# Patient Record
Sex: FEMALE | Race: BLACK OR AFRICAN AMERICAN | Employment: FULL TIME | URBAN - METROPOLITAN AREA
[De-identification: names, ages, dates, MRNs, and addresses within clinical notes are randomized per-mention and may not be internally consistent; named-entity substitution may affect disease eponyms.]

---

## 2017-03-15 DIAGNOSIS — I10 ESSENTIAL HYPERTENSION WITH GOAL BLOOD PRESSURE LESS THAN 130/80: ICD-10-CM

## 2017-03-15 RX ORDER — HYDROCHLOROTHIAZIDE 25 MG/1
TABLET ORAL
Qty: 30 TAB | Refills: 1 | Status: SHIPPED | OUTPATIENT
Start: 2017-03-15 | End: 2017-06-01 | Stop reason: SDUPTHER

## 2017-06-01 ENCOUNTER — OFFICE VISIT (OUTPATIENT)
Dept: FAMILY MEDICINE CLINIC | Age: 52
End: 2017-06-01

## 2017-06-01 VITALS
OXYGEN SATURATION: 95 % | WEIGHT: 194 LBS | RESPIRATION RATE: 20 BRPM | HEART RATE: 101 BPM | BODY MASS INDEX: 34.38 KG/M2 | DIASTOLIC BLOOD PRESSURE: 82 MMHG | TEMPERATURE: 98.2 F | HEIGHT: 63 IN | SYSTOLIC BLOOD PRESSURE: 117 MMHG

## 2017-06-01 DIAGNOSIS — N90.7 EPIDERMOID CYST OF VULVA: Primary | ICD-10-CM

## 2017-06-01 DIAGNOSIS — I10 ESSENTIAL HYPERTENSION WITH GOAL BLOOD PRESSURE LESS THAN 130/80: ICD-10-CM

## 2017-06-01 RX ORDER — HYDROCHLOROTHIAZIDE 25 MG/1
25 TABLET ORAL DAILY
Qty: 30 TAB | Refills: 5 | Status: SHIPPED | OUTPATIENT
Start: 2017-06-01 | End: 2018-07-05 | Stop reason: SDUPTHER

## 2017-06-01 RX ORDER — CLINDAMYCIN HYDROCHLORIDE 300 MG/1
300 CAPSULE ORAL 3 TIMES DAILY
Qty: 30 CAP | Refills: 0 | Status: SHIPPED | OUTPATIENT
Start: 2017-06-01 | End: 2017-06-11

## 2017-06-01 RX ORDER — CEPHALEXIN 500 MG/1
500 CAPSULE ORAL 4 TIMES DAILY
Qty: 40 CAP | Refills: 0 | Status: SHIPPED | OUTPATIENT
Start: 2017-06-01 | End: 2017-06-11

## 2017-06-01 NOTE — MR AVS SNAPSHOT
Visit Information Date & Time Provider Department Dept. Phone Encounter #  
 6/1/2017 11:45 AM Curry Pickett NP [facility] [id number] Follow-up Instructions Return if symptoms worsen or fail to improve. Upcoming Health Maintenance Date Due FOBT Q 1 YEAR AGE 50-75 11/26/2016 INFLUENZA AGE 9 TO ADULT 8/1/2017 BREAST CANCER SCRN MAMMOGRAM 11/18/2018 DTaP/Tdap/Td series (2 - Td) 11/17/2026 Allergies as of 6/1/2017  Review Complete On: 6/1/2017 By: Curry Pickett NP Severity Noted Reaction Type Reactions Pcn [Penicillins]  06/19/2015    Rash Pcn [Penicillins]  05/26/2016    Rash, Swelling Percocet [Oxycodone-acetaminophen]  06/09/2016    Other (comments)  
 hypotension Sulfa (Sulfonamide Antibiotics)  06/19/2015    Hives Sulfa (Sulfonamide Antibiotics)  05/26/2016    Rash, Swelling Current Immunizations  Reviewed on 11/17/2016 Name Date Influenza Vaccine (Quad) PF 11/17/2016 Tdap 11/17/2016 Not reviewed this visit You Were Diagnosed With   
  
 Codes Comments Labial cyst    -  Primary ICD-10-CM: N90.7 ICD-9-CM: 624.8 Essential hypertension with goal blood pressure less than 130/80     ICD-10-CM: I10 
ICD-9-CM: 401.9 Vitals BP Pulse Temp Resp Height(growth percentile) Weight(growth percentile) 117/82 (BP 1 Location: Right arm, BP Patient Position: Sitting) (!) 101 98.2 °F (36.8 °C) (Oral) 20 5' 2.5\" (1.588 m) 194 lb (88 kg) SpO2 BMI OB Status Smoking Status 95% 34.92 kg/m2 Hysterectomy Never Smoker BMI and BSA Data Body Mass Index Body Surface Area 34.92 kg/m 2 1.97 m 2 Preferred Pharmacy Pharmacy Name Phone [id number], [address] [place] [address] [phone] Your Updated Medication List  
  
   
This list is accurate as of: 6/1/17 12:13 PM.  Always use your most recent med list.  
  
  
  
 albuterol 90 mcg/actuation inhaler Commonly known as:  PROVENTIL HFA, VENTOLIN HFA, PROAIR HFA Take 2 Puffs by inhalation every four (4) hours as needed for Wheezing. cephALEXin 500 mg capsule Commonly known as:  Larina Grise Take 1 Cap by mouth four (4) times daily for 10 days. cetirizine 10 mg tablet Commonly known as:  ZYRTEC Take 1 Tab by mouth daily. fluticasone 50 mcg/actuation nasal spray Commonly known as:  Thierno Mcpherson 2 Sprays by Both Nostrils route daily. hydroCHLOROthiazide 25 mg tablet Commonly known as:  HYDRODIURIL Take 1 Tab by mouth daily. inhalational spacing device For use with inhaler Prescriptions Sent to Pharmacy Refills  
 cephALEXin (KEFLEX) 500 mg capsule 0 Sig: Take 1 Cap by mouth four (4) times daily for 10 days. Class: Normal  
 Pharmacy: [address] [place] [address] Ph #: [phone] Route: Oral  
 hydroCHLOROthiazide (HYDRODIURIL) 25 mg tablet 5 Sig: Take 1 Tab by mouth daily. Class: Normal  
 Pharmacy: [address] [place] [address] Ph #: [phone] Route: Oral  
  
We Performed the Following REFERRAL TO GYNECOLOGY [REF30 Custom] Comments:  
 Please evaluate patient for labial cyst.  
  
Follow-up Instructions Return if symptoms worsen or fail to improve. Referral Information Referral ID Referred By Referred To  
  
 [id number] Jenelle Padilla MD   
   [facility] [address] [facility], [address] [facility] Phone: [phone] Fax: [phone] Visits Status Start Date End Date 1 New Request 6/1/17 6/1/18 If your referral has a status of pending review or denied, additional information will be sent to support the outcome of this decision. Patient Instructions DASH Diet: Care Instructions Your Care Instructions The DASH diet is an eating plan that can help lower your blood pressure. DASH stands for Dietary Approaches to Stop Hypertension. Hypertension is high blood pressure. The DASH diet focuses on eating foods that are high in calcium, potassium, and magnesium. These nutrients can lower blood pressure. The foods that are highest in these nutrients are fruits, vegetables, low-fat dairy products, nuts, seeds, and legumes. But taking calcium, potassium, and magnesium supplements instead of eating foods that are high in those nutrients does not have the same effect. The DASH diet also includes whole grains, fish, and poultry. The DASH diet is one of several lifestyle changes your doctor may recommend to lower your high blood pressure. Your doctor may also want you to decrease the amount of sodium in your diet. Lowering sodium while following the DASH diet can lower blood pressure even further than just the DASH diet alone. Follow-up care is a key part of your treatment and safety. Be sure to make and go to all appointments, and call your doctor if you are having problems. It's also a good idea to know your test results and keep a list of the medicines you take. How can you care for yourself at home? Following the DASH diet · Eat 4 to 5 servings of fruit each day. A serving is 1 medium-sized piece of fruit, ½ cup chopped or canned fruit, 1/4 cup dried fruit, or 4 ounces (½ cup) of fruit juice. Choose fruit more often than fruit juice. · Eat 4 to 5 servings of vegetables each day. A serving is 1 cup of lettuce or raw leafy vegetables, ½ cup of chopped or cooked vegetables, or 4 ounces (½ cup) of vegetable juice. Choose vegetables more often than vegetable juice. · Get 2 to 3 servings of low-fat and fat-free dairy each day. A serving is 8 ounces of milk, 1 cup of yogurt, or 1 ½ ounces of cheese. · Eat 6 to 8 servings of grains each day.  A serving is 1 slice of bread, 1 ounce of dry cereal, or ½ cup of cooked rice, pasta, or cooked cereal. Try to choose whole-grain products as much as possible. · Limit lean meat, poultry, and fish to 2 servings each day. A serving is 3 ounces, about the size of a deck of cards. · Eat 4 to 5 servings of nuts, seeds, and legumes (cooked dried beans, lentils, and split peas) each week. A serving is 1/3 cup of nuts, 2 tablespoons of seeds, or ½ cup of cooked beans or peas. · Limit fats and oils to 2 to 3 servings each day. A serving is 1 teaspoon of vegetable oil or 2 tablespoons of salad dressing. · Limit sweets and added sugars to 5 servings or less a week. A serving is 1 tablespoon jelly or jam, ½ cup sorbet, or 1 cup of lemonade. · Eat less than 2,300 milligrams (mg) of sodium a day. If you limit your sodium to 1,500 mg a day, you can lower your blood pressure even more. Tips for success · Start small. Do not try to make dramatic changes to your diet all at once. You might feel that you are missing out on your favorite foods and then be more likely to not follow the plan. Make small changes, and stick with them. Once those changes become habit, add a few more changes. · Try some of the following: ¨ Make it a goal to eat a fruit or vegetable at every meal and at snacks. This will make it easy to get the recommended amount of fruits and vegetables each day. ¨ Try yogurt topped with fruit and nuts for a snack or healthy dessert. ¨ Add lettuce, tomato, cucumber, and onion to sandwiches. ¨ Combine a ready-made pizza crust with low-fat mozzarella cheese and lots of vegetable toppings. Try using tomatoes, squash, spinach, broccoli, carrots, cauliflower, and onions. ¨ Have a variety of cut-up vegetables with a low-fat dip as an appetizer instead of chips and dip. ¨ Sprinkle sunflower seeds or chopped almonds over salads. Or try adding chopped walnuts or almonds to cooked vegetables. ¨ Try some vegetarian meals using beans and peas. Add garbanzo or kidney beans to salads. Make burritos and tacos with mashed kingsley beans or black beans. Where can you learn more? Go to http://shruthi-ashley.info/. Enter G263 in the search box to learn more about \"DASH Diet: Care Instructions. \" Current as of: March 23, 2016 Content Version: 11.2 © 9082-4589 [employer]. Care instructions adapted under license by [employer] (which disclaims liability or warranty for this information). If you have questions about a medical condition or this instruction, always ask your healthcare professional. [place] [address] any warranty or liability for your use of this information. Epidermoid Cyst: Care Instructions Your Care Instructions An epidermoid (say \"pz-zfs-TWV-eduin\") cyst is a lump just under the skin. These cysts can form when a hair follicle becomes blocked. They are common in acne and may occur on the face, neck, back, and genitals. However, they can form anywhere on the body. These cysts are not cancer and do not lead to cancer. They tend not to hurt, but they can sometimes become swollen and painful. They also may break open (rupture) and cause scarring. These cysts sometimes do not cause problems and may not need treatment. If you have a cyst that is swollen and hurts, your doctor may inject it with a medicine to help it heal. But it is more likely that a painful cyst will need to be removed. Your doctor will give you a shot of numbing medicine and cut into the cyst to drain it or remove it. This makes the symptoms go away. Follow-up care is a key part of your treatment and safety. Be sure to make and go to all appointments, and call your doctor if you are having problems. Its also a good idea to know your test results and keep a list of the medicines you take. How can you care for yourself at home? · Do not squeeze the cyst or poke it with a needle to open it. This can cause swelling, redness, and infection. · Always have a doctor look at any new lumps you get to make sure that they are not serious. When should you call for help? Watch closely for changes in your health, and be sure to contact your doctor if: 
· You have a fever, redness, or swelling after you get a shot of medicine in the cyst. 
· You see or feel a new lump on your skin. Where can you learn more? Go to http://shruthi-ashley.info/. Enter S861 in the search box to learn more about \"Epidermoid Cyst: Care Instructions. \" Current as of: October 13, 2016 Content Version: 11.2 © 6349-8291 [employer]. Care instructions adapted under license by [employer] (which disclaims liability or warranty for this information). If you have questions about a medical condition or this instruction, always ask your healthcare professional. [place] [address] any warranty or liability for your use of this information. Introducing [facility]! Dear Henrietta Tellez: 
Thank you for requesting a [employer] account. Our records indicate that you already have an active [employer] account. You can access your account anytime at [email or web address]. [employer] Did you know that you can access your hospital and ER discharge instructions at any time in [employer]? You can also review all of your test results from your hospital stay or ER visit. Additional Information If you have questions, please visit the Frequently Asked Questions section of the [employer] website at [email or web address]. [employer]/. Remember, [employer] is NOT to be used for urgent needs. For medical emergencies, dial 911. Now available from your iPhone and Android! Please provide this summary of care documentation to your next provider. Your primary care clinician is listed as Giulia Friend.  If you have any questions after today's visit, please call [phone].

## 2017-06-01 NOTE — PROGRESS NOTES
Chief Complaint   Patient presents with    Vaginal Itching     x 3 days     Skin Problem     found a bump on labia      1. Have you been to the ER, urgent care clinic since your last visit? Hospitalized since your last visit? No    2. Have you seen or consulted any other health care providers outside of the [facility] since your last visit? Include any pap smears or colon screening.  No

## 2017-06-01 NOTE — PROGRESS NOTES
James Dowd is a 49 y.o. female who presents with the following complaints:  Chief Complaint   Patient presents with    Vaginal Itching     x 3 days     Skin Problem     found a bump on labia        Subjective:    HPI:   C/o 3 day hx of bump on labia with associated itching and tenderness. No vaginal discharge or tenderness. NO fever or chills. No drainage from bump. It has gotten a bit larger. Has tried no medication or other treatment for her symptoms. Had a cyst excised from the labia a few years ago after similar symptoms. , monogamous. No previous hx of herpes or other STD.     Pertinent PMH/FH/SH:  Past Medical History:   Diagnosis Date    Fibroid tumor      Past Surgical History:   Procedure Laterality Date    HX GYN      removal of fibroid tumors    HX HYSTERECTOMY      HX MENISCUS REPAIR      9/2015    HX PARTIAL HYSTERECTOMY      HX TUBAL LIGATION      HX TUBAL LIGATION      1990     Family History   Problem Relation Age of Onset    Cancer Mother     Dementia Mother     No Known Problems Father      Social History     Social History    Marital status:      Spouse name: N/A    Number of children: N/A    Years of education: N/A     Social History Main Topics    Smoking status: Never Smoker    Smokeless tobacco: None    Alcohol use No    Drug use: No    Sexual activity: Yes     Other Topics Concern    None     Social History Narrative    ** Merged History Encounter **          Advanced Directives: N      Patient Active Problem List    Diagnosis    Prediabetes    Obesity, Class I, BMI 30-34.9    Essential hypertension with goal blood pressure less than 130/80       Nurse notes were reviewed and are correct  Review of Systems - negative except as listed above in the HPI    Objective:     Vitals:    06/01/17 1141   BP: 117/82   Pulse: (!) 101   Resp: 20   Temp: 98.2 °F (36.8 °C)   TempSrc: Oral   SpO2: 95%   Weight: 194 lb (88 kg)   Height: 5' 2.5\" (1.588 m)     Physical Examination: General appearance - alert, well appearing, and in no distress, oriented to person, place, and time, overweight and well hydrated  Mental status - normal mood, behavior, speech, dress, motor activity, and thought processes  Chest - clear to auscultation, no wheezes, rales or rhonchi, symmetric air entry  Heart - normal rate, regular rhythm, normal S1, S2, no murmurs, rubs, clicks or gallops  Neurological - alert, oriented, normal speech, no focal findings or movement disorder noted  Skin - normal coloration and turgor    Physical Exam   Genitourinary:       No tenderness in the vagina. No vaginal discharge found. Assessment/ Plan:   Lum Rule was seen today for vaginal itching and skin problem. Diagnoses and all orders for this visit:    Epideroid cyst of vulva  Add Rx  Refer to gyn  Reports allergy to IV antibx given during knee surgery. These records were not available for review today, suspect it was a cephalosporin, will treat with clindamycin  -     REFERRAL TO GYNECOLOGY  -     clindamycin (CLEOCIN) 300 mg capsule; Take 1 Cap by mouth three (3) times daily for 10 days. Essential hypertension with goal blood pressure less than 130/80  At goal  Continue Rx  DASH diet  Weight loss  exercise  -     hydroCHLOROthiazide (HYDRODIURIL) 25 mg tablet; Take 1 Tab by mouth daily. Follow-up Disposition:  Return if symptoms worsen or fail to improve. I have discussed the diagnosis with the patient and the intended plan as seen in the above orders. The patient has received an after-visit summary and questions were answered concerning future plans. The patient verbalizes understanding.     Medication Side Effects and Warnings were discussed with patient: yes  Patient Labs were reviewed and or requested: yes  Patient Past Records were reviewed and or requested: yes    Patient Instructions        DASH Diet: Care Instructions  Your Care Instructions  The DASH diet is an eating plan that can help lower your blood pressure. DASH stands for Dietary Approaches to Stop Hypertension. Hypertension is high blood pressure. The DASH diet focuses on eating foods that are high in calcium, potassium, and magnesium. These nutrients can lower blood pressure. The foods that are highest in these nutrients are fruits, vegetables, low-fat dairy products, nuts, seeds, and legumes. But taking calcium, potassium, and magnesium supplements instead of eating foods that are high in those nutrients does not have the same effect. The DASH diet also includes whole grains, fish, and poultry. The DASH diet is one of several lifestyle changes your doctor may recommend to lower your high blood pressure. Your doctor may also want you to decrease the amount of sodium in your diet. Lowering sodium while following the DASH diet can lower blood pressure even further than just the DASH diet alone. Follow-up care is a key part of your treatment and safety. Be sure to make and go to all appointments, and call your doctor if you are having problems. It's also a good idea to know your test results and keep a list of the medicines you take. How can you care for yourself at home? Following the DASH diet  · Eat 4 to 5 servings of fruit each day. A serving is 1 medium-sized piece of fruit, ½ cup chopped or canned fruit, 1/4 cup dried fruit, or 4 ounces (½ cup) of fruit juice. Choose fruit more often than fruit juice. · Eat 4 to 5 servings of vegetables each day. A serving is 1 cup of lettuce or raw leafy vegetables, ½ cup of chopped or cooked vegetables, or 4 ounces (½ cup) of vegetable juice. Choose vegetables more often than vegetable juice. · Get 2 to 3 servings of low-fat and fat-free dairy each day. A serving is 8 ounces of milk, 1 cup of yogurt, or 1 ½ ounces of cheese. · Eat 6 to 8 servings of grains each day.  A serving is 1 slice of bread, 1 ounce of dry cereal, or ½ cup of cooked rice, pasta, or cooked cereal. Try to choose whole-grain products as much as possible. · Limit lean meat, poultry, and fish to 2 servings each day. A serving is 3 ounces, about the size of a deck of cards. · Eat 4 to 5 servings of nuts, seeds, and legumes (cooked dried beans, lentils, and split peas) each week. A serving is 1/3 cup of nuts, 2 tablespoons of seeds, or ½ cup of cooked beans or peas. · Limit fats and oils to 2 to 3 servings each day. A serving is 1 teaspoon of vegetable oil or 2 tablespoons of salad dressing. · Limit sweets and added sugars to 5 servings or less a week. A serving is 1 tablespoon jelly or jam, ½ cup sorbet, or 1 cup of lemonade. · Eat less than 2,300 milligrams (mg) of sodium a day. If you limit your sodium to 1,500 mg a day, you can lower your blood pressure even more. Tips for success  · Start small. Do not try to make dramatic changes to your diet all at once. You might feel that you are missing out on your favorite foods and then be more likely to not follow the plan. Make small changes, and stick with them. Once those changes become habit, add a few more changes. · Try some of the following:  ¨ Make it a goal to eat a fruit or vegetable at every meal and at snacks. This will make it easy to get the recommended amount of fruits and vegetables each day. ¨ Try yogurt topped with fruit and nuts for a snack or healthy dessert. ¨ Add lettuce, tomato, cucumber, and onion to sandwiches. ¨ Combine a ready-made pizza crust with low-fat mozzarella cheese and lots of vegetable toppings. Try using tomatoes, squash, spinach, broccoli, carrots, cauliflower, and onions. ¨ Have a variety of cut-up vegetables with a low-fat dip as an appetizer instead of chips and dip. ¨ Sprinkle sunflower seeds or chopped almonds over salads. Or try adding chopped walnuts or almonds to cooked vegetables. ¨ Try some vegetarian meals using beans and peas. Add garbanzo or kidney beans to salads.  Make burritos and tacos with mashed kingsley beans or black beans.  Where can you learn more? Go to http://shruthi-ashley.info/. Enter D153 in the search box to learn more about \"DASH Diet: Care Instructions. \"  Current as of: March 23, 2016  Content Version: 11.2  © 7788-1073 [employer]. Care instructions adapted under license by [employer] (which disclaims liability or warranty for this information). If you have questions about a medical condition or this instruction, always ask your healthcare professional. [address] any warranty or liability for your use of this information. Epidermoid Cyst: Care Instructions  Your Care Instructions  An epidermoid (say \"kd-aej-PZD-eduin\") cyst is a lump just under the skin. These cysts can form when a hair follicle becomes blocked. They are common in acne and may occur on the face, neck, back, and genitals. However, they can form anywhere on the body. These cysts are not cancer and do not lead to cancer. They tend not to hurt, but they can sometimes become swollen and painful. They also may break open (rupture) and cause scarring. These cysts sometimes do not cause problems and may not need treatment. If you have a cyst that is swollen and hurts, your doctor may inject it with a medicine to help it heal. But it is more likely that a painful cyst will need to be removed. Your doctor will give you a shot of numbing medicine and cut into the cyst to drain it or remove it. This makes the symptoms go away. Follow-up care is a key part of your treatment and safety. Be sure to make and go to all appointments, and call your doctor if you are having problems. Its also a good idea to know your test results and keep a list of the medicines you take. How can you care for yourself at home? · Do not squeeze the cyst or poke it with a needle to open it. This can cause swelling, redness, and infection.   · Always have a doctor look at any new lumps you get to make sure that they are not serious. When should you call for help? Watch closely for changes in your health, and be sure to contact your doctor if:  · You have a fever, redness, or swelling after you get a shot of medicine in the cyst.  · You see or feel a new lump on your skin. Where can you learn more? Go to http://shruthi-ashley.info/. Enter D915 in the search box to learn more about \"Epidermoid Cyst: Care Instructions. \"  Current as of: October 13, 2016  Content Version: 11.2  © 6767-4186 [employer]. Care instructions adapted under license by [employer] (which disclaims liability or warranty for this information). If you have questions about a medical condition or this instruction, always ask your healthcare professional. [address] any warranty or liability for your use of this information.           Milind LIVE

## 2017-06-01 NOTE — PATIENT INSTRUCTIONS
DASH Diet: Care Instructions  Your Care Instructions  The DASH diet is an eating plan that can help lower your blood pressure. DASH stands for Dietary Approaches to Stop Hypertension. Hypertension is high blood pressure. The DASH diet focuses on eating foods that are high in calcium, potassium, and magnesium. These nutrients can lower blood pressure. The foods that are highest in these nutrients are fruits, vegetables, low-fat dairy products, nuts, seeds, and legumes. But taking calcium, potassium, and magnesium supplements instead of eating foods that are high in those nutrients does not have the same effect. The DASH diet also includes whole grains, fish, and poultry. The DASH diet is one of several lifestyle changes your doctor may recommend to lower your high blood pressure. Your doctor may also want you to decrease the amount of sodium in your diet. Lowering sodium while following the DASH diet can lower blood pressure even further than just the DASH diet alone. Follow-up care is a key part of your treatment and safety. Be sure to make and go to all appointments, and call your doctor if you are having problems. It's also a good idea to know your test results and keep a list of the medicines you take. How can you care for yourself at home? Following the DASH diet  · Eat 4 to 5 servings of fruit each day. A serving is 1 medium-sized piece of fruit, ½ cup chopped or canned fruit, 1/4 cup dried fruit, or 4 ounces (½ cup) of fruit juice. Choose fruit more often than fruit juice. · Eat 4 to 5 servings of vegetables each day. A serving is 1 cup of lettuce or raw leafy vegetables, ½ cup of chopped or cooked vegetables, or 4 ounces (½ cup) of vegetable juice. Choose vegetables more often than vegetable juice. · Get 2 to 3 servings of low-fat and fat-free dairy each day. A serving is 8 ounces of milk, 1 cup of yogurt, or 1 ½ ounces of cheese. · Eat 6 to 8 servings of grains each day.  A serving is 1 slice of bread, 1 ounce of dry cereal, or ½ cup of cooked rice, pasta, or cooked cereal. Try to choose whole-grain products as much as possible. · Limit lean meat, poultry, and fish to 2 servings each day. A serving is 3 ounces, about the size of a deck of cards. · Eat 4 to 5 servings of nuts, seeds, and legumes (cooked dried beans, lentils, and split peas) each week. A serving is 1/3 cup of nuts, 2 tablespoons of seeds, or ½ cup of cooked beans or peas. · Limit fats and oils to 2 to 3 servings each day. A serving is 1 teaspoon of vegetable oil or 2 tablespoons of salad dressing. · Limit sweets and added sugars to 5 servings or less a week. A serving is 1 tablespoon jelly or jam, ½ cup sorbet, or 1 cup of lemonade. · Eat less than 2,300 milligrams (mg) of sodium a day. If you limit your sodium to 1,500 mg a day, you can lower your blood pressure even more. Tips for success  · Start small. Do not try to make dramatic changes to your diet all at once. You might feel that you are missing out on your favorite foods and then be more likely to not follow the plan. Make small changes, and stick with them. Once those changes become habit, add a few more changes. · Try some of the following:  ¨ Make it a goal to eat a fruit or vegetable at every meal and at snacks. This will make it easy to get the recommended amount of fruits and vegetables each day. ¨ Try yogurt topped with fruit and nuts for a snack or healthy dessert. ¨ Add lettuce, tomato, cucumber, and onion to sandwiches. ¨ Combine a ready-made pizza crust with low-fat mozzarella cheese and lots of vegetable toppings. Try using tomatoes, squash, spinach, broccoli, carrots, cauliflower, and onions. ¨ Have a variety of cut-up vegetables with a low-fat dip as an appetizer instead of chips and dip. ¨ Sprinkle sunflower seeds or chopped almonds over salads. Or try adding chopped walnuts or almonds to cooked vegetables. ¨ Try some vegetarian meals using beans and peas. Add garbanzo or kidney beans to salads. Make burritos and tacos with mashed kingsley beans or black beans. Where can you learn more? Go to http://shruthi-ashley.info/. Enter G021 in the search box to learn more about \"DASH Diet: Care Instructions. \"  Current as of: March 23, 2016  Content Version: 11.2  © 5507-9743 [employer]. Care instructions adapted under license by [employer] (which disclaims liability or warranty for this information). If you have questions about a medical condition or this instruction, always ask your healthcare professional. [place] [address] any warranty or liability for your use of this information. Epidermoid Cyst: Care Instructions  Your Care Instructions  An epidermoid (say \"kc-rrv-VCI-moyd\") cyst is a lump just under the skin. These cysts can form when a hair follicle becomes blocked. They are common in acne and may occur on the face, neck, back, and genitals. However, they can form anywhere on the body. These cysts are not cancer and do not lead to cancer. They tend not to hurt, but they can sometimes become swollen and painful. They also may break open (rupture) and cause scarring. These cysts sometimes do not cause problems and may not need treatment. If you have a cyst that is swollen and hurts, your doctor may inject it with a medicine to help it heal. But it is more likely that a painful cyst will need to be removed. Your doctor will give you a shot of numbing medicine and cut into the cyst to drain it or remove it. This makes the symptoms go away. Follow-up care is a key part of your treatment and safety. Be sure to make and go to all appointments, and call your doctor if you are having problems. Its also a good idea to know your test results and keep a list of the medicines you take. How can you care for yourself at home? · Do not squeeze the cyst or poke it with a needle to open it.  This can cause swelling, redness, and infection. · Always have a doctor look at any new lumps you get to make sure that they are not serious. When should you call for help? Watch closely for changes in your health, and be sure to contact your doctor if:  · You have a fever, redness, or swelling after you get a shot of medicine in the cyst.  · You see or feel a new lump on your skin. Where can you learn more? Go to http://shruthi-ashley.info/. Enter N675 in the search box to learn more about \"Epidermoid Cyst: Care Instructions. \"  Current as of: October 13, 2016  Content Version: 11.2  © 7763-5447 [employer]. Care instructions adapted under license by [employer] (which disclaims liability or warranty for this information). If you have questions about a medical condition or this instruction, always ask your healthcare professional. [place] [address] any warranty or liability for your use of this information.

## 2017-06-02 ENCOUNTER — TELEPHONE (OUTPATIENT)
Dept: FAMILY MEDICINE CLINIC | Age: 52
End: 2017-06-02

## 2017-06-02 RX ORDER — DOXYCYCLINE 100 MG/1
100 CAPSULE ORAL 2 TIMES DAILY
Qty: 20 CAP | Refills: 0 | Status: SHIPPED | OUTPATIENT
Start: 2017-06-02 | End: 2017-06-12

## 2017-06-02 NOTE — TELEPHONE ENCOUNTER
Patient stated she's allergic to the clindamycin that was sent to the pharmacy. She would like to know if something else could be ordered for her instead.

## 2017-06-02 NOTE — TELEPHONE ENCOUNTER
She said she went over with you during her visit yesterday. She said that you were going to look in her chart to make sure and send something else just in case.

## 2017-06-02 NOTE — TELEPHONE ENCOUNTER
Was she already allergic or is she having a reaction now?   Will send rx for doxycycline to pharmacy

## 2017-06-02 NOTE — TELEPHONE ENCOUNTER
I reviewed her chart, did not see any allergies mentioned other than the pcn and bactrim. May need to request records from the surgery center.

## 2017-12-13 ENCOUNTER — OFFICE VISIT (OUTPATIENT)
Dept: FAMILY MEDICINE CLINIC | Age: 52
End: 2017-12-13

## 2017-12-13 VITALS
HEIGHT: 63 IN | SYSTOLIC BLOOD PRESSURE: 128 MMHG | RESPIRATION RATE: 20 BRPM | WEIGHT: 193 LBS | OXYGEN SATURATION: 96 % | DIASTOLIC BLOOD PRESSURE: 84 MMHG | BODY MASS INDEX: 34.2 KG/M2 | HEART RATE: 94 BPM | TEMPERATURE: 98.4 F

## 2017-12-13 DIAGNOSIS — Z12.11 SCREENING FOR COLON CANCER: ICD-10-CM

## 2017-12-13 DIAGNOSIS — R05.9 COUGH: ICD-10-CM

## 2017-12-13 DIAGNOSIS — J30.89 ACUTE NON-SEASONAL ALLERGIC RHINITIS, UNSPECIFIED TRIGGER: Primary | ICD-10-CM

## 2017-12-13 RX ORDER — CETIRIZINE HCL 10 MG
10 TABLET ORAL DAILY
Qty: 30 TAB | Refills: 5 | Status: SHIPPED | OUTPATIENT
Start: 2017-12-13 | End: 2019-05-29 | Stop reason: ALTCHOICE

## 2017-12-13 RX ORDER — BENZONATATE 200 MG/1
200 CAPSULE ORAL
Qty: 30 CAP | Refills: 0 | Status: SHIPPED | OUTPATIENT
Start: 2017-12-13 | End: 2017-12-20

## 2017-12-13 RX ORDER — FLUTICASONE PROPIONATE 50 MCG
2 SPRAY, SUSPENSION (ML) NASAL DAILY
Qty: 1 BOTTLE | Refills: 5 | Status: SHIPPED | OUTPATIENT
Start: 2017-12-13 | End: 2019-05-29 | Stop reason: ALTCHOICE

## 2017-12-13 NOTE — PROGRESS NOTES
Chief Complaint   Patient presents with    Headache     C/o reoccuring symptoms only when in bedroom     Cough     1. Have you been to the ER, urgent care clinic since your last visit? Hospitalized since your last visit? no    2. Have you seen or consulted any other health care providers outside of the [address] [place] [address] since your last visit? Include any pap smears or colon screening.  No

## 2017-12-13 NOTE — PROGRESS NOTES
Subjective:   Shaina Fagan is a 47 y.o. female who complains of dry cough and headache for intermittently for the past months. She notes the symptoms only occur when she is in her bedroom. She has noted a musty, \"wet dirt\" smell in the room. She sleeps right under the air vent. She also uses her bedroom as her office and works from home, is in the room most of the day. Her  has not noted any similar symptoms, but he is not in the room during the day, and wears CPAP mask at night. No fever or chills. No purulent nasal discharge. + nausea at times. She denies a history of chills, fevers, shortness of breath, vomiting and wheezing. Evaluation to date: none. Treatment to date: OTC products, which have been somewhat effective. Patient does not smoke cigarettes. Relevant PMH: No pertinent additional PMH.     Patient Active Problem List   Diagnosis Code    Essential hypertension with goal blood pressure less than 130/80 I10    Prediabetes R73.03    Obesity, Class I, BMI 30-34.9 E66.9     Allergies   Allergen Reactions    Pcn [Penicillins] Rash    Pcn [Penicillins] Rash and Swelling    Percocet [Oxycodone-Acetaminophen] Other (comments)     hypotension    Sulfa (Sulfonamide Antibiotics) Hives    Sulfa (Sulfonamide Antibiotics) Rash and Swelling     Past Medical History:   Diagnosis Date    Fibroid tumor      Past Surgical History:   Procedure Laterality Date    HX GYN      removal of fibroid tumors    HX HYSTERECTOMY      HX MENISCUS REPAIR      9/2015    HX PARTIAL HYSTERECTOMY      HX TUBAL LIGATION      HX TUBAL LIGATION      1990     Family History   Problem Relation Age of Onset   Comfort Spell Cancer Mother     Dementia Mother     No Known Problems Father      Social History   Substance Use Topics    Smoking status: Never Smoker    Smokeless tobacco: Not on file    Alcohol use No        Review of Systems  A comprehensive review of systems was negative except for that written in the HPI.    Objective:     Visit Vitals    /84 (BP 1 Location: Right arm, BP Patient Position: Sitting)    Pulse 94    Temp 98.4 °F (36.9 °C) (Oral)    Resp 20    Ht 5' 2.5\" (1.588 m)    Wt 193 lb (87.5 kg)    SpO2 96%    BMI 34.74 kg/m2     General:  alert, cooperative, no distress   Eyes: negative   Ears: normal TM's and external ear canals AU   Sinuses: Normal paranasal sinuses without tenderness. Nasal mucosa erythematous, boggy, clear rhinorrhea. Mouth:  abnormal findings: mild oropharyngeal erythema. + post nasal drip, clear. Neck: supple, symmetrical, trachea midline and no adenopathy. Heart: S1 and S2 normal, no murmurs noted. Lungs: clear to auscultation bilaterally   Abdomen: soft, non-tender. Bowel sounds normal. No masses,  no organomegaly        Assessment/Plan:   allergic rhinitis  Suggested symptomatic OTC remedies. Nasal saline sprays for congestion. RTC prn. Diagnoses and all orders for this visit:    1. Acute non-seasonal allergic rhinitis, unspecified trigger  Add rx  Limit time in bedroom  Recommend she have airducts inspected for presence of mold or other allergans as planned  -     fluticasone (FLONASE) 50 mcg/actuation nasal spray; 2 Sprays by Both Nostrils route daily. -     cetirizine (ZYRTEC) 10 mg tablet; Take 1 Tab by mouth daily. 2. Cough  Add Rx  -     benzonatate (TESSALON) 200 mg capsule; Take 1 Cap by mouth three (3) times daily as needed for Cough for up to 7 days. 3. Screening for colon cancer  Discussed need for routine screening- she did not schedule the colonoscopy after last year's check up. Referral given, patient verbalizes intent to make appt after the holidays.  -     REFERRAL TO GASTROENTEROLOGY      Follow-up Disposition:  Return in about 4 weeks (around 1/10/2018), or if symptoms worsen or fail to improve, for annual physical.     I have discussed the diagnosis with the patient and the intended plan as seen in the above orders.  The patient has received an after-visit summary and questions were answered concerning future plans. Patient conveyed understanding of the plan at the time of the visit.     Abran Jorgensen, PRACHI  12/13/17

## 2017-12-13 NOTE — MR AVS SNAPSHOT
Visit Information Date & Time Provider Department Dept. Phone Encounter #  
 12/13/2017 11:00 AM Peyton Perez  [facility] [phone] [id number] Follow-up Instructions Return in about 4 weeks (around 1/10/2018), or if symptoms worsen or fail to improve, for annual physical.  
  
Upcoming Health Maintenance Date Due FOBT Q 1 YEAR AGE 50-75 11/26/2016 Influenza Age 5 to Adult 8/1/2017 DTaP/Tdap/Td series (2 - Td) 11/17/2026 Allergies as of 12/13/2017  Review Complete On: 12/13/2017 By: Sully Cantu Severity Noted Reaction Type Reactions Pcn [Penicillins]  06/19/2015    Rash Pcn [Penicillins]  05/26/2016    Rash, Swelling Percocet [Oxycodone-acetaminophen]  06/09/2016    Other (comments)  
 hypotension Sulfa (Sulfonamide Antibiotics)  06/19/2015    Hives Sulfa (Sulfonamide Antibiotics)  05/26/2016    Rash, Swelling Current Immunizations  Reviewed on 11/17/2016 Name Date Influenza Vaccine (Quad) PF 11/17/2016 Tdap 11/17/2016 Not reviewed this visit You Were Diagnosed With   
  
 Codes Comments Acute non-seasonal allergic rhinitis, unspecified trigger    -  Primary ICD-10-CM: J30.89 ICD-9-CM: 477.9 Cough     ICD-10-CM: R05 ICD-9-CM: 799. 2 Vitals BP Pulse Temp Resp Height(growth percentile) Weight(growth percentile) 128/84 (BP 1 Location: Right arm, BP Patient Position: Sitting) 94 98.4 °F (36.9 °C) (Oral) 20 5' 2.5\" (1.588 m) 193 lb (87.5 kg) SpO2 BMI OB Status Smoking Status 96% 34.74 kg/m2 Hysterectomy Never Smoker Vitals History BMI and BSA Data Body Mass Index Body Surface Area 34.74 kg/m 2 1.96 m 2 Preferred Pharmacy Pharmacy Name Phone [id number] [address] [phone] Your Updated Medication List  
  
   
This list is accurate as of: 12/13/17 11:33 AM.  Always use your most recent med list.  
  
  
  
  
 albuterol 90 mcg/actuation inhaler Commonly known as:  PROVENTIL HFA, VENTOLIN HFA, PROAIR HFA Take 2 Puffs by inhalation every four (4) hours as needed for Wheezing. benzonatate 200 mg capsule Commonly known as:  TESSALON Take 1 Cap by mouth three (3) times daily as needed for Cough for up to 7 days. cetirizine 10 mg tablet Commonly known as:  ZYRTEC Take 1 Tab by mouth daily. fluticasone 50 mcg/actuation nasal spray Commonly known as:  Juan Mitchell 2 Sprays by Both Nostrils route daily. hydroCHLOROthiazide 25 mg tablet Commonly known as:  HYDRODIURIL Take 1 Tab by mouth daily. inhalational spacing device For use with inhaler Prescriptions Sent to Pharmacy Refills  
 fluticasone (FLONASE) 50 mcg/actuation nasal spray 5 Si Sprays by Both Nostrils route daily. Class: Normal  
 Pharmacy: [address] [place] [address] Ph #: [phone] Route: Both Nostrils  
 cetirizine (ZYRTEC) 10 mg tablet 5 Sig: Take 1 Tab by mouth daily. Class: Normal  
 Pharmacy: [address] [place] [address] Ph #: [phone] Route: Oral  
 benzonatate (TESSALON) 200 mg capsule 0 Sig: Take 1 Cap by mouth three (3) times daily as needed for Cough for up to 7 days. Class: Normal  
 Pharmacy: [address] [place] [address] Ph #: [phone] Route: Oral  
  
Follow-up Instructions Return in about 4 weeks (around 1/10/2018), or if symptoms worsen or fail to improve, for annual physical.  
  
  
Patient Instructions Managing Your Allergies: Care Instructions Your Care Instructions Managing your allergies is an important part of staying healthy. Your doctor will help you find out what may be causing the allergies. Common causes of allergy symptoms are house dust and dust mites, animal dander, mold, and pollen. As soon as you know what triggers your symptoms, try to reduce your exposure to your triggers. This can help prevent allergy symptoms, asthma, and other health problems. Ask your doctor about allergy medicine or immunotherapy. These treatments may help reduce or prevent allergy symptoms. Follow-up care is a key part of your treatment and safety. Be sure to make and go to all appointments, and call your doctor if you are having problems. It's also a good idea to know your test results and keep a list of the medicines you take. How can you care for yourself at home? · If you think that dust or dust mites are causing your allergies: 
¨ Wash sheets, pillowcases, and other bedding every week in hot water. ¨ Use airtight, dust-proof covers for pillows, duvets, and mattresses. Avoid plastic covers, because they tend to tear quickly and do not \"breathe. \" Wash according to the instructions. ¨ Remove extra blankets and pillows that you don't need. ¨ Use blankets that are machine-washable. ¨ Don't use home humidifiers. They can help mites live longer. · Use air-conditioning. Change or clean all filters every month. Keep windows closed. Use high-efficiency air filters. Don't use window or attic fans, which draw dust into the air. · If you're allergic to pet dander, keep pets outside or, at the very least, out of your bedroom. Old carpet and cloth-covered furniture can hold a lot of animal dander. You may need to replace them. · Look for signs of cockroaches. Use cockroach baits to get rid of them. Then clean your home well. · If you're allergic to mold, don't keep indoor plants, because molds can grow in soil. Get rid of furniture, rugs, and drapes that smell musty. Check for mold in the bathroom. · If you're allergic to pollen, stay inside when pollen counts are high. · Don't smoke or let anyone else smoke in your house. Don't use fireplaces or wood-burning stoves.  Avoid paint fumes, perfumes, and other strong odors. 
When should you call for help? Give an epinephrine shot if: 
? · You think you are having a severe allergic reaction. ? After giving an epinephrine shot call 911, even if you feel better. ?Call 911 if: 
? · You have symptoms of a severe allergic reaction. These may include: 
¨ Sudden raised, red areas (hives) all over your body. ¨ Swelling of the throat, mouth, lips, or tongue. ¨ Trouble breathing. ¨ Passing out (losing consciousness). Or you may feel very lightheaded or suddenly feel weak, confused, or restless. ? · You have been given an epinephrine shot, even if you feel better. ?Call your doctor now or seek immediate medical care if: 
? · You have symptoms of an allergic reaction, such as: ¨ A rash or hives (raised, red areas on the skin). ¨ Itching. ¨ Swelling. ¨ Belly pain, nausea, or vomiting. ? Watch closely for changes in your health, and be sure to contact your doctor if: 
? · Your allergies get worse. ? · You need help controlling your allergies. ? · You have questions about allergy testing. ? · You do not get better as expected. Where can you learn more? Go to http://shruthi-ashley.info/. Enter L249 in the search box to learn more about \"Managing Your Allergies: Care Instructions. \" Current as of: September 29, 2016 Content Version: 11.4 © 4411-8330 [employer]. Care instructions adapted under license by [employer] (which disclaims liability or warranty for this information). If you have questions about a medical condition or this instruction, always ask your healthcare professional. [place] [address] any warranty or liability for your use of this information. Cough: Care Instructions Your Care Instructions A cough is your body's response to something that bothers your throat or airways. Many things can cause a cough.  You might cough because of a cold or the flu, bronchitis, or asthma. Smoking, postnasal drip, allergies, and stomach acid that backs up into your throat also can cause coughs. A cough is a symptom, not a disease. Most coughs stop when the cause, such as a cold, goes away. You can take a few steps at home to cough less and feel better. Follow-up care is a key part of your treatment and safety. Be sure to make and go to all appointments, and call your doctor if you are having problems. It's also a good idea to know your test results and keep a list of the medicines you take. How can you care for yourself at home? · Drink lots of water and other fluids. This helps thin the mucus and soothes a dry or sore throat. Honey or lemon juice in hot water or tea may ease a dry cough. · Take cough medicine as directed by your doctor. · Prop up your head on pillows to help you breathe and ease a dry cough. · Try cough drops to soothe a dry or sore throat. Cough drops don't stop a cough. Medicine-flavored cough drops are no better than candy-flavored drops or hard candy. · Do not smoke. Avoid secondhand smoke. If you need help quitting, talk to your doctor about stop-smoking programs and medicines. These can increase your chances of quitting for good. When should you call for help? Call 911 anytime you think you may need emergency care. For example, call if: 
? · You have severe trouble breathing. ?Call your doctor now or seek immediate medical care if: 
? · You cough up blood. ? · You have new or worse trouble breathing. ? · You have a new or higher fever. ? · You have a new rash. ? Watch closely for changes in your health, and be sure to contact your doctor if: 
? · You cough more deeply or more often, especially if you notice more mucus or a change in the color of your mucus. ? · You have new symptoms, such as a sore throat, an earache, or sinus pain. ? · You do not get better as expected. Where can you learn more? Go to http://shruthi-ashley.info/. Enter D279 in the search box to learn more about \"Cough: Care Instructions. \" Current as of: May 12, 2017 Content Version: 11.4 © 1203-6945 [employer]. Care instructions adapted under license by [employer] (which disclaims liability or warranty for this information). If you have questions about a medical condition or this instruction, always ask your healthcare professional. [address] any warranty or liability for your use of this information. Introducing [facility]! Dear Frances Wiley: 
Thank you for requesting a [employer] account. Our records indicate that you already have an active [employer] account. You can access your account anytime at [email or web address] Did you know that you can access your hospital and ER discharge instructions at any time in [employer] You can also review all of your test results from your hospital stay or ER visit. Additional Information If you have questions, please visit the Frequently Asked Questions section of the [employer] website at [email or web address] Remember, [employer] is NOT to be used for urgent needs. For medical emergencies, dial 911. Now available from your iPhone and Android! Please provide this summary of care documentation to your next provider. Your primary care clinician is listed as Prakash Ricardo. If you have any questions after today's visit, please call [phone].

## 2017-12-13 NOTE — PATIENT INSTRUCTIONS
Managing Your Allergies: Care Instructions  Your Care Instructions    Managing your allergies is an important part of staying healthy. Your doctor will help you find out what may be causing the allergies. Common causes of allergy symptoms are house dust and dust mites, animal dander, mold, and pollen. As soon as you know what triggers your symptoms, try to reduce your exposure to your triggers. This can help prevent allergy symptoms, asthma, and other health problems. Ask your doctor about allergy medicine or immunotherapy. These treatments may help reduce or prevent allergy symptoms. Follow-up care is a key part of your treatment and safety. Be sure to make and go to all appointments, and call your doctor if you are having problems. It's also a good idea to know your test results and keep a list of the medicines you take. How can you care for yourself at home? · If you think that dust or dust mites are causing your allergies:  ¨ Wash sheets, pillowcases, and other bedding every week in hot water. ¨ Use airtight, dust-proof covers for pillows, duvets, and mattresses. Avoid plastic covers, because they tend to tear quickly and do not \"breathe. \" Wash according to the instructions. ¨ Remove extra blankets and pillows that you don't need. ¨ Use blankets that are machine-washable. ¨ Don't use home humidifiers. They can help mites live longer. · Use air-conditioning. Change or clean all filters every month. Keep windows closed. Use high-efficiency air filters. Don't use window or attic fans, which draw dust into the air. · If you're allergic to pet dander, keep pets outside or, at the very least, out of your bedroom. Old carpet and cloth-covered furniture can hold a lot of animal dander. You may need to replace them. · Look for signs of cockroaches. Use cockroach baits to get rid of them. Then clean your home well. · If you're allergic to mold, don't keep indoor plants, because molds can grow in soil. Get rid of furniture, rugs, and drapes that smell musty. Check for mold in the bathroom. · If you're allergic to pollen, stay inside when pollen counts are high. · Don't smoke or let anyone else smoke in your house. Don't use fireplaces or wood-burning stoves. Avoid paint fumes, perfumes, and other strong odors. When should you call for help? Give an epinephrine shot if:  ? · You think you are having a severe allergic reaction. ? After giving an epinephrine shot call 911, even if you feel better. ?Call 911 if:  ? · You have symptoms of a severe allergic reaction. These may include:  ¨ Sudden raised, red areas (hives) all over your body. ¨ Swelling of the throat, mouth, lips, or tongue. ¨ Trouble breathing. ¨ Passing out (losing consciousness). Or you may feel very lightheaded or suddenly feel weak, confused, or restless. ? · You have been given an epinephrine shot, even if you feel better. ?Call your doctor now or seek immediate medical care if:  ? · You have symptoms of an allergic reaction, such as:  ¨ A rash or hives (raised, red areas on the skin). ¨ Itching. ¨ Swelling. ¨ Belly pain, nausea, or vomiting. ? Watch closely for changes in your health, and be sure to contact your doctor if:  ? · Your allergies get worse. ? · You need help controlling your allergies. ? · You have questions about allergy testing. ? · You do not get better as expected. Where can you learn more? Go to http://shruthi-ashley.info/. Enter L249 in the search box to learn more about \"Managing Your Allergies: Care Instructions. \"  Current as of: September 29, 2016  Content Version: 11.4  © 5597-2447 [employer]. Care instructions adapted under license by [employer] (which disclaims liability or warranty for this information).  If you have questions about a medical condition or this instruction, always ask your healthcare professional. Neelam Reeves any warranty or liability for your use of this information. Cough: Care Instructions  Your Care Instructions    A cough is your body's response to something that bothers your throat or airways. Many things can cause a cough. You might cough because of a cold or the flu, bronchitis, or asthma. Smoking, postnasal drip, allergies, and stomach acid that backs up into your throat also can cause coughs. A cough is a symptom, not a disease. Most coughs stop when the cause, such as a cold, goes away. You can take a few steps at home to cough less and feel better. Follow-up care is a key part of your treatment and safety. Be sure to make and go to all appointments, and call your doctor if you are having problems. It's also a good idea to know your test results and keep a list of the medicines you take. How can you care for yourself at home? · Drink lots of water and other fluids. This helps thin the mucus and soothes a dry or sore throat. Honey or lemon juice in hot water or tea may ease a dry cough. · Take cough medicine as directed by your doctor. · Prop up your head on pillows to help you breathe and ease a dry cough. · Try cough drops to soothe a dry or sore throat. Cough drops don't stop a cough. Medicine-flavored cough drops are no better than candy-flavored drops or hard candy. · Do not smoke. Avoid secondhand smoke. If you need help quitting, talk to your doctor about stop-smoking programs and medicines. These can increase your chances of quitting for good. When should you call for help? Call 911 anytime you think you may need emergency care. For example, call if:  ? · You have severe trouble breathing. ?Call your doctor now or seek immediate medical care if:  ? · You cough up blood. ? · You have new or worse trouble breathing. ? · You have a new or higher fever. ? · You have a new rash. ? Watch closely for changes in your health, and be sure to contact your doctor if:  ? · You cough more deeply or more often, especially if you notice more mucus or a change in the color of your mucus. ? · You have new symptoms, such as a sore throat, an earache, or sinus pain. ? · You do not get better as expected. Where can you learn more? Go to http://shruthi-ashley.info/. Enter D279 in the search box to learn more about \"Cough: Care Instructions. \"  Current as of: May 12, 2017  Content Version: 11.4  © 6068-6953 [employer]. Care instructions adapted under license by [employer] (which disclaims liability or warranty for this information). If you have questions about a medical condition or this instruction, always ask your healthcare professional. [address] any warranty or liability for your use of this information.

## 2018-01-15 ENCOUNTER — OFFICE VISIT (OUTPATIENT)
Dept: FAMILY MEDICINE CLINIC | Age: 53
End: 2018-01-15

## 2018-01-15 VITALS
SYSTOLIC BLOOD PRESSURE: 131 MMHG | HEART RATE: 76 BPM | TEMPERATURE: 98.1 F | BODY MASS INDEX: 35.26 KG/M2 | DIASTOLIC BLOOD PRESSURE: 88 MMHG | WEIGHT: 199 LBS | OXYGEN SATURATION: 96 % | RESPIRATION RATE: 18 BRPM | HEIGHT: 63 IN

## 2018-01-15 DIAGNOSIS — R50.9 FEVER, UNSPECIFIED FEVER CAUSE: Primary | ICD-10-CM

## 2018-01-15 LAB
FLUAV+FLUBV AG NOSE QL IA.RAPID: NEGATIVE POS/NEG
FLUAV+FLUBV AG NOSE QL IA.RAPID: NEGATIVE POS/NEG
S PYO AG THROAT QL: NEGATIVE
VALID INTERNAL CONTROL?: YES
VALID INTERNAL CONTROL?: YES

## 2018-01-15 NOTE — PROGRESS NOTES
Chief Complaint   Patient presents with    Cough     febrile on 1/15 at 101,     Diarrhea    Vomiting     x2    Nausea     she is a 47y.o. year old female who presents for evalution. She has had three episodes of a cold since before thanksgiving  The most recent started 4 days ago. She has a cough, was vomitting and had diarrhea. The last v or D was yesterday  Yesterday she had a temp 101, none today. Feels better except the cough  Her  had strep 2 weeks ago  She denies a sore throat, no rashes  She has alternating white and yellow phlegm the last time was this morning  She has never smoked, and no second hand  Reviewed PmHx, RxHx, FmHx, SocHx, AllgHx and updated and dated in the chart. Aspirin yes ____   No____ N/A____    Patient Active Problem List    Diagnosis    Prediabetes    Obesity, Class I, BMI 30-34.9    Essential hypertension with goal blood pressure less than 130/80       Nurse notes were reviewed and copied and are correct  Review of Systems - negative except as listed above in the HPI    Objective:     Vitals:    01/15/18 0713   BP: 131/88   Pulse: 76   Resp: 18   Temp: 98.1 °F (36.7 °C)   TempSrc: Oral   SpO2: 96%   Weight: 199 lb (90.3 kg)   Height: 5' 2.5\" (1.588 m)        Physical Examination: General appearance - alert, well appearing, and in no distress  Mental status - alert, oriented to person, place, and time  Ears - bilateral TM's and external ear canals normal  Mouth - mucous membranes moist, pharynx normal without lesions  Neck - supple, no significant adenopathy,  tender right ant cerv  But no node palpated  Lymphatics - no hepatosplenomegaly  Chest - clear to auscultation, no wheezes, rales or rhonchi, symmetric air entry  Heart - normal rate, regular rhythm, normal S1, S2, no murmurs, rubs, clicks or gallops  Skin - normal coloration and turgor, no rashes, no suspicious skin lesions noted      Assessment/ Plan:   Diagnoses and all orders for this visit:    1.  Fever, unspecified fever cause  -     AMB POC RAPID STREP A  -     AMB POC ARUNA INFLUENZA A/B TEST     call in 5 days if still producing yellow phlegm  The throat looks normal there is no enlarged node. The muscle is tender from the cough. Get more rest and vit c  She looks wekk except the occassional cough    Follow-up Disposition:  Return if symptoms worsen or fail to improve. ICD-10-CM ICD-9-CM    1. Fever, unspecified fever cause R50.9 780.60 AMB POC RAPID STREP A      AMB POC ARUNA INFLUENZA A/B TEST       I have discussed the diagnosis with the patient and the intended plan as seen in the above orders. The patient has received an after-visit summary and questions were answered concerning future plans. Medication Side Effects and Warnings were discussed with patient: yes  Patient Labs were reviewed and or requested: yes  Patient Past Records were reviewed and or requested: yes        There are no Patient Instructions on file for this visit.     The patient verbalizes understanding and agrees with the plan of care        Patient has the advanced directives booklet to review

## 2018-01-15 NOTE — PROGRESS NOTES
1. Have you been to the ER, urgent care clinic since your last visit? Hospitalized since your last visit? No    2. Have you seen or consulted any other health care providers outside of the [address] [place] [address] since your last visit? Include any pap smears or colon screening.  No     Chief Complaint   Patient presents with    Cough     febrile on 1/15 at 101,     Diarrhea    Vomiting     x2    Nausea

## 2018-01-15 NOTE — MR AVS SNAPSHOT
Visit Information Date & Time Provider Department Dept. Phone Encounter #  
 1/15/2018  7:00 AM Viola Mehta MD [address] [place] [address] [id number] Follow-up Instructions Return if symptoms worsen or fail to improve. Upcoming Health Maintenance Date Due FOBT Q 1 YEAR AGE 50-75 11/26/2016 BREAST CANCER SCRN MAMMOGRAM 11/18/2018 DTaP/Tdap/Td series (2 - Td) 11/17/2026 Allergies as of 1/15/2018  Review Complete On: 1/15/2018 By: Viola Mehta MD  
  
 Severity Noted Reaction Type Reactions Pcn [Penicillins]  06/19/2015    Rash Pcn [Penicillins]  05/26/2016    Rash, Swelling Percocet [Oxycodone-acetaminophen]  06/09/2016    Other (comments)  
 hypotension Sulfa (Sulfonamide Antibiotics)  06/19/2015    Hives Sulfa (Sulfonamide Antibiotics)  05/26/2016    Rash, Swelling Current Immunizations  Reviewed on 11/17/2016 Name Date Influenza Vaccine (Quad) PF 11/17/2016 Tdap 11/17/2016 Not reviewed this visit You Were Diagnosed With   
  
 Codes Comments Fever, unspecified fever cause    -  Primary ICD-10-CM: R50.9 ICD-9-CM: 780.60 Vitals BP Pulse Temp Resp Height(growth percentile) Weight(growth percentile) 131/88 76 98.1 °F (36.7 °C) (Oral) 18 5' 2.5\" (1.588 m) 199 lb (90.3 kg) SpO2 BMI OB Status Smoking Status 96% 35.82 kg/m2 Hysterectomy Never Smoker Vitals History BMI and BSA Data Body Mass Index Body Surface Area  
 35.82 kg/m 2 2 m 2 Preferred Pharmacy Pharmacy Name Phone [id number] [phone] Your Updated Medication List  
  
   
This list is accurate as of: 1/15/18  8:13 AM.  Always use your most recent med list.  
  
  
  
  
 albuterol 90 mcg/actuation inhaler Commonly known as:  PROVENTIL HFA, VENTOLIN HFA, PROAIR HFA  
 Take 2 Puffs by inhalation every four (4) hours as needed for Wheezing. cetirizine 10 mg tablet Commonly known as:  ZYRTEC Take 1 Tab by mouth daily. fluticasone 50 mcg/actuation nasal spray Commonly known as:  Daniel Remedies 2 Sprays by Both Nostrils route daily. hydroCHLOROthiazide 25 mg tablet Commonly known as:  HYDRODIURIL Take 1 Tab by mouth daily. inhalational spacing device For use with inhaler We Performed the Following AMB POC RAPID STREP A [97936 CPT(R)] AMB POC ARUNA INFLUENZA A/B TEST [13697 CPT(R)] Follow-up Instructions Return if symptoms worsen or fail to improve. Introducing [facility]! Dear Joselin Heath: 
Thank you for requesting a [employer] account. Our records indicate that you already have an active [employer] account. You can access your account anytime at [email or web address] Did you know that you can access your hospital and ER discharge instructions at any time in [employer]? You can also review all of your test results from your hospital stay or ER visit. Additional Information If you have questions, please visit the Frequently Asked Questions section of the [employer] website at [email or web address] Remember, [employer] is NOT to be used for urgent needs. For medical emergencies, dial 911. Now available from your iPhone and Android! Please provide this summary of care documentation to your next provider. Your primary care clinician is listed as Ricardo Dancer. If you have any questions after today's visit, please call [phone].

## 2018-01-23 ENCOUNTER — OFFICE VISIT (OUTPATIENT)
Dept: BARIATRICS/WEIGHT MGMT | Age: 53
End: 2018-01-23

## 2018-01-23 DIAGNOSIS — E66.9 OBESITY, CLASS I, BMI 30-34.9: Primary | ICD-10-CM

## 2018-02-01 NOTE — PROGRESS NOTES
Patient attended a Medically Supervised Weight Loss New Patient Orientation today where we discussed:  - New Direction Very Low Calorie Diet details  - Medical Supervision  - Nutrition education  - Cost  - Policies and compliance required for program enrollment. - Lab slip was given to patient with instructions for having them drawn. Patients initial consultation with physician is tentatively scheduled for:  Future Appointments  Date Time Provider Lilo Baires   2/1/2018 9:15 AM Monique Shook MD [address] [facility] starting weight was documented as: There were no vitals taken for this visit. Patient attended a Medically Supervised Weight Loss New Patient Orientation today where we discussed:  - New Direction Very Low Calorie Diet details  - Medical Supervision  - Nutrition education  - Cost  - Policies and compliance required for program enrollment. - Lab slip was given to patient with instructions for having them drawn.   Patients initial consultation with physician is tentatively scheduled for:  Future Appointments  Date Time Provider Lilo Baires   2/1/2018 9:15 AM Monique Shook MD [address] [facility]

## 2018-02-22 ENCOUNTER — OFFICE VISIT (OUTPATIENT)
Dept: FAMILY MEDICINE CLINIC | Age: 53
End: 2018-02-22

## 2018-02-22 VITALS
DIASTOLIC BLOOD PRESSURE: 88 MMHG | WEIGHT: 198 LBS | BODY MASS INDEX: 35.08 KG/M2 | HEART RATE: 106 BPM | OXYGEN SATURATION: 95 % | HEIGHT: 63 IN | SYSTOLIC BLOOD PRESSURE: 133 MMHG | TEMPERATURE: 100.6 F | RESPIRATION RATE: 19 BRPM

## 2018-02-22 DIAGNOSIS — R50.9 FEVER, UNSPECIFIED FEVER CAUSE: ICD-10-CM

## 2018-02-22 DIAGNOSIS — J11.1 INFLUENZA: Primary | ICD-10-CM

## 2018-02-22 LAB
QUICKVUE INFLUENZA TEST: NEGATIVE
S PYO AG THROAT QL: NEGATIVE
VALID INTERNAL CONTROL?: YES
VALID INTERNAL CONTROL?: YES

## 2018-02-22 RX ORDER — OSELTAMIVIR PHOSPHATE 75 MG/1
75 CAPSULE ORAL 2 TIMES DAILY
Qty: 10 CAP | Refills: 0 | Status: SHIPPED | OUTPATIENT
Start: 2018-02-22 | End: 2018-02-27

## 2018-02-22 NOTE — PROGRESS NOTES
1. Have you been to the ER, urgent care clinic since your last visit? Hospitalized since your last visit? No    2. Have you seen or consulted any other health care providers outside of the [address] [place] [address] since your last visit? Include any pap smears or colon screening.  No     Chief Complaint   Patient presents with    Chills    Generalized Body Aches    Fever

## 2018-02-22 NOTE — PATIENT INSTRUCTIONS
Influenza (Flu): Care Instructions  Your Care Instructions    Influenza (flu) is an infection in the lungs and breathing passages. It is caused by the influenza virus. There are different strains, or types, of the flu virus from year to year. Unlike the common cold, the flu comes on suddenly and the symptoms, such as a cough, congestion, fever, chills, fatigue, aches, and pains, are more severe. These symptoms may last up to 10 days. Although the flu can make you feel very sick, it usually doesn't cause serious health problems. Home treatment is usually all you need for flu symptoms. But your doctor may prescribe antiviral medicine to prevent other health problems, such as pneumonia, from developing. Older people and those who have a long-term health condition, such as lung disease, are most at risk for having pneumonia or other health problems. Follow-up care is a key part of your treatment and safety. Be sure to make and go to all appointments, and call your doctor if you are having problems. It's also a good idea to know your test results and keep a list of the medicines you take. How can you care for yourself at home? · Get plenty of rest.  · Drink plenty of fluids, enough so that your urine is light yellow or clear like water. If you have kidney, heart, or liver disease and have to limit fluids, talk with your doctor before you increase the amount of fluids you drink. · Take an over-the-counter pain medicine if needed, such as acetaminophen (Tylenol), ibuprofen (Advil, Motrin), or naproxen (Aleve), to relieve fever, headache, and muscle aches. Read and follow all instructions on the label. No one younger than 20 should take aspirin. It has been linked to Reye syndrome, a serious illness. · Do not smoke. Smoking can make the flu worse. If you need help quitting, talk to your doctor about stop-smoking programs and medicines. These can increase your chances of quitting for good.   · Breathe moist air from a hot shower or from a sink filled with hot water to help clear a stuffy nose. · Before you use cough and cold medicines, check the label. These medicines may not be safe for young children or for people with certain health problems. · If the skin around your nose and lips becomes sore, put some petroleum jelly on the area. · To ease coughing:  ¨ Drink fluids to soothe a scratchy throat. ¨ Suck on cough drops or plain hard candy. ¨ Take an over-the-counter cough medicine that contains dextromethorphan to help you get some sleep. Read and follow all instructions on the label. ¨ Raise your head at night with an extra pillow. This may help you rest if coughing keeps you awake. · Take any prescribed medicine exactly as directed. Call your doctor if you think you are having a problem with your medicine. To avoid spreading the flu  · Wash your hands regularly, and keep your hands away from your face. · Stay home from school, work, and other public places until you are feeling better and your fever has been gone for at least 24 hours. The fever needs to have gone away on its own without the help of medicine. · Ask people living with you to talk to their doctors about preventing the flu. They may get antiviral medicine to keep from getting the flu from you. · To prevent the flu in the future, get a flu vaccine every fall. Encourage people living with you to get the vaccine. · Cover your mouth when you cough or sneeze. When should you call for help? Call 911 anytime you think you may need emergency care. For example, call if:  ? · You have severe trouble breathing. ?Call your doctor now or seek immediate medical care if:  ? · You have new or worse trouble breathing. ? · You seem to be getting much sicker. ? · You feel very sleepy or confused. ? · You have a new or higher fever. ? · You get a new rash. ? Watch closely for changes in your health, and be sure to contact your doctor if:  ? · You begin to get better and then get worse. ? · You are not getting better after 1 week. Where can you learn more? Go to http://shruthi-ashley.info/. Enter Z282 in the search box to learn more about \"Influenza (Flu): Care Instructions. \"  Current as of: May 12, 2017  Content Version: 11.4  © 4432-8098 [employer]. Care instructions adapted under license by [employer] (which disclaims liability or warranty for this information). If you have questions about a medical condition or this instruction, always ask your healthcare professional. [place] [address] any warranty or liability for your use of this information. Learning About Fever  What is a fever? A fever is a high body temperature. It's one way your body fights being sick. A fever shows that the body is responding to infection or other illnesses, both minor and severe. A fever is a symptom, not an illness by itself. A fever can be a sign that you are ill, but most fevers are not caused by a serious problem. You may have a fever with a minor illness, such as a cold. But sometimes a very serious infection may cause little or no fever. It is important to look at other symptoms, other conditions you have, and how you feel in general. In children, notice how they act and see what symptoms they complain of. What is a normal body temperature? A normal body temperature is about 98. 6ºF. Some people have a normal temperature that is a little higher or a little lower than this. Your temperature may be a little lower in the morning than it is later in the day. It may go up during hot weather or when you exercise, wear heavy clothes, or take a hot bath. Your temperature may also be different depending on how you take it. A temperature taken in the mouth (oral) or under the arm may be a little lower than your core temperature (rectal). What is a fever temperature?   A core temperature of 100.4°F or above is considered a fever. What can cause a fever? A fever may be caused by:  · Infections. This is the most common cause of a fever. Examples of infections that can cause a fever include the flu, a kidney infection, or pneumonia. · Some medicines. · Severe trauma or injury, such as a heart attack, stroke, heatstroke, or burns. · Other medical conditions, such as arthritis and some cancers. How can you treat a fever at home? · Ask your doctor if you can take an over-the-counter pain medicine, such as acetaminophen (Tylenol), ibuprofen (Advil, Motrin), or naproxen (Aleve). Be safe with medicines. Read and follow all instructions on the label. · To prevent dehydration, drink plenty of fluids. Choose water and other caffeine-free clear liquids until you feel better. If you have kidney, heart, or liver disease and have to limit fluids, talk with your doctor before you increase the amount of fluids you drink. Follow-up care is a key part of your treatment and safety. Be sure to make and go to all appointments, and call your doctor if you are having problems. It's also a good idea to know your test results and keep a list of the medicines you take. Where can you learn more? Go to http://shruthi-ashley.info/. Enter M972 in the search box to learn more about \"Learning About Fever. \"  Current as of: March 20, 2017  Content Version: 11.4  © 8752-9987 [employer]. Care instructions adapted under license by [employer] (which disclaims liability or warranty for this information). If you have questions about a medical condition or this instruction, always ask your healthcare professional. [address] any warranty or liability for your use of this information.

## 2018-02-22 NOTE — MR AVS SNAPSHOT
[address] [facility] [address] 
[phone] Patient: Lisa Thomas 
MRN: [id number] :1965 Visit Information Date & Time Provider Department Dept. Phone Encounter #  
 2018  4:00 PM Ila Manriquez MD [address] [place] [address] [id number] Upcoming Health Maintenance Date Due FOBT Q 1 YEAR AGE 50-75 2016 BREAST CANCER SCRN MAMMOGRAM 2018 DTaP/Tdap/Td series (2 - Td) 2026 Allergies as of 2018  Review Complete On: 2018 By: Nick Flowers LPN Severity Noted Reaction Type Reactions Pcn [Penicillins]  2015    Rash Pcn [Penicillins]  2016    Rash, Swelling Percocet [Oxycodone-acetaminophen]  2016    Other (comments)  
 hypotension Sulfa (Sulfonamide Antibiotics)  2015    Hives Sulfa (Sulfonamide Antibiotics)  2016    Rash, Swelling Current Immunizations  Reviewed on 2016 Name Date Influenza Vaccine (Quad) PF 2016 Tdap 2016 Not reviewed this visit You Were Diagnosed With   
  
 Codes Comments Influenza    -  Primary ICD-10-CM: J11.1 ICD-9-CM: 535.1 Fever, unspecified fever cause     ICD-10-CM: R50.9 ICD-9-CM: 780.60 Vitals BP Pulse Temp Resp Height(growth percentile) Weight(growth percentile) 133/88 (!) 106 (!) 100.6 °F (38.1 °C) (Oral) 19 5' 2.5\" (1.588 m) 198 lb (89.8 kg) SpO2 BMI OB Status Smoking Status 95% 35.64 kg/m2 Hysterectomy Never Smoker Vitals History BMI and BSA Data Body Mass Index Body Surface Area  
 35.64 kg/m 2 1.99 m 2 Preferred Pharmacy Pharmacy Name Phone [id number] [address] [phone] Your Updated Medication List  
  
   
This list is accurate as of 18  4:27 PM.  Always use your most recent med list.  
  
  
  
  
 albuterol 90 mcg/actuation inhaler Commonly known as:  PROVENTIL HFA, VENTOLIN HFA, PROAIR HFA Take 2 Puffs by inhalation every four (4) hours as needed for Wheezing. cetirizine 10 mg tablet Commonly known as:  ZYRTEC Take 1 Tab by mouth daily. fluticasone 50 mcg/actuation nasal spray Commonly known as:  Jeni Maia 2 Sprays by Both Nostrils route daily. hydroCHLOROthiazide 25 mg tablet Commonly known as:  HYDRODIURIL Take 1 Tab by mouth daily. inhalational spacing device For use with inhaler  
  
 oseltamivir 75 mg capsule Commonly known as:  TAMIFLU Take 1 Cap by mouth two (2) times a day for 5 days. Prescriptions Sent to Pharmacy Refills  
 oseltamivir (TAMIFLU) 75 mg capsule 0 Sig: Take 1 Cap by mouth two (2) times a day for 5 days. Class: Normal  
 Pharmacy: [address] [place] [address], [address] [place] [address] #: [phone] Route: Oral  
  
We Performed the Following AMB POC RAPID INFLUENZA TEST [40470 CPT(R)] AMB POC RAPID STREP A [10788 CPT(R)] Patient Instructions Influenza (Flu): Care Instructions Your Care Instructions Influenza (flu) is an infection in the lungs and breathing passages. It is caused by the influenza virus. There are different strains, or types, of the flu virus from year to year. Unlike the common cold, the flu comes on suddenly and the symptoms, such as a cough, congestion, fever, chills, fatigue, aches, and pains, are more severe. These symptoms may last up to 10 days. Although the flu can make you feel very sick, it usually doesn't cause serious health problems. Home treatment is usually all you need for flu symptoms. But your doctor may prescribe antiviral medicine to prevent other health problems, such as pneumonia, from developing.  Older people and those who have a long-term health condition, such as lung disease, are most at risk for having pneumonia or other health problems. Follow-up care is a key part of your treatment and safety. Be sure to make and go to all appointments, and call your doctor if you are having problems. It's also a good idea to know your test results and keep a list of the medicines you take. How can you care for yourself at home? · Get plenty of rest. 
· Drink plenty of fluids, enough so that your urine is light yellow or clear like water. If you have kidney, heart, or liver disease and have to limit fluids, talk with your doctor before you increase the amount of fluids you drink. · Take an over-the-counter pain medicine if needed, such as acetaminophen (Tylenol), ibuprofen (Advil, Motrin), or naproxen (Aleve), to relieve fever, headache, and muscle aches. Read and follow all instructions on the label. No one younger than 20 should take aspirin. It has been linked to Reye syndrome, a serious illness. · Do not smoke. Smoking can make the flu worse. If you need help quitting, talk to your doctor about stop-smoking programs and medicines. These can increase your chances of quitting for good. · Breathe moist air from a hot shower or from a sink filled with hot water to help clear a stuffy nose. · Before you use cough and cold medicines, check the label. These medicines may not be safe for young children or for people with certain health problems. · If the skin around your nose and lips becomes sore, put some petroleum jelly on the area. · To ease coughing: ¨ Drink fluids to soothe a scratchy throat. ¨ Suck on cough drops or plain hard candy. ¨ Take an over-the-counter cough medicine that contains dextromethorphan to help you get some sleep. Read and follow all instructions on the label. ¨ Raise your head at night with an extra pillow. This may help you rest if coughing keeps you awake. · Take any prescribed medicine exactly as directed. Call your doctor if you think you are having a problem with your medicine. To avoid spreading the flu · Wash your hands regularly, and keep your hands away from your face. · Stay home from school, work, and other public places until you are feeling better and your fever has been gone for at least 24 hours. The fever needs to have gone away on its own without the help of medicine. · Ask people living with you to talk to their doctors about preventing the flu. They may get antiviral medicine to keep from getting the flu from you. · To prevent the flu in the future, get a flu vaccine every fall. Encourage people living with you to get the vaccine. · Cover your mouth when you cough or sneeze. When should you call for help? Call 911 anytime you think you may need emergency care. For example, call if: 
? · You have severe trouble breathing. ?Call your doctor now or seek immediate medical care if: 
? · You have new or worse trouble breathing. ? · You seem to be getting much sicker. ? · You feel very sleepy or confused. ? · You have a new or higher fever. ? · You get a new rash. ? Watch closely for changes in your health, and be sure to contact your doctor if: 
? · You begin to get better and then get worse. ? · You are not getting better after 1 week. Where can you learn more? Go to http://shruthi-ashley.info/. Enter J365 in the search box to learn more about \"Influenza (Flu): Care Instructions. \" Current as of: May 12, 2017 Content Version: 11.4 © 0710-9073 [employer]. Care instructions adapted under license by [employer] (which disclaims liability or warranty for this information). If you have questions about a medical condition or this instruction, always ask your healthcare professional. [place] [address] any warranty or liability for your use of this information. Learning About Fever What is a fever? A fever is a high body temperature.  It's one way your body fights being sick. A fever shows that the body is responding to infection or other illnesses, both minor and severe. A fever is a symptom, not an illness by itself. A fever can be a sign that you are ill, but most fevers are not caused by a serious problem. You may have a fever with a minor illness, such as a cold. But sometimes a very serious infection may cause little or no fever. It is important to look at other symptoms, other conditions you have, and how you feel in general. In children, notice how they act and see what symptoms they complain of. What is a normal body temperature? A normal body temperature is about 98. 6ºF. Some people have a normal temperature that is a little higher or a little lower than this. Your temperature may be a little lower in the morning than it is later in the day. It may go up during hot weather or when you exercise, wear heavy clothes, or take a hot bath. Your temperature may also be different depending on how you take it. A temperature taken in the mouth (oral) or under the arm may be a little lower than your core temperature (rectal). What is a fever temperature? A core temperature of 100.4°F or above is considered a fever. What can cause a fever? A fever may be caused by: · Infections. This is the most common cause of a fever. Examples of infections that can cause a fever include the flu, a kidney infection, or pneumonia. · Some medicines. · Severe trauma or injury, such as a heart attack, stroke, heatstroke, or burns. · Other medical conditions, such as arthritis and some cancers. How can you treat a fever at home? · Ask your doctor if you can take an over-the-counter pain medicine, such as acetaminophen (Tylenol), ibuprofen (Advil, Motrin), or naproxen (Aleve). Be safe with medicines. Read and follow all instructions on the label. · To prevent dehydration, drink plenty of fluids. Choose water and other caffeine-free clear liquids until you feel better.  If you have kidney, heart, or liver disease and have to limit fluids, talk with your doctor before you increase the amount of fluids you drink. Follow-up care is a key part of your treatment and safety. Be sure to make and go to all appointments, and call your doctor if you are having problems. It's also a good idea to know your test results and keep a list of the medicines you take. Where can you learn more? Go to http://shruthi-ashley.info/. Enter S164 in the search box to learn more about \"Learning About Fever. \" Current as of: March 20, 2017 Content Version: 11.4 © 8807-1712 [employer]. Care instructions adapted under license by [employer] (which disclaims liability or warranty for this information). If you have questions about a medical condition or this instruction, always ask your healthcare professional. [address] any warranty or liability for your use of this information. Introducing \[facility] [place] [facility]\"" & HEALTH SERVICES! Dear Arthur Qureshi: 
Thank you for requesting a Innovand account. Our records indicate that you already have an active Innovand account. You can access your account anytime at [email or web address]. [email or web address] Did you know that you can access your hospital and ER discharge instructions at any time in Innovand? You can also review all of your test results from your hospital stay or ER visit. Additional Information If you have questions, please visit the Frequently Asked Questions section of the Innovand website at [email or web address] [email or web address] Remember, Innovand is NOT to be used for urgent needs. For medical emergencies, dial 911. Now available from your iPhone and Android! Please provide this summary of care documentation to your next provider. Your primary care clinician is listed as Natividad Christie. If you have any questions after today's visit, please call [phone].

## 2018-02-22 NOTE — PROGRESS NOTES
Chief Complaint   Patient presents with    Chills    Generalized Body Aches    Fever     she is a 47y.o. year old female who presents for evalution. Had temp 101 this morning  C/o severe body aches. Not really sore throat  No known exposure to flu but there is an epidemic in the area    Reviewed PmHx, RxHx, FmHx, SocHx, AllgHx and updated and dated in the chart. Aspirin yes ____   No____ N/A____    Patient Active Problem List    Diagnosis    Prediabetes    Obesity, Class I, BMI 30-34.9    Essential hypertension with goal blood pressure less than 130/80       Nurse notes were reviewed and copied and are correct  Review of Systems - negative except as listed above in the HPI    Objective:     Vitals:    02/22/18 1557   BP: 133/88   Pulse: (!) 106   Resp: 19   Temp: (!) 100.6 °F (38.1 °C)   TempSrc: Oral   SpO2: 95%   Weight: 198 lb (89.8 kg)   Height: 5' 2.5\" (1.588 m)        Physical Examination: General appearance - alert, well appearing, and in no distress  Mental status - alert, oriented to person, place, and time  Ears - bilateral TM's and external ear canals normal  Mouth - mucous membranes moist, pharynx normal without lesions  Neck - supple, no significant adenopathy  Chest - clear to auscultation, no wheezes, rales or rhonchi, symmetric air entry  Heart - normal rate, regular rhythm, normal S1, S2, no murmurs, rubs, clicks or gallops  Skin - normal coloration and turgor, no rashes, no suspicious skin lesions noted  Results for orders placed or performed in visit on 02/22/18   AMB POC RAPID INFLUENZA TEST   Result Value Ref Range    VALID INTERNAL CONTROL POC Yes     QuickVue Influenza test Negative Negative   AMB POC RAPID STREP A   Result Value Ref Range    VALID INTERNAL CONTROL POC Yes     Group A Strep Ag Negative Negative         Assessment/ Plan:   Diagnoses and all orders for this visit:    1. Influenza  -     oseltamivir (TAMIFLU) 75 mg capsule;  Take 1 Cap by mouth two (2) times a day for 5 days.  I am treating for influenza even though the poc test is negative based on her signs and symptoms and the fact that there is an epidemic in the area  2. Fever, unspecified fever cause  -     AMB POC RAPID INFLUENZA TEST  -     AMB POC RAPID STREP A       Follow-up Disposition: Not on File    ICD-10-CM ICD-9-CM    1. Influenza J11.1 487.1 oseltamivir (TAMIFLU) 75 mg capsule   2. Fever, unspecified fever cause R50.9 780.60 AMB POC RAPID INFLUENZA TEST      AMB POC RAPID STREP A       I have discussed the diagnosis with the patient and the intended plan as seen in the above orders. The patient has received an after-visit summary and questions were answered concerning future plans. Medication Side Effects and Warnings were discussed with patient: yes  Patient Labs were reviewed and or requested: yes  Patient Past Records were reviewed and or requested: yes        Patient Instructions          Influenza (Flu): Care Instructions  Your Care Instructions    Influenza (flu) is an infection in the lungs and breathing passages. It is caused by the influenza virus. There are different strains, or types, of the flu virus from year to year. Unlike the common cold, the flu comes on suddenly and the symptoms, such as a cough, congestion, fever, chills, fatigue, aches, and pains, are more severe. These symptoms may last up to 10 days. Although the flu can make you feel very sick, it usually doesn't cause serious health problems. Home treatment is usually all you need for flu symptoms. But your doctor may prescribe antiviral medicine to prevent other health problems, such as pneumonia, from developing. Older people and those who have a long-term health condition, such as lung disease, are most at risk for having pneumonia or other health problems. Follow-up care is a key part of your treatment and safety. Be sure to make and go to all appointments, and call your doctor if you are having problems.  It's also a good idea to know your test results and keep a list of the medicines you take. How can you care for yourself at home? · Get plenty of rest.  · Drink plenty of fluids, enough so that your urine is light yellow or clear like water. If you have kidney, heart, or liver disease and have to limit fluids, talk with your doctor before you increase the amount of fluids you drink. · Take an over-the-counter pain medicine if needed, such as acetaminophen (Tylenol), ibuprofen (Advil, Motrin), or naproxen (Aleve), to relieve fever, headache, and muscle aches. Read and follow all instructions on the label. No one younger than 20 should take aspirin. It has been linked to Reye syndrome, a serious illness. · Do not smoke. Smoking can make the flu worse. If you need help quitting, talk to your doctor about stop-smoking programs and medicines. These can increase your chances of quitting for good. · Breathe moist air from a hot shower or from a sink filled with hot water to help clear a stuffy nose. · Before you use cough and cold medicines, check the label. These medicines may not be safe for young children or for people with certain health problems. · If the skin around your nose and lips becomes sore, put some petroleum jelly on the area. · To ease coughing:  ¨ Drink fluids to soothe a scratchy throat. ¨ Suck on cough drops or plain hard candy. ¨ Take an over-the-counter cough medicine that contains dextromethorphan to help you get some sleep. Read and follow all instructions on the label. ¨ Raise your head at night with an extra pillow. This may help you rest if coughing keeps you awake. · Take any prescribed medicine exactly as directed. Call your doctor if you think you are having a problem with your medicine. To avoid spreading the flu  · Wash your hands regularly, and keep your hands away from your face.   · Stay home from school, work, and other public places until you are feeling better and your fever has been gone for at least 24 hours. The fever needs to have gone away on its own without the help of medicine. · Ask people living with you to talk to their doctors about preventing the flu. They may get antiviral medicine to keep from getting the flu from you. · To prevent the flu in the future, get a flu vaccine every fall. Encourage people living with you to get the vaccine. · Cover your mouth when you cough or sneeze. When should you call for help? Call 911 anytime you think you may need emergency care. For example, call if:  ? · You have severe trouble breathing. ?Call your doctor now or seek immediate medical care if:  ? · You have new or worse trouble breathing. ? · You seem to be getting much sicker. ? · You feel very sleepy or confused. ? · You have a new or higher fever. ? · You get a new rash. ? Watch closely for changes in your health, and be sure to contact your doctor if:  ? · You begin to get better and then get worse. ? · You are not getting better after 1 week. Where can you learn more? Go to http://shruthi-ashley.info/. Enter S069 in the search box to learn more about \"Influenza (Flu): Care Instructions. \"  Current as of: May 12, 2017  Content Version: 11.4  © 7846-6716 CodeSealer. Care instructions adapted under license by [employer] (which disclaims liability or warranty for this information). If you have questions about a medical condition or this instruction, always ask your healthcare professional. [place] [address] any warranty or liability for your use of this information. Learning About Fever  What is a fever? A fever is a high body temperature. It's one way your body fights being sick. A fever shows that the body is responding to infection or other illnesses, both minor and severe. A fever is a symptom, not an illness by itself.  A fever can be a sign that you are ill, but most fevers are not caused by a serious problem. You may have a fever with a minor illness, such as a cold. But sometimes a very serious infection may cause little or no fever. It is important to look at other symptoms, other conditions you have, and how you feel in general. In children, notice how they act and see what symptoms they complain of. What is a normal body temperature? A normal body temperature is about 98. 6ºF. Some people have a normal temperature that is a little higher or a little lower than this. Your temperature may be a little lower in the morning than it is later in the day. It may go up during hot weather or when you exercise, wear heavy clothes, or take a hot bath. Your temperature may also be different depending on how you take it. A temperature taken in the mouth (oral) or under the arm may be a little lower than your core temperature (rectal). What is a fever temperature? A core temperature of 100.4°F or above is considered a fever. What can cause a fever? A fever may be caused by:  · Infections. This is the most common cause of a fever. Examples of infections that can cause a fever include the flu, a kidney infection, or pneumonia. · Some medicines. · Severe trauma or injury, such as a heart attack, stroke, heatstroke, or burns. · Other medical conditions, such as arthritis and some cancers. How can you treat a fever at home? · Ask your doctor if you can take an over-the-counter pain medicine, such as acetaminophen (Tylenol), ibuprofen (Advil, Motrin), or naproxen (Aleve). Be safe with medicines. Read and follow all instructions on the label. · To prevent dehydration, drink plenty of fluids. Choose water and other caffeine-free clear liquids until you feel better. If you have kidney, heart, or liver disease and have to limit fluids, talk with your doctor before you increase the amount of fluids you drink. Follow-up care is a key part of your treatment and safety.  Be sure to make and go to all appointments, and call your doctor if you are having problems. It's also a good idea to know your test results and keep a list of the medicines you take. Where can you learn more? Go to http://shruthi-ashley.info/. Enter P934 in the search box to learn more about \"Learning About Fever. \"  Current as of: March 20, 2017  Content Version: 11.4  © 9533-0631 [employer]it. Care instructions adapted under license by [employer] (which disclaims liability or warranty for this information). If you have questions about a medical condition or this instruction, always ask your healthcare professional. [place] [address] any warranty or liability for your use of this information.         The patient verbalizes understanding and agrees with the plan of care        Patient has the advanced directives booklet to review

## 2018-03-28 ENCOUNTER — OFFICE VISIT (OUTPATIENT)
Dept: FAMILY MEDICINE CLINIC | Age: 53
End: 2018-03-28

## 2018-03-28 VITALS
BODY MASS INDEX: 34.62 KG/M2 | HEART RATE: 92 BPM | OXYGEN SATURATION: 96 % | HEIGHT: 63 IN | TEMPERATURE: 98.2 F | WEIGHT: 195.4 LBS | SYSTOLIC BLOOD PRESSURE: 126 MMHG | DIASTOLIC BLOOD PRESSURE: 88 MMHG | RESPIRATION RATE: 17 BRPM

## 2018-03-28 DIAGNOSIS — R73.03 PREDIABETES: ICD-10-CM

## 2018-03-28 DIAGNOSIS — I10 ESSENTIAL HYPERTENSION WITH GOAL BLOOD PRESSURE LESS THAN 130/80: ICD-10-CM

## 2018-03-28 DIAGNOSIS — E78.5 HYPERLIPIDEMIA WITH TARGET LDL LESS THAN 100: ICD-10-CM

## 2018-03-28 DIAGNOSIS — F41.9 ANXIETY: Primary | ICD-10-CM

## 2018-03-28 RX ORDER — ESCITALOPRAM OXALATE 10 MG/1
10 TABLET ORAL DAILY
Qty: 30 TAB | Refills: 1 | Status: SHIPPED | OUTPATIENT
Start: 2018-03-28 | End: 2018-05-20 | Stop reason: SDUPTHER

## 2018-03-28 NOTE — PATIENT INSTRUCTIONS
DASH Diet: Care Instructions  Your Care Instructions    The DASH diet is an eating plan that can help lower your blood pressure. DASH stands for Dietary Approaches to Stop Hypertension. Hypertension is high blood pressure. The DASH diet focuses on eating foods that are high in calcium, potassium, and magnesium. These nutrients can lower blood pressure. The foods that are highest in these nutrients are fruits, vegetables, low-fat dairy products, nuts, seeds, and legumes. But taking calcium, potassium, and magnesium supplements instead of eating foods that are high in those nutrients does not have the same effect. The DASH diet also includes whole grains, fish, and poultry. The DASH diet is one of several lifestyle changes your doctor may recommend to lower your high blood pressure. Your doctor may also want you to decrease the amount of sodium in your diet. Lowering sodium while following the DASH diet can lower blood pressure even further than just the DASH diet alone. Follow-up care is a key part of your treatment and safety. Be sure to make and go to all appointments, and call your doctor if you are having problems. It's also a good idea to know your test results and keep a list of the medicines you take. How can you care for yourself at home? Following the DASH diet  · Eat 4 to 5 servings of fruit each day. A serving is 1 medium-sized piece of fruit, ½ cup chopped or canned fruit, 1/4 cup dried fruit, or 4 ounces (½ cup) of fruit juice. Choose fruit more often than fruit juice. · Eat 4 to 5 servings of vegetables each day. A serving is 1 cup of lettuce or raw leafy vegetables, ½ cup of chopped or cooked vegetables, or 4 ounces (½ cup) of vegetable juice. Choose vegetables more often than vegetable juice. · Get 2 to 3 servings of low-fat and fat-free dairy each day. A serving is 8 ounces of milk, 1 cup of yogurt, or 1 ½ ounces of cheese. · Eat 6 to 8 servings of grains each day.  A serving is 1 slice of bread, 1 ounce of dry cereal, or ½ cup of cooked rice, pasta, or cooked cereal. Try to choose whole-grain products as much as possible. · Limit lean meat, poultry, and fish to 2 servings each day. A serving is 3 ounces, about the size of a deck of cards. · Eat 4 to 5 servings of nuts, seeds, and legumes (cooked dried beans, lentils, and split peas) each week. A serving is 1/3 cup of nuts, 2 tablespoons of seeds, or ½ cup of cooked beans or peas. · Limit fats and oils to 2 to 3 servings each day. A serving is 1 teaspoon of vegetable oil or 2 tablespoons of salad dressing. · Limit sweets and added sugars to 5 servings or less a week. A serving is 1 tablespoon jelly or jam, ½ cup sorbet, or 1 cup of lemonade. · Eat less than 2,300 milligrams (mg) of sodium a day. If you limit your sodium to 1,500 mg a day, you can lower your blood pressure even more. Tips for success  · Start small. Do not try to make dramatic changes to your diet all at once. You might feel that you are missing out on your favorite foods and then be more likely to not follow the plan. Make small changes, and stick with them. Once those changes become habit, add a few more changes. · Try some of the following:  ¨ Make it a goal to eat a fruit or vegetable at every meal and at snacks. This will make it easy to get the recommended amount of fruits and vegetables each day. ¨ Try yogurt topped with fruit and nuts for a snack or healthy dessert. ¨ Add lettuce, tomato, cucumber, and onion to sandwiches. ¨ Combine a ready-made pizza crust with low-fat mozzarella cheese and lots of vegetable toppings. Try using tomatoes, squash, spinach, broccoli, carrots, cauliflower, and onions. ¨ Have a variety of cut-up vegetables with a low-fat dip as an appetizer instead of chips and dip. ¨ Sprinkle sunflower seeds or chopped almonds over salads. Or try adding chopped walnuts or almonds to cooked vegetables.   ¨ Try some vegetarian meals using beans and peas. Add garbanzo or kidney beans to salads. Make burritos and tacos with mashed kingsley beans or black beans. Where can you learn more? Go to http://shruthi-ashley.info/. Enter H989 in the search box to learn more about \"DASH Diet: Care Instructions. \"  Current as of: September 21, 2016  Content Version: 11.4  © 4167-9011 [employer]. Care instructions adapted under license by [employer] (which disclaims liability or warranty for this information). If you have questions about a medical condition or this instruction, always ask your healthcare professional. [place] [address] any warranty or liability for your use of this information. Anxiety Disorder: Care Instructions  Your Care Instructions    Anxiety is a normal reaction to stress. Difficult situations can cause you to have symptoms such as sweaty palms and a nervous feeling. In an anxiety disorder, the symptoms are far more severe. Constant worry, muscle tension, trouble sleeping, nausea and diarrhea, and other symptoms can make normal daily activities difficult or impossible. These symptoms may occur for no reason, and they can affect your work, school, or social life. Medicines, counseling, and self-care can all help. Follow-up care is a key part of your treatment and safety. Be sure to make and go to all appointments, and call your doctor if you are having problems. It's also a good idea to know your test results and keep a list of the medicines you take. How can you care for yourself at home? · Take medicines exactly as directed. Call your doctor if you think you are having a problem with your medicine. · Go to your counseling sessions and follow-up appointments. · Recognize and accept your anxiety. Then, when you are in a situation that makes you anxious, say to yourself, \"This is not an emergency. I feel uncomfortable, but I am not in danger.  I can keep going even if I feel anxious. \"  · Be kind to your body:  ¨ Relieve tension with exercise or a massage. ¨ Get enough rest.  ¨ Avoid alcohol, caffeine, nicotine, and illegal drugs. They can increase your anxiety level and cause sleep problems. ¨ Learn and do relaxation techniques. See below for more about these techniques. · Engage your mind. Get out and do something you enjoy. Go to a funny movie, or take a walk or hike. Plan your day. Having too much or too little to do can make you anxious. · Keep a record of your symptoms. Discuss your fears with a good friend or family member, or join a support group for people with similar problems. Talking to others sometimes relieves stress. · Get involved in social groups, or volunteer to help others. Being alone sometimes makes things seem worse than they are. · Get at least 30 minutes of exercise on most days of the week to relieve stress. Walking is a good choice. You also may want to do other activities, such as running, swimming, cycling, or playing tennis or team sports. Relaxation techniques  Do relaxation exercises 10 to 20 minutes a day. You can play soothing, relaxing music while you do them, if you wish. · Tell others in your house that you are going to do your relaxation exercises. Ask them not to disturb you. · Find a comfortable place, away from all distractions and noise. · Lie down on your back, or sit with your back straight. · Focus on your breathing. Make it slow and steady. · Breathe in through your nose. Breathe out through either your nose or mouth. · Breathe deeply, filling up the area between your navel and your rib cage. Breathe so that your belly goes up and down. · Do not hold your breath. · Breathe like this for 5 to 10 minutes. Notice the feeling of calmness throughout your whole body. As you continue to breathe slowly and deeply, relax by doing the following for another 5 to 10 minutes:  · Tighten and relax each muscle group in your body.  You can begin at your toes and work your way up to your head. · Imagine your muscle groups relaxing and becoming heavy. · Empty your mind of all thoughts. · Let yourself relax more and more deeply. · Become aware of the state of calmness that surrounds you. · When your relaxation time is over, you can bring yourself back to alertness by moving your fingers and toes and then your hands and feet and then stretching and moving your entire body. Sometimes people fall asleep during relaxation, but they usually wake up shortly afterward. · Always give yourself time to return to full alertness before you drive a car or do anything that might cause an accident if you are not fully alert. Never play a relaxation tape while you drive a car. When should you call for help? Call 911 anytime you think you may need emergency care. For example, call if:  ? · You feel you cannot stop from hurting yourself or someone else. ? Keep the numbers for these national suicide hotlines: 5-853-667-TALK (2-295.721.7580) and 0-128-QHEKBRS (5-145.352.5778). If you or someone you know talks about suicide or feeling hopeless, get help right away. ? Watch closely for changes in your health, and be sure to contact your doctor if:  ? · You have anxiety or fear that affects your life. ? · You have symptoms of anxiety that are new or different from those you had before. Where can you learn more? Go to http://shruthi-ashley.info/. Enter P754 in the search box to learn more about \"Anxiety Disorder: Care Instructions. \"  Current as of: May 12, 2017  Content Version: 11.4  © 1343-6367 [employer]. Care instructions adapted under license by [employer] (which disclaims liability or warranty for this information).  If you have questions about a medical condition or this instruction, always ask your healthcare professional. [place] [address] any warranty or liability for your use of this information. Starting a Weight Loss Plan: Care Instructions  Your Care Instructions    If you are thinking about losing weight, it can be hard to know where to start. Your doctor can help you set up a weight loss plan that best meets your needs. You may want to take a class on nutrition or exercise, or join a weight loss support group. If you have questions about how to make changes to your eating or exercise habits, ask your doctor about seeing a registered dietitian or an exercise specialist.  It can be a big challenge to lose weight. But you do not have to make huge changes at once. Make small changes, and stick with them. When those changes become habit, add a few more changes. If you do not think you are ready to make changes right now, try to pick a date in the future. Make an appointment to see your doctor to discuss whether the time is right for you to start a plan. Follow-up care is a key part of your treatment and safety. Be sure to make and go to all appointments, and call your doctor if you are having problems. It's also a good idea to know your test results and keep a list of the medicines you take. How can you care for yourself at home? · Set realistic goals. Many people expect to lose much more weight than is likely. A weight loss of 5% to 10% of your body weight may be enough to improve your health. · Get family and friends involved to provide support. Talk to them about why you are trying to lose weight, and ask them to help. They can help by participating in exercise and having meals with you, even if they may be eating something different. · Find what works best for you. If you do not have time or do not like to cook, a program that offers meal replacement bars or shakes may be better for you.  Or if you like to prepare meals, finding a plan that includes daily menus and recipes may be best.  · Ask your doctor about other health professionals who can help you achieve your weight loss goals.  ¨ A dietitian can help you make healthy changes in your diet. ¨ An exercise specialist or  can help you develop a safe and effective exercise program.  ¨ A counselor or psychiatrist can help you cope with issues such as depression, anxiety, or family problems that can make it hard to focus on weight loss. · Consider joining a support group for people who are trying to lose weight. Your doctor can suggest groups in your area. Where can you learn more? Go to http://shruthi-ashley.info/. Enter I065 in the search box to learn more about \"Starting a Weight Loss Plan: Care Instructions. \"  Current as of: October 13, 2016  Content Version: 11.4  © 1859-4168 [employer]. Care instructions adapted under license by [employer] (which disclaims liability or warranty for this information). If you have questions about a medical condition or this instruction, always ask your healthcare professional. [address] any warranty or liability for your use of this information.

## 2018-03-28 NOTE — PROGRESS NOTES
1. Have you been to the ER, urgent care clinic since your last visit? Hospitalized since your last visit? No    2. Have you seen or consulted any other health care providers outside of the [address] [place] [address] since your last visit? Include any pap smears or colon screening.  No     Chief Complaint   Patient presents with    Anxiety

## 2018-03-28 NOTE — MR AVS SNAPSHOT
[address] [place] [id number] 
[phone] Patient: Bharath Elder 
MRN: [id number] :1965 Visit Information Date & Time Provider Department Dept. Phone Encounter #  
 3/28/2018  8:30 AM Miguel Soriano  [facility] [phone] [id number] Follow-up Instructions Return in about 4 weeks (around 2018). Upcoming Health Maintenance Date Due FOBT Q 1 YEAR AGE 50-75 2016 BREAST CANCER SCRN MAMMOGRAM 2018 DTaP/Tdap/Td series (2 - Td) 2026 Allergies as of 3/28/2018  Review Complete On: 3/28/2018 By: Betty Weldon LPN Severity Noted Reaction Type Reactions Pcn [Penicillins]  2015    Rash Pcn [Penicillins]  2016    Rash, Swelling Percocet [Oxycodone-acetaminophen]  2016    Other (comments)  
 hypotension Sulfa (Sulfonamide Antibiotics)  2015    Hives Sulfa (Sulfonamide Antibiotics)  2016    Rash, Swelling Current Immunizations  Reviewed on 2016 Name Date Influenza Vaccine (Quad) PF 2016 Tdap 2016 Not reviewed this visit You Were Diagnosed With   
  
 Codes Comments Anxiety    -  Primary ICD-10-CM: F41.9 ICD-9-CM: 300.00 Prediabetes     ICD-10-CM: R73.03 
ICD-9-CM: 790.29 Essential hypertension with goal blood pressure less than 130/80     ICD-10-CM: I10 
ICD-9-CM: 401.9 BMI 35.0-35.9,adult     ICD-10-CM: X84.52 ICD-9-CM: V85.35 Hyperlipidemia with target LDL less than 100     ICD-10-CM: E78.5 ICD-9-CM: 272.4 Vitals BP Pulse Temp Resp Height(growth percentile) Weight(growth percentile) 126/88 92 98.2 °F (36.8 °C) (Oral) 17 5' 2.5\" (1.588 m) 195 lb 6.4 oz (88.6 kg) SpO2 BMI OB Status Smoking Status 96% 35.17 kg/m2 Hysterectomy Never Smoker Vitals History BMI and BSA Data Body Mass Index Body Surface Area  
 35.17 kg/m 2 1.98 m 2 Preferred Pharmacy Pharmacy Name Phone [id number], [address] [place] [address] [phone] Your Updated Medication List  
  
   
This list is accurate as of 3/28/18  8:45 AM.  Always use your most recent med list.  
  
  
  
  
 albuterol 90 mcg/actuation inhaler Commonly known as:  PROVENTIL HFA, VENTOLIN HFA, PROAIR HFA Take 2 Puffs by inhalation every four (4) hours as needed for Wheezing. cetirizine 10 mg tablet Commonly known as:  ZYRTEC Take 1 Tab by mouth daily. escitalopram oxalate 10 mg tablet Commonly known as:  Rick [place] Take 1 Tab by mouth daily. fluticasone 50 mcg/actuation nasal spray Commonly known as:  Aubery Cables 2 Sprays by Both Nostrils route daily. hydroCHLOROthiazide 25 mg tablet Commonly known as:  HYDRODIURIL Take 1 Tab by mouth daily. inhalational spacing device For use with inhaler Prescriptions Sent to Pharmacy Refills  
 escitalopram oxalate (LEXAPRO) 10 mg tablet 1 Sig: Take 1 Tab by mouth daily. Class: Normal  
 Pharmacy: [facility], [address] [place] [address] Ph #: [phone] Route: Oral  
  
We Performed the Following HEMOGLOBIN A1C WITH EAG [49321 CPT(R)] LIPID PANEL [72771 CPT(R)] METABOLIC PANEL, COMPREHENSIVE [53116 CPT(R)] TSH 3RD GENERATION [11651 CPT(R)] Follow-up Instructions Return in about 4 weeks (around 4/25/2018). Patient Instructions DASH Diet: Care Instructions Your Care Instructions The DASH diet is an eating plan that can help lower your blood pressure. DASH stands for Dietary Approaches to Stop Hypertension. Hypertension is high blood pressure. The DASH diet focuses on eating foods that are high in calcium, potassium, and magnesium. These nutrients can lower blood pressure.  The foods that are highest in these nutrients are fruits, vegetables, low-fat dairy products, nuts, seeds, and legumes. But taking calcium, potassium, and magnesium supplements instead of eating foods that are high in those nutrients does not have the same effect. The DASH diet also includes whole grains, fish, and poultry. The DASH diet is one of several lifestyle changes your doctor may recommend to lower your high blood pressure. Your doctor may also want you to decrease the amount of sodium in your diet. Lowering sodium while following the DASH diet can lower blood pressure even further than just the DASH diet alone. Follow-up care is a key part of your treatment and safety. Be sure to make and go to all appointments, and call your doctor if you are having problems. It's also a good idea to know your test results and keep a list of the medicines you take. How can you care for yourself at home? Following the DASH diet · Eat 4 to 5 servings of fruit each day. A serving is 1 medium-sized piece of fruit, ½ cup chopped or canned fruit, 1/4 cup dried fruit, or 4 ounces (½ cup) of fruit juice. Choose fruit more often than fruit juice. · Eat 4 to 5 servings of vegetables each day. A serving is 1 cup of lettuce or raw leafy vegetables, ½ cup of chopped or cooked vegetables, or 4 ounces (½ cup) of vegetable juice. Choose vegetables more often than vegetable juice. · Get 2 to 3 servings of low-fat and fat-free dairy each day. A serving is 8 ounces of milk, 1 cup of yogurt, or 1 ½ ounces of cheese. · Eat 6 to 8 servings of grains each day. A serving is 1 slice of bread, 1 ounce of dry cereal, or ½ cup of cooked rice, pasta, or cooked cereal. Try to choose whole-grain products as much as possible. · Limit lean meat, poultry, and fish to 2 servings each day. A serving is 3 ounces, about the size of a deck of cards.  
· Eat 4 to 5 servings of nuts, seeds, and legumes (cooked dried beans, lentils, and split peas) each week. A serving is 1/3 cup of nuts, 2 tablespoons of seeds, or ½ cup of cooked beans or peas. · Limit fats and oils to 2 to 3 servings each day. A serving is 1 teaspoon of vegetable oil or 2 tablespoons of salad dressing. · Limit sweets and added sugars to 5 servings or less a week. A serving is 1 tablespoon jelly or jam, ½ cup sorbet, or 1 cup of lemonade. · Eat less than 2,300 milligrams (mg) of sodium a day. If you limit your sodium to 1,500 mg a day, you can lower your blood pressure even more. Tips for success · Start small. Do not try to make dramatic changes to your diet all at once. You might feel that you are missing out on your favorite foods and then be more likely to not follow the plan. Make small changes, and stick with them. Once those changes become habit, add a few more changes. · Try some of the following: ¨ Make it a goal to eat a fruit or vegetable at every meal and at snacks. This will make it easy to get the recommended amount of fruits and vegetables each day. ¨ Try yogurt topped with fruit and nuts for a snack or healthy dessert. ¨ Add lettuce, tomato, cucumber, and onion to sandwiches. ¨ Combine a ready-made pizza crust with low-fat mozzarella cheese and lots of vegetable toppings. Try using tomatoes, squash, spinach, broccoli, carrots, cauliflower, and onions. ¨ Have a variety of cut-up vegetables with a low-fat dip as an appetizer instead of chips and dip. ¨ Sprinkle sunflower seeds or chopped almonds over salads. Or try adding chopped walnuts or almonds to cooked vegetables. ¨ Try some vegetarian meals using beans and peas. Add garbanzo or kidney beans to salads. Make burritos and tacos with mashed kingsley beans or black beans. Where can you learn more? Go to http://shruthi-ashley.info/. Enter H033 in the search box to learn more about \"DASH Diet: Care Instructions. \" Current as of: September 21, 2016 Content Version: 11.4 © 8373-2125 [employer]. Care instructions adapted under license by [employer] (which disclaims liability or warranty for this information). If you have questions about a medical condition or this instruction, always ask your healthcare professional. [address] any warranty or liability for your use of this information. Anxiety Disorder: Care Instructions Your Care Instructions Anxiety is a normal reaction to stress. Difficult situations can cause you to have symptoms such as sweaty palms and a nervous feeling. In an anxiety disorder, the symptoms are far more severe. Constant worry, muscle tension, trouble sleeping, nausea and diarrhea, and other symptoms can make normal daily activities difficult or impossible. These symptoms may occur for no reason, and they can affect your work, school, or social life. Medicines, counseling, and self-care can all help. Follow-up care is a key part of your treatment and safety. Be sure to make and go to all appointments, and call your doctor if you are having problems. It's also a good idea to know your test results and keep a list of the medicines you take. How can you care for yourself at home? · Take medicines exactly as directed. Call your doctor if you think you are having a problem with your medicine. · Go to your counseling sessions and follow-up appointments. · Recognize and accept your anxiety. Then, when you are in a situation that makes you anxious, say to yourself, \"This is not an emergency. I feel uncomfortable, but I am not in danger. I can keep going even if I feel anxious. \" · Be kind to your body: ¨ Relieve tension with exercise or a massage. ¨ Get enough rest. 
¨ Avoid alcohol, caffeine, nicotine, and illegal drugs. They can increase your anxiety level and cause sleep problems. ¨ Learn and do relaxation techniques. See below for more about these techniques. · Engage your mind. Get out and do something you enjoy. Go to a funny movie, or take a walk or hike. Plan your day. Having too much or too little to do can make you anxious. · Keep a record of your symptoms. Discuss your fears with a good friend or family member, or join a support group for people with similar problems. Talking to others sometimes relieves stress. · Get involved in social groups, or volunteer to help others. Being alone sometimes makes things seem worse than they are. · Get at least 30 minutes of exercise on most days of the week to relieve stress. Walking is a good choice. You also may want to do other activities, such as running, swimming, cycling, or playing tennis or team sports. Relaxation techniques Do relaxation exercises 10 to 20 minutes a day. You can play soothing, relaxing music while you do them, if you wish. · Tell others in your house that you are going to do your relaxation exercises. Ask them not to disturb you. · Find a comfortable place, away from all distractions and noise. · Lie down on your back, or sit with your back straight. · Focus on your breathing. Make it slow and steady. · Breathe in through your nose. Breathe out through either your nose or mouth. · Breathe deeply, filling up the area between your navel and your rib cage. Breathe so that your belly goes up and down. · Do not hold your breath. · Breathe like this for 5 to 10 minutes. Notice the feeling of calmness throughout your whole body. As you continue to breathe slowly and deeply, relax by doing the following for another 5 to 10 minutes: · Tighten and relax each muscle group in your body. You can begin at your toes and work your way up to your head. · Imagine your muscle groups relaxing and becoming heavy. · Empty your mind of all thoughts. · Let yourself relax more and more deeply. · Become aware of the state of calmness that surrounds you. · When your relaxation time is over, you can bring yourself back to alertness by moving your fingers and toes and then your hands and feet and then stretching and moving your entire body. Sometimes people fall asleep during relaxation, but they usually wake up shortly afterward. · Always give yourself time to return to full alertness before you drive a car or do anything that might cause an accident if you are not fully alert. Never play a relaxation tape while you drive a car. When should you call for help? Call 911 anytime you think you may need emergency care. For example, call if: 
? · You feel you cannot stop from hurting yourself or someone else. ? Keep the numbers for these national suicide hotlines: 9-483-863-TALK (2-608.493.4749) and 5-268-ZURQMWW (6-597.900.7525). If you or someone you know talks about suicide or feeling hopeless, get help right away. ? Watch closely for changes in your health, and be sure to contact your doctor if: 
? · You have anxiety or fear that affects your life. ? · You have symptoms of anxiety that are new or different from those you had before. Where can you learn more? Go to http://shruthi-ashley.info/. Enter P754 in the search box to learn more about \"Anxiety Disorder: Care Instructions. \" Current as of: May 12, 2017 Content Version: 11.4 © 9978-5382 [employer]. Care instructions adapted under license by [employer] (which disclaims liability or warranty for this information). If you have questions about a medical condition or this instruction, always ask your healthcare professional. [address] any warranty or liability for your use of this information. Starting a Weight Loss Plan: Care Instructions Your Care Instructions If you are thinking about losing weight, it can be hard to know where to start.  Your doctor can help you set up a weight loss plan that best meets your needs. You may want to take a class on nutrition or exercise, or join a weight loss support group. If you have questions about how to make changes to your eating or exercise habits, ask your doctor about seeing a registered dietitian or an exercise specialist. 
It can be a big challenge to lose weight. But you do not have to make huge changes at once. Make small changes, and stick with them. When those changes become habit, add a few more changes. If you do not think you are ready to make changes right now, try to pick a date in the future. Make an appointment to see your doctor to discuss whether the time is right for you to start a plan. Follow-up care is a key part of your treatment and safety. Be sure to make and go to all appointments, and call your doctor if you are having problems. It's also a good idea to know your test results and keep a list of the medicines you take. How can you care for yourself at home? · Set realistic goals. Many people expect to lose much more weight than is likely. A weight loss of 5% to 10% of your body weight may be enough to improve your health. · Get family and friends involved to provide support. Talk to them about why you are trying to lose weight, and ask them to help. They can help by participating in exercise and having meals with you, even if they may be eating something different. · Find what works best for you. If you do not have time or do not like to cook, a program that offers meal replacement bars or shakes may be better for you. Or if you like to prepare meals, finding a plan that includes daily menus and recipes may be best. 
· Ask your doctor about other health professionals who can help you achieve your weight loss goals. ¨ A dietitian can help you make healthy changes in your diet.  
¨ An exercise specialist or  can help you develop a safe and effective exercise program. 
 ¨ A counselor or psychiatrist can help you cope with issues such as depression, anxiety, or family problems that can make it hard to focus on weight loss. · Consider joining a support group for people who are trying to lose weight. Your doctor can suggest groups in your area. Where can you learn more? Go to http://shruthi-ashley.info/. Enter W817 in the search box to learn more about \"Starting a Weight Loss Plan: Care Instructions. \" Current as of: October 13, 2016 Content Version: 11.4 © 6514-3894 [employer]. Care instructions adapted under license by [employer] (which disclaims liability or warranty for this information). If you have questions about a medical condition or this instruction, always ask your healthcare professional. [address] any warranty or liability for your use of this information. Introducing [facility]! Dear Quinten Perez: 
Thank you for requesting a Geddit account. Our records indicate that you already have an active Geddit account. You can access your account anytime at [email or web address] Did you know that you can access your hospital and ER discharge instructions at any time in Geddit? You can also review all of your test results from your hospital stay or ER visit. Additional Information If you have questions, please visit the Frequently Asked Questions section of the Geddit website at [email or web address] Remember, Geddit is NOT to be used for urgent needs. For medical emergencies, dial 911. Now available from your iPhone and Android! Please provide this summary of care documentation to your next provider. Your primary care clinician is listed as Tiffanie Monet. If you have any questions after today's visit, please call [phone].

## 2018-03-28 NOTE — LETTER
4/9/2018 8:55 AM 
 
Ms. Fanny Clemons 
[address] [address] Dear Fanny Clemons: 
 
Please find your most recent results below. Resulted Orders HEMOGLOBIN A1C WITH EAG Result Value Ref Range Hemoglobin A1c 5.7 (H) 4.8 - 5.6 % Comment:  
            Pre-diabetes: 5.7 - 6.4 Diabetes: >6.4 Glycemic control for adults with diabetes: <7.0 Estimated average glucose 117 mg/dL Narrative Performed at:  [address]  [id number] : Jourdan Alberto MD, Phone:  [phone] METABOLIC PANEL, COMPREHENSIVE Result Value Ref Range Glucose 109 (H) 65 - 99 mg/dL BUN 12 6 - 24 mg/dL Creatinine 0.81 0.57 - 1.00 mg/dL GFR est non-AA 84 >59 mL/min/1.73 GFR est AA 97 >59 mL/min/1.73  
 BUN/Creatinine ratio 15 9 - 23 Sodium 141 134 - 144 mmol/L Potassium 4.0 3.5 - 5.2 mmol/L Chloride 98 96 - 106 mmol/L  
 CO2 26 18 - 29 mmol/L Calcium 9.6 8.7 - 10.2 mg/dL Protein, total 7.4 6.0 - 8.5 g/dL Albumin 4.7 3.5 - 5.5 g/dL GLOBULIN, TOTAL 2.7 1.5 - 4.5 g/dL A-G Ratio 1.7 1.2 - 2.2 Bilirubin, total 0.4 0.0 - 1.2 mg/dL Alk. phosphatase 66 39 - 117 IU/L  
 AST (SGOT) 19 0 - 40 IU/L  
 ALT (SGPT) 18 0 - 32 IU/L Narrative Performed at:  [address]  [id number] : Jourdan Alberto MD, Phone:  [phone] LIPID PANEL Result Value Ref Range Cholesterol, total 250 (H) 100 - 199 mg/dL Triglyceride 166 (H) 0 - 149 mg/dL HDL Cholesterol 57 >39 mg/dL VLDL, calculated 33 5 - 40 mg/dL LDL, calculated 160 (H) 0 - 99 mg/dL Narrative Performed at:  [address]  [id number] : Jourdan Alberto MD, Phone:  [phone] TSH 3RD GENERATION Result Value Ref Range TSH 3.640 0.450 - 4.500 uIU/mL Narrative Performed at:  [address]  [id number] : Veronica Lee MD, Phone:  [phone] CVD REPORT Result Value Ref Range INTERPRETATION Note Comment:  
   Medical Director's Note: Patient Last Name has been 
corrected on 3/29/2018, was SHORT and now is ASHLEY. Please 
review this report in its entirety, since changes to patient 
demographics may affect result interpretation(s) and/or 
treatment/follow-up suggestions. Supplemental report is available. Narrative Performed at:  [address] [place] [address]  [id number] : Kiesha Segura PhD, Phone:  [phone] RECOMMENDATIONS: 
Cholesterol is too high, worse than last year. Based on your current numbers and other risk factors, you are right at the edge of needing to start medication. I'd recommend trying 3 months of intensive lifestyle changes to include the following: TLC Diet: Saturated fat <7% of calories, cholesterol <200 mg/day Consider increased viscous (soluble) fiber (10-25 g/day) and plant stanols/sterols (2g/day) as therapeutic options to enhance LDL lowering;  Weight management; Increased physical activity. Recheck fasting cholesterol in 3 months. Please call me if you have any questions: [phone] Sincerely, 
 
 
Hussein Arevalo NP

## 2018-03-28 NOTE — PROGRESS NOTES
Subjective:     Zainab Gates is a 47 y.o. female who presents for follow up of prediabetes, hypertension and obesity. Diet and Lifestyle: generally follows a low fat low cholesterol diet, generally follows a low sodium diet, does not rigorously follow a diabetic diet, exercises sporadically, nonsmoker  Home BP Monitoring: is not measured at home    Cardiovascular ROS: taking medications as instructed, no medication side effects noted, no TIA's, no chest pain on exertion, no dyspnea on exertion, no swelling of ankles, no orthostatic dizziness or lightheadedness. New concerns: c/o increasing anxiety over the past 2 weeks, with worry, feeling overwhelmed. Has had some mild panic attacks with chest discomfort, palpitations, and shortness of breath. One was while driving. She has been traveling to [place] every weekend to care for her mother who has dementia. She is also looking for a new house. She has never had any issue with anxiety in the past. Denies thoughts of harming self or others.      Patient Active Problem List   Diagnosis Code    Essential hypertension with goal blood pressure less than 130/80 I10    Prediabetes R73.03    Obesity, Class I, BMI 30-34.9 E66.9     Allergies   Allergen Reactions    Pcn [Penicillins] Rash    Pcn [Penicillins] Rash and Swelling    Percocet [Oxycodone-Acetaminophen] Other (comments)     hypotension    Sulfa (Sulfonamide Antibiotics) Hives    Sulfa (Sulfonamide Antibiotics) Rash and Swelling     Past Medical History:   Diagnosis Date    Fibroid tumor      Past Surgical History:   Procedure Laterality Date    HX GYN      removal of fibroid tumors    HX HYSTERECTOMY      HX MENISCUS REPAIR      9/2015    HX PARTIAL HYSTERECTOMY      HX TUBAL LIGATION      HX TUBAL LIGATION      1990     Family History   Problem Relation Age of Onset   [facility]rence Chappell Cancer Mother     Dementia Mother     No Known Problems Father      Social History   Substance Use Topics    Smoking status: Never Smoker    Smokeless tobacco: Never Used    Alcohol use No        Lab Results  Component Value Date/Time   Hemoglobin A1c 6.2 (H) 07/07/2016 09:32 AM   Glucose 102 (H) 06/09/2016 10:02 AM   LDL, calculated 151 (H) 06/09/2016 10:02 AM   Creatinine 0.71 06/09/2016 10:02 AM      Lab Results  Component Value Date/Time   Cholesterol, total 242 (H) 06/09/2016 10:02 AM   HDL Cholesterol 52 06/09/2016 10:02 AM   LDL, calculated 151 (H) 06/09/2016 10:02 AM   Triglyceride 197 (H) 06/09/2016 10:02 AM     Lab Results  Component Value Date/Time   ALT (SGPT) 10 06/09/2016 10:02 AM   AST (SGOT) 12 06/09/2016 10:02 AM   Alk. phosphatase 84 06/09/2016 10:02 AM   Bilirubin, total 0.4 06/09/2016 10:02 AM   Albumin 4.2 06/09/2016 10:02 AM   Protein, total 7.1 06/09/2016 10:02 AM   PLATELET 890 35/76/8900 10:02 AM       Lab Results  Component Value Date/Time   GFR est non- 06/09/2016 10:02 AM   GFR est  06/09/2016 10:02 AM   Creatinine 0.71 06/09/2016 10:02 AM   BUN 9 06/09/2016 10:02 AM   Sodium 141 06/09/2016 10:02 AM   Potassium 4.4 06/09/2016 10:02 AM   Chloride 102 06/09/2016 10:02 AM   CO2 25 06/09/2016 10:02 AM        Review of Systems, additional:  A comprehensive review of systems was negative except for that written in the HPI. Objective:     Visit Vitals    /88    Pulse 92    Temp 98.2 °F (36.8 °C) (Oral)    Resp 17    Ht 5' 2.5\" (1.588 m)    Wt 195 lb 6.4 oz (88.6 kg)    SpO2 96%    BMI 35.17 kg/m2     Appearance: alert, well appearing, and in no distress, oriented to person, place, and time, overweight and well hydrated. General exam: CVS exam BP noted to be well controlled today in office, S1, S2 normal, no gallop, no murmur, chest clear, no JVD, no HSM, no edema, peripheral vascular exam both carotids normal upstroke without bruits, neurological exam alert, oriented, normal speech, no focal findings or movement disorder noted.        Assessment/Plan:     hypertension well controlled. reviewed diet, exercise and weight control  copy of written low fat low cholesterol diet provided and reviewed  cardiovascular risk and specific lipid/LDL goals reviewed  reviewed medications and side effects in detail  reviewed potential future medication changes and side effects  specific diabetic recommendations: low cholesterol diet, weight control and daily exercise discussed. Diagnoses and all orders for this visit:    1. Anxiety  Add rx  Consider counseling to help with coping with mother's dementia  -     escitalopram oxalate (LEXAPRO) 10 mg tablet; Take 1 Tab by mouth daily. 2. Prediabetes  Reviewed diet and exercise recommendations  Weight loss  Check A1C  -     HEMOGLOBIN A1C WITH EAG    3. Essential hypertension with goal blood pressure less than 130/80  At goal  Continue HCTZ  Weight loss  Daily walking  DASH diet  -     METABOLIC PANEL, COMPREHENSIVE  -     TSH 3RD GENERATION    4. BMI 35.0-35.9,adult  I have reviewed/discussed the above normal BMI with the patient. I have recommended the following interventions: dietary management education, guidance, and counseling, encourage exercise and monitor weight . .      5. Hyperlipidemia with target LDL less than 100  TLC Diet:  -- Saturated fat <7% of calories, cholesterol <200 mg/day  -- Consider increased viscous (soluble) fiber (10-25 g/day) and plant stanols/sterols  (2g/day) as therapeutic options to enhance LDL lowering  Weight management  Increased physical activity. Will need to consider statin if remains elevated  -     LIPID PANEL    Follow-up Disposition:  Return in about 4 weeks (around 4/25/2018). I have discussed the diagnosis with the patient and the intended plan as seen in the above orders. The patient has received an after-visit summary and questions were answered concerning future plans. Patient conveyed understanding of the plan at the time of the visit.     Alfredo Ozuna NP  03/28/18

## 2018-03-29 LAB
ALBUMIN SERPL-MCNC: 4.7 G/DL (ref 3.5–5.5)
ALBUMIN/GLOB SERPL: 1.7 {RATIO} (ref 1.2–2.2)
ALP SERPL-CCNC: 66 IU/L (ref 39–117)
ALT SERPL-CCNC: 18 IU/L (ref 0–32)
AST SERPL-CCNC: 19 IU/L (ref 0–40)
BILIRUB SERPL-MCNC: 0.4 MG/DL (ref 0–1.2)
BUN SERPL-MCNC: 12 MG/DL (ref 6–24)
BUN/CREAT SERPL: 15 (ref 9–23)
CALCIUM SERPL-MCNC: 9.6 MG/DL (ref 8.7–10.2)
CHLORIDE SERPL-SCNC: 98 MMOL/L (ref 96–106)
CHOLEST SERPL-MCNC: 250 MG/DL (ref 100–199)
CO2 SERPL-SCNC: 26 MMOL/L (ref 18–29)
CREAT SERPL-MCNC: 0.81 MG/DL (ref 0.57–1)
EST. AVERAGE GLUCOSE BLD GHB EST-MCNC: 117 MG/DL
GFR SERPLBLD CREATININE-BSD FMLA CKD-EPI: 84 ML/MIN/1.73
GFR SERPLBLD CREATININE-BSD FMLA CKD-EPI: 97 ML/MIN/1.73
GLOBULIN SER CALC-MCNC: 2.7 G/DL (ref 1.5–4.5)
GLUCOSE SERPL-MCNC: 109 MG/DL (ref 65–99)
HBA1C MFR BLD: 5.7 % (ref 4.8–5.6)
HDLC SERPL-MCNC: 57 MG/DL
INTERPRETATION, 910389: NORMAL
LDLC SERPL CALC-MCNC: 160 MG/DL (ref 0–99)
POTASSIUM SERPL-SCNC: 4 MMOL/L (ref 3.5–5.2)
PROT SERPL-MCNC: 7.4 G/DL (ref 6–8.5)
SODIUM SERPL-SCNC: 141 MMOL/L (ref 134–144)
TRIGL SERPL-MCNC: 166 MG/DL (ref 0–149)
TSH SERPL DL<=0.005 MIU/L-ACNC: 3.64 UIU/ML (ref 0.45–4.5)
VLDLC SERPL CALC-MCNC: 33 MG/DL (ref 5–40)

## 2018-04-04 NOTE — PROGRESS NOTES
A1c has improved, now down to 5.7. This is very close to being back in normal range. Blood chemistry and thyroid are normal.  Cholesterol is too high, has worsened since checked last year. [facility] gt

## 2018-04-04 NOTE — PROGRESS NOTES
Cholesterol is too high, worse than last year. Based on your current numbers and other risk factors, you are right at the edge of needing to start medication. I'd recommend trying 3 months of intensive lifestyle changes to include the following: TLC Diet: Saturated fat <7% of calories, cholesterol <200 mg/day Consider increased viscous (soluble) fiber (10-25 g/day) and plant stanols/sterols (2g/day) as therapeutic options to enhance LDL lowering;  Weight management; Increased physical activity. Recheck fasting cholesterol in 3 months.

## 2018-04-09 NOTE — PROGRESS NOTES
Spoke with patient and advised of lab results. Mailed letter and clinical reference about heart healthy diet. Patient verbalized understanding and had no questions at this time.

## 2018-05-20 DIAGNOSIS — F41.9 ANXIETY: ICD-10-CM

## 2018-05-20 RX ORDER — ESCITALOPRAM OXALATE 10 MG/1
TABLET ORAL
Qty: 30 TAB | Refills: 1 | Status: SHIPPED | OUTPATIENT
Start: 2018-05-20 | End: 2018-11-06

## 2018-06-11 ENCOUNTER — OFFICE VISIT (OUTPATIENT)
Dept: FAMILY MEDICINE CLINIC | Age: 53
End: 2018-06-11

## 2018-06-11 VITALS
BODY MASS INDEX: 33.73 KG/M2 | WEIGHT: 190.4 LBS | OXYGEN SATURATION: 98 % | DIASTOLIC BLOOD PRESSURE: 74 MMHG | HEART RATE: 91 BPM | RESPIRATION RATE: 19 BRPM | TEMPERATURE: 97.5 F | HEIGHT: 63 IN | SYSTOLIC BLOOD PRESSURE: 109 MMHG

## 2018-06-11 DIAGNOSIS — M54.41 ACUTE RIGHT-SIDED LOW BACK PAIN WITH RIGHT-SIDED SCIATICA: Primary | ICD-10-CM

## 2018-06-11 RX ORDER — DICLOFENAC SODIUM 50 MG/1
50 TABLET, DELAYED RELEASE ORAL 2 TIMES DAILY WITH MEALS
Qty: 45 TAB | Refills: 1 | Status: SHIPPED | OUTPATIENT
Start: 2018-06-11 | End: 2018-06-21

## 2018-06-11 RX ORDER — CYCLOBENZAPRINE HCL 5 MG
5 TABLET ORAL
Qty: 20 TAB | Refills: 0 | Status: SHIPPED | OUTPATIENT
Start: 2018-06-11 | End: 2018-11-30 | Stop reason: SDUPTHER

## 2018-06-11 NOTE — PROGRESS NOTES
Subjective:     Audrey Burden is a 47 y.o. female who complains of right low back pain for 1 month with radiation into the right buttock. Precipitating factors: none recalled by the patient. Prior history of back problems: recurrent self limited episodes of low back pain in the past. There is no numbness in the legs. Symptoms occur all day. Alleviating factors identifiable by patient are none. Exacerbating factors identifiable by patient are sitting, bending forwards, riding in the car. Has tried tylenol, advil, heating pad, massage without relief. Patient Active Problem List   Diagnosis Code    Essential hypertension with goal blood pressure less than 130/80 I10    Prediabetes R73.03    Obesity, Class I, BMI 30-34.9 E66.9     Allergies   Allergen Reactions    Pcn [Penicillins] Rash    Pcn [Penicillins] Rash and Swelling    Percocet [Oxycodone-Acetaminophen] Other (comments)     hypotension    Sulfa (Sulfonamide Antibiotics) Hives    Sulfa (Sulfonamide Antibiotics) Rash and Swelling     Past Medical History:   Diagnosis Date    Fibroid tumor      Past Surgical History:   Procedure Laterality Date    HX GYN      removal of fibroid tumors    HX HYSTERECTOMY      HX MENISCUS REPAIR      9/2015    HX PARTIAL HYSTERECTOMY      HX TUBAL LIGATION      HX TUBAL LIGATION      1990     Family History   Problem Relation Age of Onset   [employer] Cancer Mother     Dementia Mother     No Known Problems Father      Social History   Substance Use Topics    Smoking status: Never Smoker    Smokeless tobacco: Never Used    Alcohol use No        Review of Systems  A comprehensive review of systems was negative except for that written in the HPI. Objective:     Visit Vitals    /74    Pulse 91    Temp 97.5 °F (36.4 °C) (Oral)    Resp 19    Ht 5' 2.5\" (1.588 m)    Wt 190 lb 6.4 oz (86.4 kg)    SpO2 98%    BMI 34.27 kg/m2      Patient appears to be in mild to moderate pain, antalgic gait noted. Lumbosacral spine area reveals no  Mass for palpable spasm. Tenderness right lumbar. Tenderness right sacroiliac notch. Painful and reduced LS ROM noted. Straight leg raise is negative bilaterally. DTR's, motor strength and sensation normal.  Peripheral pulses are palpable. X-Ray: not indicated. Assessment/Plan:     lumbar strain    For acute pain, rest, intermittent application of heat (do not sleep on heating pad), analgesics and muscle relaxants are recommended. Discussed longer term treatment plan of prn NSAID's and discussed a home back care exercise program with flexion exercise routine. Proper lifting with avoidance of heavy lifting discussed. Consider Physical Therapy and XRay studies if not improving. Call or return to clinic prn if these symptoms worsen or fail to improve as anticipated. Diagnoses and all orders for this visit:    1. Acute right-sided low back pain with right-sided sciatica  Add rx  Start lumbar exercises in 2 days  -     diclofenac EC (VOLTAREN) 50 mg EC tablet; Take 1 Tab by mouth two (2) times daily (with meals) for 10 days. May use q12 hr prn with food after the first 10 days. -     cyclobenzaprine (FLEXERIL) 5 mg tablet; Take 1 Tab by mouth nightly as needed for Muscle Spasm(s). Follow-up Disposition:  Return in about 2 weeks (around 6/25/2018). I have discussed the diagnosis with the patient and the intended plan as seen in the above orders. The patient has received an after-visit summary and questions were answered concerning future plans. Patient conveyed understanding of the plan at the time of the visit.     Orestes Zhao NP   06/11/18

## 2018-06-11 NOTE — PATIENT INSTRUCTIONS
Low Back Pain: Exercises  Your Care Instructions  Here are some examples of typical rehabilitation exercises for your condition. Start each exercise slowly. Ease off the exercise if you start to have pain. Your doctor or physical therapist will tell you when you can start these exercises and which ones will work best for you. How to do the exercises  Press-up    1. Lie on your stomach, supporting your body with your forearms. 2. Press your elbows down into the floor to raise your upper back. As you do this, relax your stomach muscles and allow your back to arch without using your back muscles. As your press up, do not let your hips or pelvis come off the floor. 3. Hold for 15 to 30 seconds, then relax. 4. Repeat 2 to 4 times. Alternate arm and leg (bird dog) exercise    Do this exercise slowly. Try to keep your body straight at all times, and do not let one hip drop lower than the other. 1. Start on the floor, on your hands and knees. 2. Tighten your belly muscles. 3. Raise one leg off the floor, and hold it straight out behind you. Be careful not to let your hip drop down, because that will twist your trunk. 4. Hold for about 6 seconds, then lower your leg and switch to the other leg. 5. Repeat 8 to 12 times on each leg. 6. Over time, work up to holding for 10 to 30 seconds each time. 7. If you feel stable and secure with your leg raised, try raising the opposite arm straight out in front of you at the same time. Knee-to-chest exercise    1. Lie on your back with your knees bent and your feet flat on the floor. 2. Bring one knee to your chest, keeping the other foot flat on the floor (or keeping the other leg straight, whichever feels better on your lower back). 3. Keep your lower back pressed to the floor. Hold for at least 15 to 30 seconds. 4. Relax, and lower the knee to the starting position. 5. Repeat with the other leg. Repeat 2 to 4 times with each leg.   6. To get more stretch, put your other leg flat on the floor while pulling your knee to your chest.  Curl-ups    1. Lie on the floor on your back with your knees bent at a 90-degree angle. Your feet should be flat on the floor, about 12 inches from your buttocks. 2. Cross your arms over your chest. If this bothers your neck, try putting your hands behind your neck (not your head), with your elbows spread apart. 3. Slowly tighten your belly muscles and raise your shoulder blades off the floor. 4. Keep your head in line with your body, and do not press your chin to your chest.  5. Hold this position for 1 or 2 seconds, then slowly lower yourself back down to the floor. 6. Repeat 8 to 12 times. Pelvic tilt exercise    1. Lie on your back with your knees bent. 2. \"Brace\" your stomach. This means to tighten your muscles by pulling in and imagining your belly button moving toward your spine. You should feel like your back is pressing to the floor and your hips and pelvis are rocking back. 3. Hold for about 6 seconds while you breathe smoothly. 4. Repeat 8 to 12 times. Heel dig bridging    1. Lie on your back with both knees bent and your ankles bent so that only your heels are digging into the floor. Your knees should be bent about 90 degrees. 2. Then push your heels into the floor, squeeze your buttocks, and lift your hips off the floor until your shoulders, hips, and knees are all in a straight line. 3. Hold for about 6 seconds as you continue to breathe normally, and then slowly lower your hips back down to the floor and rest for up to 10 seconds. 4. Do 8 to 12 repetitions. Hamstring stretch in doorway    1. Lie on your back in a doorway, with one leg through the open door. 2. Slide your leg up the wall to straighten your knee. You should feel a gentle stretch down the back of your leg. 3. Hold the stretch for at least 15 to 30 seconds. Do not arch your back, point your toes, or bend either knee.  Keep one heel touching the floor and the other heel touching the wall. 4. Repeat with your other leg. 5. Do 2 to 4 times for each leg. Hip flexor stretch    1. Kneel on the floor with one knee bent and one leg behind you. Place your forward knee over your foot. Keep your other knee touching the floor. 2. Slowly push your hips forward until you feel a stretch in the upper thigh of your rear leg. 3. Hold the stretch for at least 15 to 30 seconds. Repeat with your other leg. 4. Do 2 to 4 times on each side. Wall sit    1. Stand with your back 10 to 12 inches away from a wall. 2. Lean into the wall until your back is flat against it. 3. Slowly slide down until your knees are slightly bent, pressing your lower back into the wall. 4. Hold for about 6 seconds, then slide back up the wall. 5. Repeat 8 to 12 times. Follow-up care is a key part of your treatment and safety. Be sure to make and go to all appointments, and call your doctor if you are having problems. It's also a good idea to know your test results and keep a list of the medicines you take. Where can you learn more? Go to http://shruthi-ashley.info/. Enter G661 in the search box to learn more about \"Low Back Pain: Exercises. \"  Current as of: March 21, 2017  Content Version: 11.4  © 1471-8533 [employer]. Care instructions adapted under license by [employer] (which disclaims liability or warranty for this information). If you have questions about a medical condition or this instruction, always ask your healthcare professional. [place] [address] any warranty or liability for your use of this information. Back Pain, Emergency or Urgent Symptoms: Care Instructions  Your Care Instructions    Many people have back pain at one time or another. In most cases, pain gets better with self-care that includes over-the-counter pain medicine, ice, heat, and exercises.   Unless you have symptoms of a severe injury or heart attack, you may be able to give yourself a few days before you call a doctor. But some back problems are very serious. Do not ignore symptoms that need to be checked right away. Follow-up care is a key part of your treatment and safety. Be sure to make and go to all appointments, and call your doctor if you are having problems. It's also a good idea to know your test results and keep a list of the medicines you take. How can you care for yourself at home? · Sit or lie in positions that are most comfortable and that reduce your pain. Try one of these positions when you lie down:  ¨ Lie on your back with your knees bent and supported by large pillows. ¨ Lie on the floor with your legs on the seat of a sofa or chair. Verlinda Amna on your side with your knees and hips bent and a pillow between your legs. ¨ Lie on your stomach if it does not make pain worse. · Do not sit up in bed, and avoid soft couches and twisted positions. Bed rest can help relieve pain at first, but it delays healing. Avoid bed rest after the first day. · Change positions every 30 minutes. If you must sit for long periods of time, take breaks from sitting. Get up and walk around, or lie flat. · Try using a heating pad on a low or medium setting, for 15 to 20 minutes every 2 or 3 hours. Try a warm shower in place of one session with the heating pad. You can also buy single-use heat wraps that last up to 8 hours. You can also try ice or cold packs on your back for 10 to 20 minutes at a time, several times a day. (Put a thin cloth between the ice pack and your skin.) This reduces pain and makes it easier to be active and exercise. · Take pain medicines exactly as directed. ¨ If the doctor gave you a prescription medicine for pain, take it as prescribed. ¨ If you are not taking a prescription pain medicine, ask your doctor if you can take an over-the-counter medicine. When should you call for help? Call 911 anytime you think you may need emergency care. For example, call if:  ? · You are unable to move a leg at all. ? · You have back pain with severe belly pain. ? · You have symptoms of a heart attack. These may include:  ¨ Chest pain or pressure, or a strange feeling in the chest.  ¨ Sweating. ¨ Shortness of breath. ¨ Nausea or vomiting. ¨ Pain, pressure, or a strange feeling in the back, neck, jaw, or upper belly or in one or both shoulders or arms. ¨ Lightheadedness or sudden weakness. ¨ A fast or irregular heartbeat. After you call 911, the  may tell you to chew 1 adult-strength or 2 to 4 low-dose aspirin. Wait for an ambulance. Do not try to drive yourself. ?Call your doctor now or seek immediate medical care if:  ? · You have new or worse symptoms in your arms, legs, chest, belly, or buttocks. Symptoms may include:  ¨ Numbness or tingling. ¨ Weakness. ¨ Pain. ? · You lose bladder or bowel control. ? · You have back pain and:  ¨ You have injured your back while lifting or doing some other activity. Call if the pain is severe, has not gone away after 1 or 2 days, and you cannot do your normal daily activities. ¨ You have had a back injury before that needed treatment. ¨ Your pain has lasted longer than 4 weeks. ¨ You have had weight loss you cannot explain. ¨ You are age 48 or older. ¨ You have cancer now or have had it before. ? Watch closely for changes in your health, and be sure to contact your doctor if you are not getting better as expected. Where can you learn more? Go to http://shruthi-ashley.info/. Enter O820 in the search box to learn more about \"Back Pain, Emergency or Urgent Symptoms: Care Instructions. \"  Current as of: March 20, 2017  Content Version: 11.4  © 1284-8675 [employer]. Care instructions adapted under license by [employer] (which disclaims liability or warranty for this information).  If you have questions about a medical condition or this instruction, always ask your healthcare professional. [place] [address] any warranty or liability for your use of this information.

## 2018-06-11 NOTE — PROGRESS NOTES
Chief Complaint   Patient presents with    Back Pain     Pt c/o lower back pain that radiates up to mid back area. Pt states she has had the pain for almost 1 1/2 month. Pt states she has been taking Tylenol, motrin, and heating pad with no relief. Pt states pain at worse is 10/10. Pt reports no injury, or fall to irritate the area.

## 2018-06-11 NOTE — MR AVS SNAPSHOT
[address] [place] [id number] 
[phone] Patient: Radha Donovan 
MRN: [id number] :1965 Visit Information Date & Time Provider Department Dept. Phone Encounter #  
 2018  4:00 PM Tim Sesay  [facility] [phone] [id number] Follow-up Instructions Return in about 2 weeks (around 2018). Your Appointments 2018  9:15 AM  
Any with Tim S[place]ay [place] [address] [facility] ([facility]) Appt Note: f/u on diabetes tr 18  
 [id number] [address] [place] [id number]  
[facility] [address] Upcoming Health Maintenance Date Due FOBT Q 1 YEAR AGE 50-75 2016 Influenza Age 5 to Adult 2018 BREAST CANCER SCRN MAMMOGRAM 2018 DTaP/Tdap/Td series (2 - Td) 2026 Allergies as of 2018  Review Complete On: 2018 By: Burak Sneed LPN Severity Noted Reaction Type Reactions Pcn [Penicillins]  2015    Rash Pcn [Penicillins]  2016    Rash, Swelling Percocet [Oxycodone-acetaminophen]  2016    Other (comments)  
 hypotension Sulfa (Sulfonamide Antibiotics)  2015    Hives Sulfa (Sulfonamide Antibiotics)  2016    Rash, Swelling Current Immunizations  Reviewed on 2016 Name Date Influenza Vaccine (Quad) PF 2016 Tdap 2016 Not reviewed this visit You Were Diagnosed With   
  
 Codes Comments Acute right-sided low back pain with right-sided sciatica    -  Primary ICD-10-CM: M54.41 
ICD-9-CM: 724.2, 724.3 Vitals BP Pulse Temp Resp Height(growth percentile) Weight(growth percentile) 109/74 91 97.5 °F (36.4 °C) (Oral) 19 5' 2.5\" (1.588 m) 190 lb 6.4 oz (86.4 kg) SpO2 BMI OB Status Smoking Status 98% 34.27 kg/m2 Hysterectomy Never Smoker BMI and BSA Data Body Mass Index Body Surface Area  
 34.27 kg/m 2 1.95 m 2 Preferred Pharmacy Pharmacy Name Phone [id number] [address] [place] [address] [phone] Your Updated Medication List  
  
   
This list is accurate as of 6/11/18  4:12 PM.  Always use your most recent med list.  
  
  
  
  
 albuterol 90 mcg/actuation inhaler Commonly known as:  PROVENTIL HFA, VENTOLIN HFA, PROAIR HFA Take 2 Puffs by inhalation every four (4) hours as needed for Wheezing. cetirizine 10 mg tablet Commonly known as:  ZYRTEC Take 1 Tab by mouth daily. cyclobenzaprine 5 mg tablet Commonly known as:  FLEXERIL Take 1 Tab by mouth nightly as needed for Muscle Spasm(s). diclofenac EC 50 mg EC tablet Commonly known as:  VOLTAREN Take 1 Tab by mouth two (2) times daily (with meals) for 10 days. May use q12 hr prn with food after the first 10 days. escitalopram oxalate 10 mg tablet Commonly known as:  [place] TAKE 1 TABLET BY MOUTH EVERY DAY  
  
 fluticasone 50 mcg/actuation nasal spray Commonly known as:  [place]ine [place] 2 Sprays by Both Nostrils route daily. hydroCHLOROthiazide 25 mg tablet Commonly known as:  HYDRODIURIL Take 1 Tab by mouth daily. inhalational spacing device For use with inhaler Prescriptions Sent to Pharmacy Refills  
 diclofenac EC (VOLTAREN) 50 mg EC tablet 1 Sig: Take 1 Tab by mouth two (2) times daily (with meals) for 10 days. May use q12 hr prn with food after the first 10 days. Class: Normal  
 Pharmacy: [address] [place] [address] Ph #: [phone] Route: Oral  
 cyclobenzaprine (FLEXERIL) 5 mg tablet 0 Sig: Take 1 Tab by mouth nightly as needed for Muscle Spasm(s). Class: Normal  
 Pharmacy: [address] [place] [address] Ph #: [phone]  Route: Oral  
  
 Follow-up Instructions Return in about 2 weeks (around 6/25/2018). Patient Instructions Low Back Pain: Exercises Your Care Instructions Here are some examples of typical rehabilitation exercises for your condition. Start each exercise slowly. Ease off the exercise if you start to have pain. Your doctor or physical therapist will tell you when you can start these exercises and which ones will work best for you. How to do the exercises Press-up 1. Lie on your stomach, supporting your body with your forearms. 2. Press your elbows down into the floor to raise your upper back. As you do this, relax your stomach muscles and allow your back to arch without using your back muscles. As your press up, do not let your hips or pelvis come off the floor. 3. Hold for 15 to 30 seconds, then relax. 4. Repeat 2 to 4 times. Alternate arm and leg (bird dog) exercise Do this exercise slowly. Try to keep your body straight at all times, and do not let one hip drop lower than the other. 1. Start on the floor, on your hands and knees. 2. Tighten your belly muscles. 3. Raise one leg off the floor, and hold it straight out behind you. Be careful not to let your hip drop down, because that will twist your trunk. 4. Hold for about 6 seconds, then lower your leg and switch to the other leg. 5. Repeat 8 to 12 times on each leg. 6. Over time, work up to holding for 10 to 30 seconds each time. 7. If you feel stable and secure with your leg raised, try raising the opposite arm straight out in front of you at the same time. Knee-to-chest exercise 1. Lie on your back with your knees bent and your feet flat on the floor. 2. Bring one knee to your chest, keeping the other foot flat on the floor (or keeping the other leg straight, whichever feels better on your lower back). 3. Keep your lower back pressed to the floor. Hold for at least 15 to 30 seconds. 4. Relax, and lower the knee to the starting position. 5. Repeat with the other leg. Repeat 2 to 4 times with each leg. 6. To get more stretch, put your other leg flat on the floor while pulling your knee to your chest. 
Curl-ups 1. Lie on the floor on your back with your knees bent at a 90-degree angle. Your feet should be flat on the floor, about 12 inches from your buttocks. 2. Cross your arms over your chest. If this bothers your neck, try putting your hands behind your neck (not your head), with your elbows spread apart. 3. Slowly tighten your belly muscles and raise your shoulder blades off the floor. 4. Keep your head in line with your body, and do not press your chin to your chest. 
5. Hold this position for 1 or 2 seconds, then slowly lower yourself back down to the floor. 6. Repeat 8 to 12 times. Pelvic tilt exercise 1. Lie on your back with your knees bent. 2. \"Brace\" your stomach. This means to tighten your muscles by pulling in and imagining your belly button moving toward your spine. You should feel like your back is pressing to the floor and your hips and pelvis are rocking back. 3. Hold for about 6 seconds while you breathe smoothly. 4. Repeat 8 to 12 times. Heel dig bridging 1. Lie on your back with both knees bent and your ankles bent so that only your heels are digging into the floor. Your knees should be bent about 90 degrees. 2. Then push your heels into the floor, squeeze your buttocks, and lift your hips off the floor until your shoulders, hips, and knees are all in a straight line. 3. Hold for about 6 seconds as you continue to breathe normally, and then slowly lower your hips back down to the floor and rest for up to 10 seconds. 4. Do 8 to 12 repetitions. Hamstring stretch in doorway 1. Lie on your back in a doorway, with one leg through the open door. 2. Slide your leg up the wall to straighten your knee.  You should feel a gentle stretch down the back of your leg. 3. Hold the stretch for at least 15 to 30 seconds. Do not arch your back, point your toes, or bend either knee. Keep one heel touching the floor and the other heel touching the wall. 4. Repeat with your other leg. 5. Do 2 to 4 times for each leg. Hip flexor stretch 1. Kneel on the floor with one knee bent and one leg behind you. Place your forward knee over your foot. Keep your other knee touching the floor. 2. Slowly push your hips forward until you feel a stretch in the upper thigh of your rear leg. 3. Hold the stretch for at least 15 to 30 seconds. Repeat with your other leg. 4. Do 2 to 4 times on each side. Wall sit 1. Stand with your back 10 to 12 inches away from a wall. 2. Lean into the wall until your back is flat against it. 3. Slowly slide down until your knees are slightly bent, pressing your lower back into the wall. 4. Hold for about 6 seconds, then slide back up the wall. 5. Repeat 8 to 12 times. Follow-up care is a key part of your treatment and safety. Be sure to make and go to all appointments, and call your doctor if you are having problems. It's also a good idea to know your test results and keep a list of the medicines you take. Where can you learn more? Go to http://shruthi-ashley.info/. Enter W478 in the search box to learn more about \"Low Back Pain: Exercises. \" Current as of: March 21, 2017 Content Version: 11.4 © 5112-2981 [employer] ([employer]). Care instructions adapted under license by [employer] (which disclaims liability or warranty for this information). If you have questions about a medical condition or this instruction, always ask your healthcare professional. [place] [address] any warranty or liability for your use of this information. Back Pain, Emergency or Urgent Symptoms: Care Instructions Your Care Instructions Many people have back pain at one time or another. In most cases, pain gets better with self-care that includes over-the-counter pain medicine, ice, heat, and exercises. Unless you have symptoms of a severe injury or heart attack, you may be able to give yourself a few days before you call a doctor. But some back problems are very serious. Do not ignore symptoms that need to be checked right away. Follow-up care is a key part of your treatment and safety. Be sure to make and go to all appointments, and call your doctor if you are having problems. It's also a good idea to know your test results and keep a list of the medicines you take. How can you care for yourself at home? · Sit or lie in positions that are most comfortable and that reduce your pain. Try one of these positions when you lie down: ¨ Lie on your back with your knees bent and supported by large pillows. ¨ Lie on the floor with your legs on the seat of a sofa or chair. Gareld Sameer on your side with your knees and hips bent and a pillow between your legs. ¨ Lie on your stomach if it does not make pain worse. · Do not sit up in bed, and avoid soft couches and twisted positions. Bed rest can help relieve pain at first, but it delays healing. Avoid bed rest after the first day. · Change positions every 30 minutes. If you must sit for long periods of time, take breaks from sitting. Get up and walk around, or lie flat. · Try using a heating pad on a low or medium setting, for 15 to 20 minutes every 2 or 3 hours. Try a warm shower in place of one session with the heating pad. You can also buy single-use heat wraps that last up to 8 hours. You can also try ice or cold packs on your back for 10 to 20 minutes at a time, several times a day. (Put a thin cloth between the ice pack and your skin.) This reduces pain and makes it easier to be active and exercise. · Take pain medicines exactly as directed. ¨ If the doctor gave you a prescription medicine for pain, take it as prescribed. ¨ If you are not taking a prescription pain medicine, ask your doctor if you can take an over-the-counter medicine. When should you call for help? Call 911 anytime you think you may need emergency care. For example, call if: 
? · You are unable to move a leg at all. ? · You have back pain with severe belly pain. ? · You have symptoms of a heart attack. These may include: ¨ Chest pain or pressure, or a strange feeling in the chest. 
¨ Sweating. ¨ Shortness of breath. ¨ Nausea or vomiting. ¨ Pain, pressure, or a strange feeling in the back, neck, jaw, or upper belly or in one or both shoulders or arms. ¨ Lightheadedness or sudden weakness. ¨ A fast or irregular heartbeat. After you call 911, the  may tell you to chew 1 adult-strength or 2 to 4 low-dose aspirin. Wait for an ambulance. Do not try to drive yourself. ?Call your doctor now or seek immediate medical care if: 
? · You have new or worse symptoms in your arms, legs, chest, belly, or buttocks. Symptoms may include: ¨ Numbness or tingling. ¨ Weakness. ¨ Pain. ? · You lose bladder or bowel control. ? · You have back pain and: 
¨ You have injured your back while lifting or doing some other activity. Call if the pain is severe, has not gone away after 1 or 2 days, and you cannot do your normal daily activities. ¨ You have had a back injury before that needed treatment. ¨ Your pain has lasted longer than 4 weeks. ¨ You have had weight loss you cannot explain. ¨ You are age 48 or older. ¨ You have cancer now or have had it before. ? Watch closely for changes in your health, and be sure to contact your doctor if you are not getting better as expected. Where can you learn more? Go to http://shruthi-ashley.info/. Enter K636 in the search box to learn more about \"Back Pain, Emergency or Urgent Symptoms: Care Instructions. \" 
 Current as of: March 20, 2017 Content Version: 11.4 © 5615-5627 Healthwise, [employer]. Care instructions adapted under license by [employer] (which disclaims liability or warranty for this information). If you have questions about a medical condition or this instruction, always ask your healthcare professional. [address] any warranty or liability for your use of this information. Introducing \[facility] [place] [facility]\"" & HEALTH SERVICES! Dear Patricia Felix: 
Thank you for requesting a [employer] account. Our records indicate that you already have an active [employer] account. You can access your account anytime at [email or web address] Did you know that you can access your hospital and ER discharge instructions at any time in [employer]? You can also review all of your test results from your hospital stay or ER visit. Additional Information If you have questions, please visit the Frequently Asked Questions section of the [employer] website at [email or web address] Remember, [employer] is NOT to be used for urgent needs. For medical emergencies, dial 911. Now available from your iPhone and Android! Please provide this summary of care documentation to your next provider. Your primary care clinician is listed as Daniel Hickey. If you have any questions after today's visit, please call [phone].

## 2018-06-22 ENCOUNTER — OFFICE VISIT (OUTPATIENT)
Dept: FAMILY MEDICINE CLINIC | Age: 53
End: 2018-06-22

## 2018-06-22 VITALS
DIASTOLIC BLOOD PRESSURE: 84 MMHG | BODY MASS INDEX: 33.95 KG/M2 | OXYGEN SATURATION: 94 % | WEIGHT: 191.6 LBS | RESPIRATION RATE: 17 BRPM | TEMPERATURE: 97.8 F | HEIGHT: 63 IN | SYSTOLIC BLOOD PRESSURE: 116 MMHG | HEART RATE: 84 BPM

## 2018-06-22 DIAGNOSIS — I10 ESSENTIAL HYPERTENSION WITH GOAL BLOOD PRESSURE LESS THAN 130/80: Primary | ICD-10-CM

## 2018-06-22 DIAGNOSIS — M54.41 ACUTE MIDLINE LOW BACK PAIN WITH RIGHT-SIDED SCIATICA: ICD-10-CM

## 2018-06-22 DIAGNOSIS — E78.00 HYPERCHOLESTEREMIA: ICD-10-CM

## 2018-06-22 DIAGNOSIS — R73.03 PREDIABETES: ICD-10-CM

## 2018-06-22 NOTE — PROGRESS NOTES
Chief Complaint   Patient presents with    Follow-up     DM, back pain    Labs     fasting lipids     Pt here for follow up with provider for DM, and back pain. Pt states back pain has gotten better, but is still bothersome. Pt states the medication did help for a few days then started to wear off. Pt is fasting for lipid panel.

## 2018-06-22 NOTE — PATIENT INSTRUCTIONS
Learning About Relief for Back Pain  What is back tension and strain? Back strain happens when you overstretch, or pull, a muscle in your back. You may hurt your back in an accident or when you exercise or lift something. Most back pain will get better with rest and time. You can take care of yourself at home to help your back heal.  What can you do first to relieve back pain? When you first feel back pain, try these steps:  · Walk. Take a short walk (10 to 20 minutes) on a level surface (no slopes, hills, or stairs) every 2 to 3 hours. Walk only distances you can manage without pain, especially leg pain. · Relax. Find a comfortable position for rest. Some people are comfortable on the floor or a medium-firm bed with a small pillow under their head and another under their knees. Some people prefer to lie on their side with a pillow between their knees. Don't stay in one position for too long. · Try heat or ice. Try using a heating pad on a low or medium setting, or take a warm shower, for 15 to 20 minutes every 2 to 3 hours. Or you can buy single-use heat wraps that last up to 8 hours. You can also try an ice pack for 10 to 15 minutes every 2 to 3 hours. You can use an ice pack or a bag of frozen vegetables wrapped in a thin towel. There is not strong evidence that either heat or ice will help, but you can try them to see if they help. You may also want to try switching between heat and cold. · Take pain medicine exactly as directed. ¨ If the doctor gave you a prescription medicine for pain, take it as prescribed. ¨ If you are not taking a prescription pain medicine, ask your doctor if you can take an over-the-counter medicine. What else can you do? · Stretch and exercise. Exercises that increase flexibility may relieve your pain and make it easier for your muscles to keep your spine in a good, neutral position. And don't forget to keep walking. · Do self-massage.  You can use self-massage to unwind after work or school or to energize yourself in the morning. You can easily massage your feet, hands, or neck. Self-massage works best if you are in comfortable clothes and are sitting or lying in a comfortable position. Use oil or lotion to massage bare skin. · Reduce stress. Back pain can lead to a vicious [place]: Distress about the pain tenses the muscles in your back, which in turn causes more pain. Learn how to relax your mind and your muscles to lower your stress. Where can you learn more? Go to http://shruthi-ashley.info/. Enter T883 in the search box to learn more about \"Learning About Relief for Back Pain. \"  Current as of: March 21, 2017  Content Version: 11.4  © 8876-2747 [employer]. Care instructions adapted under license by [employer] (which disclaims liability or warranty for this information). If you have questions about a medical condition or this instruction, always ask your healthcare professional. [place] [address] any warranty or liability for your use of this information. DASH Diet: Care Instructions  Your Care Instructions    The DASH diet is an eating plan that can help lower your blood pressure. DASH stands for Dietary Approaches to Stop Hypertension. Hypertension is high blood pressure. The DASH diet focuses on eating foods that are high in calcium, potassium, and magnesium. These nutrients can lower blood pressure. The foods that are highest in these nutrients are fruits, vegetables, low-fat dairy products, nuts, seeds, and legumes. But taking calcium, potassium, and magnesium supplements instead of eating foods that are high in those nutrients does not have the same effect. The DASH diet also includes whole grains, fish, and poultry. The DASH diet is one of several lifestyle changes your doctor may recommend to lower your high blood pressure. Your doctor may also want you to decrease the amount of sodium in your diet. Lowering sodium while following the DASH diet can lower blood pressure even further than just the DASH diet alone. Follow-up care is a key part of your treatment and safety. Be sure to make and go to all appointments, and call your doctor if you are having problems. It's also a good idea to know your test results and keep a list of the medicines you take. How can you care for yourself at home? Following the DASH diet  · Eat 4 to 5 servings of fruit each day. A serving is 1 medium-sized piece of fruit, ½ cup chopped or canned fruit, 1/4 cup dried fruit, or 4 ounces (½ cup) of fruit juice. Choose fruit more often than fruit juice. · Eat 4 to 5 servings of vegetables each day. A serving is 1 cup of lettuce or raw leafy vegetables, ½ cup of chopped or cooked vegetables, or 4 ounces (½ cup) of vegetable juice. Choose vegetables more often than vegetable juice. · Get 2 to 3 servings of low-fat and fat-free dairy each day. A serving is 8 ounces of milk, 1 cup of yogurt, or 1 ½ ounces of cheese. · Eat 6 to 8 servings of grains each day. A serving is 1 slice of bread, 1 ounce of dry cereal, or ½ cup of cooked rice, pasta, or cooked cereal. Try to choose whole-grain products as much as possible. · Limit lean meat, poultry, and fish to 2 servings each day. A serving is 3 ounces, about the size of a deck of cards. · Eat 4 to 5 servings of nuts, seeds, and legumes (cooked dried beans, lentils, and split peas) each week. A serving is 1/3 cup of nuts, 2 tablespoons of seeds, or ½ cup of cooked beans or peas. · Limit fats and oils to 2 to 3 servings each day. A serving is 1 teaspoon of vegetable oil or 2 tablespoons of salad dressing. · Limit sweets and added sugars to 5 servings or less a week. A serving is 1 tablespoon jelly or jam, ½ cup sorbet, or 1 cup of lemonade. · Eat less than 2,300 milligrams (mg) of sodium a day. If you limit your sodium to 1,500 mg a day, you can lower your blood pressure even more.   Tips for success  · Start small. Do not try to make dramatic changes to your diet all at once. You might feel that you are missing out on your favorite foods and then be more likely to not follow the plan. Make small changes, and stick with them. Once those changes become habit, add a few more changes. · Try some of the following:  ¨ Make it a goal to eat a fruit or vegetable at every meal and at snacks. This will make it easy to get the recommended amount of fruits and vegetables each day. ¨ Try yogurt topped with fruit and nuts for a snack or healthy dessert. ¨ Add lettuce, tomato, cucumber, and onion to sandwiches. ¨ Combine a ready-made pizza crust with low-fat mozzarella cheese and lots of vegetable toppings. Try using tomatoes, squash, spinach, broccoli, carrots, cauliflower, and onions. ¨ Have a variety of cut-up vegetables with a low-fat dip as an appetizer instead of chips and dip. ¨ Sprinkle sunflower seeds or chopped almonds over salads. Or try adding chopped walnuts or almonds to cooked vegetables. ¨ Try some vegetarian meals using beans and peas. Add garbanzo or kidney beans to salads. Make burritos and tacos with mashed [place] beans or black beans. Where can you learn more? Go to http://shruthi-ashley.info/. Enter B093 in the search box to learn more about \"DASH Diet: Care Instructions. \"  Current as of: September 21, 2016  Content Version: 11.4  © 4088-0240 [employer]. Care instructions adapted under license by [employer] (which disclaims liability or warranty for this information). If you have questions about a medical condition or this instruction, always ask your healthcare professional. [place] [address] any warranty or liability for your use of this information. High Cholesterol: Care Instructions  Your Care Instructions    Cholesterol is a type of fat in your blood. It is needed for many body functions, such as making new cells. Cholesterol is made by your body. It also comes from food you eat. High cholesterol means that you have too much of the fat in your blood. This raises your risk of a heart attack and stroke. LDL and HDL are part of your total cholesterol. LDL is the \"bad\" cholesterol. High LDL can raise your risk for heart disease, heart attack, and stroke. HDL is the \"good\" cholesterol. It helps clear bad cholesterol from the body. High HDL is linked with a lower risk of heart disease, heart attack, and stroke. Your cholesterol levels help your doctor find out your risk for having a heart attack or stroke. You and your doctor can talk about whether you need to lower your risk and what treatment is best for you. A heart-healthy lifestyle along with medicines can help lower your cholesterol and your risk. The way you choose to lower your risk will depend on how high your risk is for heart attack and stroke. It will also depend on how you feel about taking medicines. Follow-up care is a key part of your treatment and safety. Be sure to make and go to all appointments, and call your doctor if you are having problems. It's also a good idea to know your test results and keep a list of the medicines you take. How can you care for yourself at home? · Eat a variety of foods every day. Good choices include fruits, vegetables, whole grains (like oatmeal), dried beans and peas, nuts and seeds, soy products (like tofu), and fat-free or low-fat dairy products. · Replace butter, margarine, and hydrogenated or partially hydrogenated oils with olive and canola oils. (Canola oil margarine without trans fat is fine.)  · Replace red meat with fish, poultry, and soy protein (like tofu). · Limit processed and packaged foods like chips, crackers, and cookies. · Bake, broil, or steam foods. Don't olguin them. · Be physically active. Get at least 30 minutes of exercise on most days of the week. Walking is a good choice.  You also may want to do other activities, such as running, swimming, cycling, or playing tennis or team sports. · Stay at a healthy weight or lose weight by making the changes in eating and physical activity listed above. Losing just a small amount of weight, even 5 to 10 pounds, can reduce your risk for having a heart attack or stroke. · Do not smoke. When should you call for help? Watch closely for changes in your health, and be sure to contact your doctor if:  ? · You need help making lifestyle changes. ? · You have questions about your medicine. Where can you learn more? Go to http://shruthiGreen Farms Energyashley.info/. Enter V013 in the search box to learn more about \"High Cholesterol: Care Instructions. \"  Current as of: September 21, 2016  Content Version: 11.4  © 3995-5184 [employer]. Care instructions adapted under license by [employer] (which disclaims liability or warranty for this information). If you have questions about a medical condition or this instruction, always ask your healthcare professional. [address] any warranty or liability for your use of this information. Starting a Weight Loss Plan: Care Instructions  Your Care Instructions    If you are thinking about losing weight, it can be hard to know where to start. Your doctor can help you set up a weight loss plan that best meets your needs. You may want to take a class on nutrition or exercise, or join a weight loss support group. If you have questions about how to make changes to your eating or exercise habits, ask your doctor about seeing a registered dietitian or an exercise specialist.  It can be a big challenge to lose weight. But you do not have to make huge changes at once. Make small changes, and stick with them. When those changes become habit, add a few more changes. If you do not think you are ready to make changes right now, try to pick a date in the future.  Make an appointment to see your doctor to discuss whether the time is right for you to start a plan. Follow-up care is a key part of your treatment and safety. Be sure to make and go to all appointments, and call your doctor if you are having problems. It's also a good idea to know your test results and keep a list of the medicines you take. How can you care for yourself at home? · Set realistic goals. Many people expect to lose much more weight than is likely. A weight loss of 5% to 10% of your body weight may be enough to improve your health. · Get family and friends involved to provide support. Talk to them about why you are trying to lose weight, and ask them to help. They can help by participating in exercise and having meals with you, even if they may be eating something different. · Find what works best for you. If you do not have time or do not like to cook, a program that offers meal replacement bars or shakes may be better for you. Or if you like to prepare meals, finding a plan that includes daily menus and recipes may be best.  · Ask your doctor about other health professionals who can help you achieve your weight loss goals. ¨ A dietitian can help you make healthy changes in your diet. ¨ An exercise specialist or  can help you develop a safe and effective exercise program.  ¨ A counselor or psychiatrist can help you cope with issues such as depression, anxiety, or family problems that can make it hard to focus on weight loss. · Consider joining a support group for people who are trying to lose weight. Your doctor can suggest groups in your area. Where can you learn more? Go to http://shruthi-ashley.info/. Enter R285 in the search box to learn more about \"Starting a Weight Loss Plan: Care Instructions. \"  Current as of: October 13, 2016  Content Version: 11.4  © 2571-8746 [employer].  Care instructions adapted under license by [employer] (which disclaims liability or warranty for this information). If you have questions about a medical condition or this instruction, always ask your healthcare professional. [place] [address] any warranty or liability for your use of this information.

## 2018-06-22 NOTE — MR AVS SNAPSHOT
[address] [place] [id number] 
[phone] Patient: Gissel Craig 
MRN: [id number] :[id number] Visit Information Date & Time Provider Department Dept. Phone Encounter #  
 2018  9:15 AM [facility] Oakes  \[facility]\"" Primary Care [phone] [id number] Follow-up Instructions Return in about 3 months (around 2018) for f/u prediabetes. Upcoming Health Maintenance Date Due FOBT Q 1 YEAR AGE 50-75 2016 Influenza Age 5 to Adult 2018 BREAST CANCER SCRN MAMMOGRAM 2018 DTaP/Tdap/Td series (2 - Td) 2026 Allergies as of 2018  Review Complete On: 2018 By: Dino Kohler LPN Severity Noted Reaction Type Reactions Pcn [Penicillins]  2015    Rash Pcn [Penicillins]  2016    Rash, Swelling Percocet [Oxycodone-acetaminophen]  2016    Other (comments)  
 hypotension Sulfa (Sulfonamide Antibiotics)  2015    Hives Sulfa (Sulfonamide Antibiotics)  2016    Rash, Swelling Current Immunizations  Reviewed on 2016 Name Date Influenza Vaccine (Quad) PF 2016 Tdap 2016 Not reviewed this visit You Were Diagnosed With   
  
 Codes Comments Essential hypertension with goal blood pressure less than 130/80    -  Primary ICD-10-CM: I10 
ICD-9-CM: 401.9 Prediabetes     ICD-10-CM: R73.03 
ICD-9-CM: 790.29 Hypercholesteremia     ICD-10-CM: E78.00 ICD-9-CM: 272.0 Acute midline low back pain with right-sided sciatica     ICD-10-CM: M54.41 
ICD-9-CM: 724.2, 724.3 Vitals BP Pulse Temp Resp Height(growth percentile) Weight(growth percentile) 116/84 84 97.8 °F (36.6 °C) (Oral) 17 5' 2.5\" (1.588 m) 191 lb 9.6 oz (86.9 kg) SpO2 BMI OB Status Smoking Status 94% 34.49 kg/m2 Hysterectomy Never Smoker BMI and BSA Data Body Mass Index Body Surface Area 34.49 kg/m 2 1.96 m 2 Preferred Pharmacy Pharmacy Name Phone [id number] [phone] Your Updated Medication List  
  
   
This list is accurate as of 6/22/18  9:42 AM.  Always use your most recent med list.  
  
  
  
  
 albuterol 90 mcg/actuation inhaler Commonly known as:  PROVENTIL HFA, VENTOLIN HFA, PROAIR HFA Take 2 Puffs by inhalation every four (4) hours as needed for Wheezing. cetirizine 10 mg tablet Commonly known as:  ZYRTEC Take 1 Tab by mouth daily. cyclobenzaprine 5 mg tablet Commonly known as:  FLEXERIL Take 1 Tab by mouth nightly as needed for Muscle Spasm(s). escitalopram oxalate 10 mg tablet Commonly known as:  Napolean Mantle TAKE 1 TABLET BY MOUTH EVERY DAY  
  
 fluticasone 50 mcg/actuation nasal spray Commonly known as:  S[place] Galla 2 Sprays by Both Nostrils route daily. hydroCHLOROthiazide 25 mg tablet Commonly known as:  HYDRODIURIL Take 1 Tab by mouth daily. inhalational spacing device For use with inhaler We Performed the Following LIPID PANEL [46407 CPT(R)] METABOLIC PANEL, COMPREHENSIVE [01510 CPT(R)] REFERRAL TO PHYSICAL THERAPY [QUQ26 Custom] Comments:  
 eval and treat midline low back pain with right sciatica- pivot PT Follow-up Instructions Return in about 3 months (around 9/22/2018) for f/u prediabetes. Referral Information Referral ID Referred By Referred To  
  
 [id number] Jacob Quintana Not Available Visits Status Start Date End Date 1 New Request 6/22/18 6/22/19 If your referral has a status of pending review or denied, additional information will be sent to support the outcome of this decision. Patient Instructions Learning About Relief for Back Pain What is back tension and strain? Back strain happens when you overstretch, or pull, a muscle in your back. You may hurt your back in an accident or when you exercise or lift something. Most back pain will get better with rest and time. You can take care of yourself at home to help your back heal. 
What can you do first to relieve back pain? When you first feel back pain, try these steps: 
· Walk. Take a short walk (10 to 20 minutes) on a level surface (no slopes, hills, or stairs) every 2 to 3 hours. Walk only distances you can manage without pain, especially leg pain. · Relax. Find a comfortable position for rest. Some people are comfortable on the floor or a medium-firm bed with a small pillow under their head and another under their knees. Some people prefer to lie on their side with a pillow between their knees. Don't stay in one position for too long. · Try heat or ice. Try using a heating pad on a low or medium setting, or take a warm shower, for 15 to 20 minutes every 2 to 3 hours. Or you can buy single-use heat wraps that last up to 8 hours. You can also try an ice pack for 10 to 15 minutes every 2 to 3 hours. You can use an ice pack or a bag of frozen vegetables wrapped in a thin towel. There is not strong evidence that either heat or ice will help, but you can try them to see if they help. You may also want to try switching between heat and cold. · Take pain medicine exactly as directed. ¨ If the doctor gave you a prescription medicine for pain, take it as prescribed. ¨ If you are not taking a prescription pain medicine, ask your doctor if you can take an over-the-counter medicine. What else can you do? · Stretch and exercise. Exercises that increase flexibility may relieve your pain and make it easier for your muscles to keep your spine in a good, neutral position. And don't forget to keep walking. · Do self-massage.  You can use self-massage to unwind after work or school or to energize yourself in the morning. You can easily massage your feet, hands, or neck. Self-massage works best if you are in comfortable clothes and are sitting or lying in a comfortable position. Use oil or lotion to massage bare skin. · Reduce stress. Back pain can lead to a vicious [place]: Distress about the pain tenses the muscles in your back, which in turn causes more pain. Learn how to relax your mind and your muscles to lower your stress. Where can you learn more? Go to http://shruthi-ashley.info/. Enter N217 in the search box to learn more about \"Learning About Relief for Back Pain. \" Current as of: March 21, 2017 Content Version: 11.4 © 3130-2738 [employer]. Care instructions adapted under license by [employer] (which disclaims liability or warranty for this information). If you have questions about a medical condition or this instruction, always ask your healthcare professional. [place] [address] any warranty or liability for your use of this information. DASH Diet: Care Instructions Your Care Instructions The DASH diet is an eating plan that can help lower your blood pressure. DASH stands for Dietary Approaches to Stop Hypertension. Hypertension is high blood pressure. The DASH diet focuses on eating foods that are high in calcium, potassium, and magnesium. These nutrients can lower blood pressure. The foods that are highest in these nutrients are fruits, vegetables, low-fat dairy products, nuts, seeds, and legumes. But taking calcium, potassium, and magnesium supplements instead of eating foods that are high in those nutrients does not have the same effect. The DASH diet also includes whole grains, fish, and poultry. The DASH diet is one of several lifestyle changes your doctor may recommend to lower your high blood pressure. Your doctor may also want you to decrease the amount of sodium in your diet.  Lowering sodium while following the DASH diet can lower blood pressure even further than just the DASH diet alone. Follow-up care is a key part of your treatment and safety. Be sure to make and go to all appointments, and call your doctor if you are having problems. It's also a good idea to know your test results and keep a list of the medicines you take. How can you care for yourself at home? Following the DASH diet · Eat 4 to 5 servings of fruit each day. A serving is 1 medium-sized piece of fruit, ½ cup chopped or canned fruit, 1/4 cup dried fruit, or 4 ounces (½ cup) of fruit juice. Choose fruit more often than fruit juice. · Eat 4 to 5 servings of vegetables each day. A serving is 1 cup of lettuce or raw leafy vegetables, ½ cup of chopped or cooked vegetables, or 4 ounces (½ cup) of vegetable juice. Choose vegetables more often than vegetable juice. · Get 2 to 3 servings of low-fat and fat-free dairy each day. A serving is 8 ounces of milk, 1 cup of yogurt, or 1 ½ ounces of cheese. · Eat 6 to 8 servings of grains each day. A serving is 1 slice of bread, 1 ounce of dry cereal, or ½ cup of cooked rice, pasta, or cooked cereal. Try to choose whole-grain products as much as possible. · Limit lean meat, poultry, and fish to 2 servings each day. A serving is 3 ounces, about the size of a deck of cards. · Eat 4 to 5 servings of nuts, seeds, and legumes (cooked dried beans, lentils, and split peas) each week. A serving is 1/3 cup of nuts, 2 tablespoons of seeds, or ½ cup of cooked beans or peas. · Limit fats and oils to 2 to 3 servings each day. A serving is 1 teaspoon of vegetable oil or 2 tablespoons of salad dressing. · Limit sweets and added sugars to 5 servings or less a week. A serving is 1 tablespoon jelly or jam, ½ cup sorbet, or 1 cup of lemonade. · Eat less than 2,300 milligrams (mg) of sodium a day. If you limit your sodium to 1,500 mg a day, you can lower your blood pressure even more. Tips for success · Start small. Do not try to make dramatic changes to your diet all at once. You might feel that you are missing out on your favorite foods and then be more likely to not follow the plan. Make small changes, and stick with them. Once those changes become habit, add a few more changes. · Try some of the following: ¨ Make it a goal to eat a fruit or vegetable at every meal and at snacks. This will make it easy to get the recommended amount of fruits and vegetables each day. ¨ Try yogurt topped with fruit and nuts for a snack or healthy dessert. ¨ Add lettuce, tomato, cucumber, and onion to sandwiches. ¨ Combine a ready-made pizza crust with low-fat mozzarella cheese and lots of vegetable toppings. Try using tomatoes, squash, spinach, broccoli, carrots, cauliflower, and onions. ¨ Have a variety of cut-up vegetables with a low-fat dip as an appetizer instead of chips and dip. ¨ Sprinkle sunflower seeds or chopped almonds over salads. Or try adding chopped walnuts or almonds to cooked vegetables. ¨ Try some vegetarian meals using beans and peas. Add garbanzo or kidney beans to salads. Make burritos and tacos with mashed kingsley beans or black beans. Where can you learn more? Go to http://shruthi-ashley.info/. Enter O676 in the search box to learn more about \"DASH Diet: Care Instructions. \" Current as of: September 21, 2016 Content Version: 11.4 © 8082-4910 [employer]. Care instructions adapted under license by [employer] (which disclaims liability or warranty for this information). If you have questions about a medical condition or this instruction, always ask your healthcare professional. [place] [address] any warranty or liability for your use of this information. High Cholesterol: Care Instructions Your Care Instructions Cholesterol is a type of fat in your blood.  It is needed for many body functions, such as making new cells. Cholesterol is made by your body. It also comes from food you eat. High cholesterol means that you have too much of the fat in your blood. This raises your risk of a heart attack and stroke. LDL and HDL are part of your total cholesterol. LDL is the \"bad\" cholesterol. High LDL can raise your risk for heart disease, heart attack, and stroke. HDL is the \"good\" cholesterol. It helps clear bad cholesterol from the body. High HDL is linked with a lower risk of heart disease, heart attack, and stroke. Your cholesterol levels help your doctor find out your risk for having a heart attack or stroke. You and your doctor can talk about whether you need to lower your risk and what treatment is best for you. A heart-healthy lifestyle along with medicines can help lower your cholesterol and your risk. The way you choose to lower your risk will depend on how high your risk is for heart attack and stroke. It will also depend on how you feel about taking medicines. Follow-up care is a key part of your treatment and safety. Be sure to make and go to all appointments, and call your doctor if you are having problems. It's also a good idea to know your test results and keep a list of the medicines you take. How can you care for yourself at home? · Eat a variety of foods every day. Good choices include fruits, vegetables, whole grains (like oatmeal), dried beans and peas, nuts and seeds, soy products (like tofu), and fat-free or low-fat dairy products. · Replace butter, margarine, and hydrogenated or partially hydrogenated oils with olive and canola oils. (Canola oil margarine without trans fat is fine.) · Replace red meat with fish, poultry, and soy protein (like tofu). · Limit processed and packaged foods like chips, crackers, and cookies. · Bake, broil, or steam foods. Don't olguin them. · Be physically active.  Get at least 30 minutes of exercise on most days of the week. Walking is a good choice. You also may want to do other activities, such as running, swimming, cycling, or playing tennis or team sports. · Stay at a healthy weight or lose weight by making the changes in eating and physical activity listed above. Losing just a small amount of weight, even 5 to 10 pounds, can reduce your risk for having a heart attack or stroke. · Do not smoke. When should you call for help? Watch closely for changes in your health, and be sure to contact your doctor if: 
? · You need help making lifestyle changes. ? · You have questions about your medicine. Where can you learn more? Go to http://shruthiTriposoashley.info/. Enter J092 in the search box to learn more about \"High Cholesterol: Care Instructions. \" Current as of: September 21, 2016 Content Version: 11.4 © 9541-0769 [employer] [facility]. Care instructions adapted under license by [employer] (which disclaims liability or warranty for this information). If you have questions about a medical condition or this instruction, always ask your healthcare professional. [address] any warranty or liability for your use of this information. Starting a Weight Loss Plan: Care Instructions Your Care Instructions If you are thinking about losing weight, it can be hard to know where to start. Your doctor can help you set up a weight loss plan that best meets your needs. You may want to take a class on nutrition or exercise, or join a weight loss support group. If you have questions about how to make changes to your eating or exercise habits, ask your doctor about seeing a registered dietitian or an exercise specialist. 
It can be a big challenge to lose weight. But you do not have to make huge changes at once. Make small changes, and stick with them. When those changes become habit, add a few more changes. If you do not think you are ready to make changes right now, try to pick a date in the future. Make an appointment to see your doctor to discuss whether the time is right for you to start a plan. Follow-up care is a key part of your treatment and safety. Be sure to make and go to all appointments, and call your doctor if you are having problems. It's also a good idea to know your test results and keep a list of the medicines you take. How can you care for yourself at home? · Set realistic goals. Many people expect to lose much more weight than is likely. A weight loss of 5% to 10% of your body weight may be enough to improve your health. · Get family and friends involved to provide support. Talk to them about why you are trying to lose weight, and ask them to help. They can help by participating in exercise and having meals with you, even if they may be eating something different. · Find what works best for you. If you do not have time or do not like to cook, a program that offers meal replacement bars or shakes may be better for you. Or if you like to prepare meals, finding a plan that includes daily menus and recipes may be best. 
· Ask your doctor about other health professionals who can help you achieve your weight loss goals. ¨ A dietitian can help you make healthy changes in your diet. ¨ An exercise specialist or  can help you develop a safe and effective exercise program. 
¨ A counselor or psychiatrist can help you cope with issues such as depression, anxiety, or family problems that can make it hard to focus on weight loss. · Consider joining a support group for people who are trying to lose weight. Your doctor can suggest groups in your area. Where can you learn more? Go to http://shruthi-ashley.info/. Enter I793 in the search box to learn more about \"Starting a Weight Loss Plan: Care Instructions. \" Current as of: October 13, 2016 Content Version: 11.4 © 4666-1253 [employer]. Care instructions adapted under license by [employer] (which disclaims liability or warranty for this information). If you have questions about a medical condition or this instruction, always ask your healthcare professional. [address] any warranty or liability for your use of this information. Introducing [address]! Dear Patricia Felix: 
Thank you for requesting a ActualMeds account. Our records indicate that you already have an active ActualMeds account. You can access your account anytime at [email or web address] Did you know that you can access your hospital and ER discharge instructions at any time in ActualMeds? You can also review all of your test results from your hospital stay or ER visit. Additional Information If you have questions, please visit the Frequently Asked Questions section of the ActualMeds website at [email or web address] Remember, ActualMeds is NOT to be used for urgent needs. For medical emergencies, dial 911. Now available from your iPhone and Android! Please provide this summary of care documentation to your next provider. Your primary care clinician is listed as Daniel Hickey. If you have any questions after today's visit, please call [phone].

## 2018-06-23 LAB
ALBUMIN SERPL-MCNC: 4.3 G/DL (ref 3.5–5.5)
ALBUMIN/GLOB SERPL: 1.4 {RATIO} (ref 1.2–2.2)
ALP SERPL-CCNC: 69 IU/L (ref 39–117)
ALT SERPL-CCNC: 21 IU/L (ref 0–32)
AST SERPL-CCNC: 22 IU/L (ref 0–40)
BILIRUB SERPL-MCNC: 0.3 MG/DL (ref 0–1.2)
BUN SERPL-MCNC: 14 MG/DL (ref 6–24)
BUN/CREAT SERPL: 19 (ref 9–23)
CALCIUM SERPL-MCNC: 9.2 MG/DL (ref 8.7–10.2)
CHLORIDE SERPL-SCNC: 100 MMOL/L (ref 96–106)
CHOLEST SERPL-MCNC: 240 MG/DL (ref 100–199)
CO2 SERPL-SCNC: 26 MMOL/L (ref 20–29)
CREAT SERPL-MCNC: 0.74 MG/DL (ref 0.57–1)
GFR SERPLBLD CREATININE-BSD FMLA CKD-EPI: 108 ML/MIN/1.73
GFR SERPLBLD CREATININE-BSD FMLA CKD-EPI: 94 ML/MIN/1.73
GLOBULIN SER CALC-MCNC: 3 G/DL (ref 1.5–4.5)
GLUCOSE SERPL-MCNC: 93 MG/DL (ref 65–99)
HDLC SERPL-MCNC: 50 MG/DL
INTERPRETATION, 910389: NORMAL
LDLC SERPL CALC-MCNC: 148 MG/DL (ref 0–99)
POTASSIUM SERPL-SCNC: 4.1 MMOL/L (ref 3.5–5.2)
PROT SERPL-MCNC: 7.3 G/DL (ref 6–8.5)
SODIUM SERPL-SCNC: 140 MMOL/L (ref 134–144)
TRIGL SERPL-MCNC: 212 MG/DL (ref 0–149)
VLDLC SERPL CALC-MCNC: 42 MG/DL (ref 5–40)

## 2018-06-24 NOTE — PROGRESS NOTES
Subjective:     Dyllan Fall is a 47 y.o. female who presents for follow up of prediabetes, hypertension, hyperlipidemia and obesity. Diet and Lifestyle: generally follows a low fat low cholesterol diet, generally follows a low sodium diet, does not rigorously follow a diabetic diet, exercises sporadically, nonsmoker  Home BP Monitoring: is not measured at home    Cardiovascular ROS: taking medications as instructed, no medication side effects noted, no TIA's, no chest pain on exertion, no dyspnea on exertion, no swelling of ankles, no orthostatic dizziness or lightheadedness. New concerns: reports continued low back pain radiating into right hip. Notes pain is decreased but still present. Initially medication provided excellent relief, less relief after the first week.      Patient Active Problem List   Diagnosis Code    Essential hypertension with goal blood pressure less than 130/80 I10    Prediabetes R73.03    Obesity, Class I, BMI 30-34.9 E66.9     Allergies   Allergen Reactions    Pcn [Penicillins] Rash    Pcn [Penicillins] Rash and Swelling    Percocet [Oxycodone-Acetaminophen] Other (comments)     hypotension    Sulfa (Sulfonamide Antibiotics) Hives    Sulfa (Sulfonamide Antibiotics) Rash and Swelling     Past Medical History:   Diagnosis Date    Fibroid tumor      Past Surgical History:   Procedure Laterality Date    HX GYN      removal of fibroid tumors    HX HYSTERECTOMY      HX MENISCUS REPAIR      9/2015    HX PARTIAL HYSTERECTOMY      HX TUBAL LIGATION      HX TUBAL LIGATION      1990     Family History   Problem Relation Age of Onset    Cancer Mother     Dementia Mother     No Known Problems Father      Social History   Substance Use Topics    Smoking status: Never Smoker    Smokeless tobacco: Never Used    Alcohol use No        Lab Results  Component Value Date/Time   Hemoglobin A1c 5.7 (H) 03/28/2018 08:51 AM   Hemoglobin A1c 6.2 (H) 07/07/2016 09:32 AM   Glucose 93 06/22/2018 09:45 AM   LDL, calculated 148 (H) 06/22/2018 09:45 AM   Creatinine 0.74 06/22/2018 09:45 AM      Lab Results  Component Value Date/Time   Cholesterol, total 240 (H) 06/22/2018 09:45 AM   HDL Cholesterol 50 06/22/2018 09:45 AM   LDL, calculated 148 (H) 06/22/2018 09:45 AM   Triglyceride 212 (H) 06/22/2018 09:45 AM     Lab Results  Component Value Date/Time   ALT (SGPT) 21 06/22/2018 09:45 AM   AST (SGOT) 22 06/22/2018 09:45 AM   Alk. phosphatase 69 06/22/2018 09:45 AM   Bilirubin, total 0.3 06/22/2018 09:45 AM   Albumin 4.3 06/22/2018 09:45 AM   Protein, total 7.3 06/22/2018 09:45 AM   PLATELET 552 61/11/3441 10:02 AM       Lab Results  Component Value Date/Time   GFR est non-AA 94 06/22/2018 09:45 AM   GFR est  06/22/2018 09:45 AM   Creatinine 0.74 06/22/2018 09:45 AM   BUN 14 06/22/2018 09:45 AM   Sodium 140 06/22/2018 09:45 AM   Potassium 4.1 06/22/2018 09:45 AM   Chloride 100 06/22/2018 09:45 AM   CO2 26 06/22/2018 09:45 AM        Review of Systems, additional:  A comprehensive review of systems was negative except for that written in the HPI. Objective:     Visit Vitals    /84    Pulse 84    Temp 97.8 °F (36.6 °C) (Oral)    Resp 17    Ht 5' 2.5\" (1.588 m)    Wt 191 lb 9.6 oz (86.9 kg)    SpO2 94%    BMI 34.49 kg/m2     Appearance: alert, well appearing, and in no distress, oriented to person, place, and time, overweight and well hydrated. General exam: CVS exam BP noted to be well controlled today in office, S1, S2 normal, no gallop, no murmur, chest clear, no JVD, no HSM, no edema, peripheral vascular exam both carotids normal upstroke without bruits, neurological exam alert, oriented, normal speech, no focal findings or movement disorder noted. Minimal tenderness right lumbar, no palpable mass or spasm. Negative straight leg raise. Assessment/Plan:     hypertension well controlled, hyperlipidemia needs improvement.   reviewed diet, exercise and weight control  copy of written low fat low cholesterol diet provided and reviewed  cardiovascular risk and specific lipid/LDL goals reviewed  reviewed medications and side effects in detail  reviewed potential future medication changes and side effects. Diagnoses and all orders for this visit:    1. Essential hypertension with goal blood pressure less than 130/80  At goal  Continue HCTZ  DASH diet  Daily walking  Weight loss  -     METABOLIC PANEL, COMPREHENSIVE    2. Prediabetes  Reviewed diet and exercise recommendations  Weight loss    3. Hypercholesteremia  TLC Diet:  -- Saturated fat <7% of calories, cholesterol <200 mg/day  -- Consider increased viscous (soluble) fiber (10-25 g/day) and plant stanols/sterols  (2g/day) as therapeutic options to enhance LDL lowering  Weight management  Increased physical activity.  -     LIPID PANEL    4. Acute midline low back pain with right-sided sciatica  Improved but not resolved  Refer for PT  -     REFERRAL TO PHYSICAL THERAPY    Other orders  -     CVD REPORT      Follow-up Disposition:  Return in about 3 months (around 9/22/2018) for f/u prediabetes. I have discussed the diagnosis with the patient and the intended plan as seen in the above orders. The patient has received an after-visit summary and questions were answered concerning future plans. Patient conveyed understanding of the plan at the time of the visit.     Spike Araujo NP

## 2018-06-25 NOTE — PROGRESS NOTES
Spoke with pt, verified , Advised pt per provider note, pt verbalized understanding and stated she will crack down on diet and try to loose weight within the next three months. Pt had no follow up questions.

## 2018-06-25 NOTE — PROGRESS NOTES
Cholesterol and triglycerides hasvenot really improved. Really need to be following a higher protein, lower carb diet low in saturated fat to get down. They are not quite a the level where medication is necessary, but getting close. Blood chemistry is good. Recommend recheck in 3 months fasting.

## 2018-07-05 DIAGNOSIS — I10 ESSENTIAL HYPERTENSION WITH GOAL BLOOD PRESSURE LESS THAN 130/80: ICD-10-CM

## 2018-07-05 RX ORDER — HYDROCHLOROTHIAZIDE 25 MG/1
TABLET ORAL
Qty: 30 TAB | Refills: 2 | Status: SHIPPED | OUTPATIENT
Start: 2018-07-05 | End: 2018-08-03 | Stop reason: SDUPTHER

## 2018-08-03 DIAGNOSIS — I10 ESSENTIAL HYPERTENSION WITH GOAL BLOOD PRESSURE LESS THAN 130/80: ICD-10-CM

## 2018-08-03 RX ORDER — HYDROCHLOROTHIAZIDE 25 MG/1
25 TABLET ORAL DAILY
Qty: 30 TAB | Refills: 2 | Status: SHIPPED | OUTPATIENT
Start: 2018-08-03 | End: 2018-08-08 | Stop reason: SDUPTHER

## 2018-08-08 DIAGNOSIS — I10 ESSENTIAL HYPERTENSION WITH GOAL BLOOD PRESSURE LESS THAN 130/80: ICD-10-CM

## 2018-08-08 NOTE — TELEPHONE ENCOUNTER
PT needs 90 day supply on the BP meds that was sent to the pharmacy.  Her ins will not pay for it unless 90 days

## 2018-08-09 RX ORDER — HYDROCHLOROTHIAZIDE 25 MG/1
25 TABLET ORAL DAILY
Qty: 90 TAB | Refills: 1 | Status: SHIPPED | OUTPATIENT
Start: 2018-08-09 | End: 2019-05-07 | Stop reason: SDUPTHER

## 2018-11-06 ENCOUNTER — OFFICE VISIT (OUTPATIENT)
Dept: FAMILY MEDICINE CLINIC | Age: 53
End: 2018-11-06

## 2018-11-06 VITALS
HEIGHT: 63 IN | BODY MASS INDEX: 32.78 KG/M2 | DIASTOLIC BLOOD PRESSURE: 83 MMHG | OXYGEN SATURATION: 96 % | RESPIRATION RATE: 17 BRPM | HEART RATE: 88 BPM | SYSTOLIC BLOOD PRESSURE: 116 MMHG | WEIGHT: 185 LBS | TEMPERATURE: 98.2 F

## 2018-11-06 DIAGNOSIS — R42 INTERMITTENT LIGHTHEADEDNESS: ICD-10-CM

## 2018-11-06 DIAGNOSIS — E78.5 HYPERLIPIDEMIA, UNSPECIFIED HYPERLIPIDEMIA TYPE: ICD-10-CM

## 2018-11-06 DIAGNOSIS — N95.1 MENOPAUSAL SYNDROME (HOT FLASHES): ICD-10-CM

## 2018-11-06 DIAGNOSIS — R73.03 PREDIABETES: ICD-10-CM

## 2018-11-06 DIAGNOSIS — R00.2 PALPITATIONS: Primary | ICD-10-CM

## 2018-11-06 DIAGNOSIS — Z23 ENCOUNTER FOR IMMUNIZATION: ICD-10-CM

## 2018-11-06 NOTE — PROGRESS NOTES
1. Have you been to the ER, urgent care clinic since your last visit? Hospitalized since your last visit? No    2. Have you seen or consulted any other health care providers outside of the [address] [place] [address] since your last visit? Include any pap smears or colon screening.  No     Chief Complaint   Patient presents with    Dizziness    Shoulder Pain     bilateral top of shoulders

## 2018-11-06 NOTE — PATIENT INSTRUCTIONS
Lightheadedness or Faintness: Care Instructions  Your Care Instructions  Lightheadedness is a feeling that you are about to faint or \"pass out. \" You do not feel as if you or your surroundings are moving. It is different from vertigo, which is the feeling that you or things around you are spinning or tilting. Lightheadedness usually goes away or gets better when you lie down. If lightheadedness gets worse, it can lead to a fainting spell. It is common to feel lightheaded from time to time. Lightheadedness usually is not caused by a serious problem. It often is caused by a short-lasting drop in blood pressure and blood flow to your head that occurs when you get up too quickly from a seated or lying position. Follow-up care is a key part of your treatment and safety. Be sure to make and go to all appointments, and call your doctor if you are having problems. It's also a good idea to know your test results and keep a list of the medicines you take. How can you care for yourself at home? · Lie down for 1 or 2 minutes when you feel lightheaded. After lying down, sit up slowly and remain sitting for 1 to 2 minutes before slowly standing up. · Avoid movements, positions, or activities that have made you lightheaded in the past.  · Get plenty of rest, especially if you have a cold or flu, which can cause lightheadedness. · Make sure you drink plenty of fluids, especially if you have a fever or have been sweating. · Do not drive or put yourself and others in danger while you feel lightheaded. When should you call for help? Call 911 anytime you think you may need emergency care. For example, call if:    · You have symptoms of a stroke. These may include:  ? Sudden numbness, tingling, weakness, or loss of movement in your face, arm, or leg, especially on only one side of your body. ? Sudden vision changes. ? Sudden trouble speaking. ? Sudden confusion or trouble understanding simple statements.   ? Sudden problems with walking or balance. ? A sudden, severe headache that is different from past headaches.     · You have symptoms of a heart attack. These may include:  ? Chest pain or pressure, or a strange feeling in the chest.  ? Sweating. ? Shortness of breath. ? Nausea or vomiting. ? Pain, pressure, or a strange feeling in the back, neck, jaw, or upper belly or in one or both shoulders or arms. ? Lightheadedness or sudden weakness. ? A fast or irregular heartbeat. After you call 911, the  may tell you to chew 1 adult-strength or 2 to 4 low-dose aspirin. Wait for an ambulance. Do not try to drive yourself.    Watch closely for changes in your health, and be sure to contact your doctor if:    · Your lightheadedness gets worse or does not get better with home care. Where can you learn more? Go to http://shruthi-ashley.info/. Enter I663 in the search box to learn more about \"Lightheadedness or Faintness: Care Instructions. \"  Current as of: November 20, 2017  Content Version: 11.8  © 4250-2312 [employer]. Care instructions adapted under license by [employer] (which disclaims liability or warranty for this information). If you have questions about a medical condition or this instruction, always ask your healthcare professional. [address] any warranty or liability for your use of this information. Hot Flashes During Menopause: Care Instructions  Your Care Instructions    A hot flash is a sudden feeling of intense body heat. Your head, neck, and chest may get red. Your heartbeat may speed up, and you may feel anxious or irritable. You may find that hot flashes occur more often in warm rooms or during stressful times. Hot flashes and other symptoms are a normal response to the hormone changes that occur before your menstrual cycle goes away completely (menopause). Hot flashes often get better and go away with time.  Making a few changes, such as exercising more, practicing meditation, quitting smoking, and drinking less alcohol, can help. Some women take hormone pills or other medicine to treat bothersome symptoms. Follow-up care is a key part of your treatment and safety. Be sure to make and go to all appointments, and call your doctor if you are having problems. It's also a good idea to know your test results and keep a list of the medicines you take. How can you care for yourself at home? · If you decide to take medicine to treat hot flashes, take it exactly as prescribed. Call your doctor if you think you are having a problem with your medicine. You will get more details on the specific medicine your doctor prescribes. · Learn to meditate. Sit quietly and focus on your breathing. Try to practice each day. Books, classes, and tapes can help you start a program.  · Wear natural fabrics, such as cotton and silk. Dress in layers so you can take off clothes as needed. · Keep the room temperature cool, or use a fan. You are more likely to have a hot flash when you are too warm than when you are cool. · Use fewer blankets when you sleep at night. · Drink cold fluids rather than hot ones. Limit your intake of caffeine and alcohol. · Eat smaller meals more often during the day so your body makes less heat than when digesting large amounts of food. Eat low-fat and high-fiber foods. · Do not smoke. Smoking can make hot flashes worse. If you need help quitting, talk to your doctor about stop-smoking programs and medicines. These can increase your chances of quitting for good. · Get at least 30 minutes of exercise on most days of the week. Walking is a good choice. You also may want to do other activities, such as running, swimming, cycling, or playing tennis or team sports. Where can you learn more? Go to http://shruthi-ashley.info/.   Enter F700 in the search box to learn more about \"Hot Flashes During Menopause: Care Instructions. \"  Current as of: May 15, 2018  Content Version: 11.8  © 9411-4814 [employer]wise, [facility]. Care instructions adapted under license by [employer] (which disclaims liability or warranty for this information). If you have questions about a medical condition or this instruction, always ask your healthcare professional. [address] any warranty or liability for your use of this information.

## 2018-11-07 LAB
ALBUMIN SERPL-MCNC: 4.7 G/DL (ref 3.5–5.5)
ALBUMIN/GLOB SERPL: 1.5 {RATIO} (ref 1.2–2.2)
ALP SERPL-CCNC: 75 IU/L (ref 39–117)
ALT SERPL-CCNC: 14 IU/L (ref 0–32)
AST SERPL-CCNC: 17 IU/L (ref 0–40)
BASOPHILS # BLD AUTO: 0 X10E3/UL (ref 0–0.2)
BASOPHILS NFR BLD AUTO: 1 %
BILIRUB SERPL-MCNC: 0.4 MG/DL (ref 0–1.2)
BUN SERPL-MCNC: 11 MG/DL (ref 6–24)
BUN/CREAT SERPL: 12 (ref 9–23)
CALCIUM SERPL-MCNC: 10.1 MG/DL (ref 8.7–10.2)
CHLORIDE SERPL-SCNC: 97 MMOL/L (ref 96–106)
CHOLEST SERPL-MCNC: 264 MG/DL (ref 100–199)
CO2 SERPL-SCNC: 26 MMOL/L (ref 20–29)
CREAT SERPL-MCNC: 0.89 MG/DL (ref 0.57–1)
EOSINOPHIL # BLD AUTO: 0.2 X10E3/UL (ref 0–0.4)
EOSINOPHIL NFR BLD AUTO: 4 %
ERYTHROCYTE [DISTWIDTH] IN BLOOD BY AUTOMATED COUNT: 14.5 % (ref 12.3–15.4)
EST. AVERAGE GLUCOSE BLD GHB EST-MCNC: 120 MG/DL
GLOBULIN SER CALC-MCNC: 3.2 G/DL (ref 1.5–4.5)
GLUCOSE SERPL-MCNC: 99 MG/DL (ref 65–99)
HBA1C MFR BLD: 5.8 % (ref 4.8–5.6)
HCT VFR BLD AUTO: 45.7 % (ref 34–46.6)
HDLC SERPL-MCNC: 57 MG/DL
HGB BLD-MCNC: 15.4 G/DL (ref 11.1–15.9)
IMM GRANULOCYTES # BLD: 0 X10E3/UL (ref 0–0.1)
IMM GRANULOCYTES NFR BLD: 0 %
INTERPRETATION, 910389: NORMAL
LDLC SERPL CALC-MCNC: 168 MG/DL (ref 0–99)
LYMPHOCYTES # BLD AUTO: 1.8 X10E3/UL (ref 0.7–3.1)
LYMPHOCYTES NFR BLD AUTO: 42 %
MCH RBC QN AUTO: 29.6 PG (ref 26.6–33)
MCHC RBC AUTO-ENTMCNC: 33.7 G/DL (ref 31.5–35.7)
MCV RBC AUTO: 88 FL (ref 79–97)
MONOCYTES # BLD AUTO: 0.3 X10E3/UL (ref 0.1–0.9)
MONOCYTES NFR BLD AUTO: 7 %
NEUTROPHILS # BLD AUTO: 2 X10E3/UL (ref 1.4–7)
NEUTROPHILS NFR BLD AUTO: 46 %
PLATELET # BLD AUTO: 303 X10E3/UL (ref 150–379)
POTASSIUM SERPL-SCNC: 4 MMOL/L (ref 3.5–5.2)
PROT SERPL-MCNC: 7.9 G/DL (ref 6–8.5)
RBC # BLD AUTO: 5.21 X10E6/UL (ref 3.77–5.28)
SODIUM SERPL-SCNC: 139 MMOL/L (ref 134–144)
TRIGL SERPL-MCNC: 197 MG/DL (ref 0–149)
TSH SERPL DL<=0.005 MIU/L-ACNC: 2.73 UIU/ML (ref 0.45–4.5)
VLDLC SERPL CALC-MCNC: 39 MG/DL (ref 5–40)
WBC # BLD AUTO: 4.3 X10E3/UL (ref 3.4–10.8)

## 2018-11-13 ENCOUNTER — OFFICE VISIT (OUTPATIENT)
Dept: FAMILY MEDICINE CLINIC | Age: 53
End: 2018-11-13

## 2018-11-13 VITALS
WEIGHT: 186.9 LBS | DIASTOLIC BLOOD PRESSURE: 83 MMHG | HEART RATE: 98 BPM | SYSTOLIC BLOOD PRESSURE: 123 MMHG | HEIGHT: 62 IN | BODY MASS INDEX: 34.39 KG/M2 | TEMPERATURE: 98.3 F | OXYGEN SATURATION: 96 %

## 2018-11-13 DIAGNOSIS — Z12.11 SCREEN FOR COLON CANCER: ICD-10-CM

## 2018-11-13 DIAGNOSIS — N95.1 PERIMENOPAUSAL VASOMOTOR SYMPTOMS: ICD-10-CM

## 2018-11-13 DIAGNOSIS — Z00.00 WELL ADULT EXAM: Primary | ICD-10-CM

## 2018-11-13 DIAGNOSIS — Z12.39 SCREENING FOR MALIGNANT NEOPLASM OF BREAST: ICD-10-CM

## 2018-11-13 RX ORDER — ESTRADIOL 0.05 MG/D
1 PATCH TRANSDERMAL
Qty: 12 PATCH | Refills: 1 | Status: SHIPPED | OUTPATIENT
Start: 2018-11-17 | End: 2019-05-05 | Stop reason: SDUPTHER

## 2018-11-13 NOTE — PROGRESS NOTES
Subjective:   47 y.o. female for well adult exam.   No LMP recorded. Patient has had a hysterectomy.       Patient Active Problem List   Diagnosis Code    Essential hypertension with goal blood pressure less than 130/80 I10    Prediabetes R73.03    Obesity, Class I, BMI 30-34.9 E66.9     Allergies   Allergen Reactions    Pcn [Penicillins] Rash    Pcn [Penicillins] Rash and Swelling    Percocet [Oxycodone-Acetaminophen] Other (comments)     hypotension    Sulfa (Sulfonamide Antibiotics) Hives    Sulfa (Sulfonamide Antibiotics) Rash and Swelling     Past Medical History:   Diagnosis Date    Fibroid tumor      Past Surgical History:   Procedure Laterality Date    HX GYN      removal of fibroid tumors    HX HYSTERECTOMY      HX MENISCUS REPAIR      9/2015    HX PARTIAL HYSTERECTOMY      HX TUBAL LIGATION      HX TUBAL LIGATION      1990     Family History   Problem Relation Age of Onset   Nely Cancer Mother     Dementia Mother     No Known Problems Father      Social History     Tobacco Use    Smoking status: Never Smoker    Smokeless tobacco: Never Used   Substance Use Topics    Alcohol use: No        Lab Results   Component Value Date/Time    WBC 4.3 11/06/2018 12:03 PM    HGB 15.4 11/06/2018 12:03 PM    HCT 45.7 11/06/2018 12:03 PM    PLATELET 901 03/87/1024 12:03 PM    MCV 88 11/06/2018 12:03 PM     Lab Results   Component Value Date/Time    Hemoglobin A1c 5.8 (H) 11/06/2018 12:03 PM    Hemoglobin A1c 5.7 (H) 03/28/2018 08:51 AM    Hemoglobin A1c 6.2 (H) 07/07/2016 09:32 AM    Glucose 99 11/06/2018 12:03 PM    LDL, calculated 168 (H) 11/06/2018 12:03 PM    Creatinine 0.89 11/06/2018 12:03 PM      Lab Results   Component Value Date/Time    Cholesterol, total 264 (H) 11/06/2018 12:03 PM    HDL Cholesterol 57 11/06/2018 12:03 PM    LDL, calculated 168 (H) 11/06/2018 12:03 PM    Triglyceride 197 (H) 11/06/2018 12:03 PM     Lab Results   Component Value Date/Time    ALT (SGPT) 14 11/06/2018 12:03 PM AST (SGOT) 17 11/06/2018 12:03 PM    Alk. phosphatase 75 11/06/2018 12:03 PM    Bilirubin, total 0.4 11/06/2018 12:03 PM    Albumin 4.7 11/06/2018 12:03 PM    Protein, total 7.9 11/06/2018 12:03 PM    PLATELET 975 23/14/7893 12:03 PM     Lab Results   Component Value Date/Time    GFR est non-AA 75 11/06/2018 12:03 PM    GFR est AA 87 11/06/2018 12:03 PM    Creatinine 0.89 11/06/2018 12:03 PM    BUN 11 11/06/2018 12:03 PM    Sodium 139 11/06/2018 12:03 PM    Potassium 4.0 11/06/2018 12:03 PM    Chloride 97 11/06/2018 12:03 PM    CO2 26 11/06/2018 12:03 PM     Lab Results   Component Value Date/Time    TSH 2.730 11/06/2018 12:03 PM    T3 Uptake 25 06/09/2016 10:02 AM    T4, Total 8.4 06/09/2016 10:02 AM         ROS: Feeling generally well. No TIA's or unusual headaches, no dysphagia. No prolonged cough. No dyspnea or chest pain on exertion. No abdominal pain, change in bowel habits, black or bloody stools. No urinary tract symptoms. No new or unusual musculoskeletal symptoms. Specific concerns today: continues to have severe hot flashes with associated palpitations and dizziness. Not having success losing weight. She has tried juicing and some other diets. She does not exercise    She has had her flu shot for this season. She has not scheduled her colonoscopy yet. Her mammogram is due this month. Objective: The patient appears well, alert, oriented x 3, in no distress. Visit Vitals  /83 (BP 1 Location: Right arm, BP Patient Position: Sitting)   Pulse 98   Temp 98.3 °F (36.8 °C) (Oral)   Ht 5' 2\" (1.575 m)   Wt 186 lb 14.4 oz (84.8 kg)   SpO2 96%   BMI 34.18 kg/m²     ENT normal.  Neck supple. No adenopathy or thyromegaly. HERMELINDA. Lungs are clear, good air entry, no wheezes, rhonchi or rales. S1 and S2 normal, no murmurs, regular rate and rhythm. Abdomen soft without tenderness, guarding, mass or organomegaly. Extremities show no edema, normal peripheral pulses.  Neurological is normal, no focal findings. Breast and Pelvic exams are deferred. Assessment/Plan:   Well Woman  lose weight, increase physical activity, limit alcohol consumption, follow low fat diet, follow low salt diet, routine labs ordered  Diagnoses and all orders for this visit:    1. Well adult exam    2. Screening for malignant neoplasm of breast  -     [facility] MAMMO BI SCREENING INCL CAD; Future    3. Screen for colon cancer  -     REFERRAL TO GASTROENTEROLOGY    4. Perimenopausal vasomotor symptoms  Add rx, monitor response  We discussed the increased cardiovascular risks associated with hormone replacement therapies  -     estradiol (CLIMARA) 0.05 mg/24 hr; 1 Patch by TransDERmal route Every Saturday. Follow-up Disposition:  Return in about 6 weeks (around 12/25/2018). I have discussed the diagnosis with the patient and the intended plan as seen in the above orders. The patient has received an after-visit summary and questions were answered concerning future plans. Patient conveyed understanding of the plan at the time of the visit.     Christiana Jacobo NP  11/13/18

## 2018-11-13 NOTE — PATIENT INSTRUCTIONS
Well Visit, Women 48 to 72: Care Instructions  Your Care Instructions    Physical exams can help you stay healthy. Your doctor has checked your overall health and may have suggested ways to take good care of yourself. He or she also may have recommended tests. At home, you can help prevent illness with healthy eating, regular exercise, and other steps. Follow-up care is a key part of your treatment and safety. Be sure to make and go to all appointments, and call your doctor if you are having problems. It's also a good idea to know your test results and keep a list of the medicines you take. How can you care for yourself at home? · Reach and stay at a healthy weight. This will lower your risk for many problems, such as obesity, diabetes, heart disease, and high blood pressure. · Get at least 30 minutes of exercise on most days of the week. Walking is a good choice. You also may want to do other activities, such as running, swimming, cycling, or playing tennis or team sports. · Do not smoke. Smoking can make health problems worse. If you need help quitting, talk to your doctor about stop-smoking programs and medicines. These can increase your chances of quitting for good. · Protect your skin from too much sun. When you're outdoors from 10 a.m. to 4 p.m., stay in the shade or cover up with clothing and a hat with a wide brim. Wear sunglasses that block UV rays. Even when it's cloudy, put broad-spectrum sunscreen (SPF 30 or higher) on any exposed skin. · See a dentist one or two times a year for checkups and to have your teeth cleaned. · Wear a seat belt in the car. · Limit alcohol to 1 drink a day. Too much alcohol can cause health problems. Follow your doctor's advice about when to have certain tests. These tests can spot problems early. · Cholesterol.  Your doctor will tell you how often to have this done based on your age, family history, or other things that can increase your risk for heart attack and stroke. · Blood pressure. Have your blood pressure checked during a routine doctor visit. Your doctor will tell you how often to check your blood pressure based on your age, your blood pressure results, and other factors. · Mammogram. Ask your doctor how often you should have a mammogram, which is an X-ray of your breasts. A mammogram can spot breast cancer before it can be felt and when it is easiest to treat. · Pap test and pelvic exam. Ask your doctor how often you should have a Pap test. You may not need to have a Pap test as often as you used to. · Vision. Have your eyes checked every year or two or as often as your doctor suggests. Some experts recommend that you have yearly exams for glaucoma and other age-related eye problems starting at age 48. · Hearing. Tell your doctor if you notice any change in your hearing. You can have tests to find out how well you hear. · Diabetes. Ask your doctor whether you should have tests for diabetes. · Colon cancer. You should begin tests for colon cancer at age 48. You may have one of several tests. Your doctor will tell you how often to have tests based on your age and risk. Risks include whether you already had a precancerous polyp removed from your colon or whether your parents, sisters and brothers, or children have had colon cancer. · Thyroid disease. Talk to your doctor about whether to have your thyroid checked as part of a regular physical exam. Women have an increased chance of a thyroid problem. · Osteoporosis. You should begin tests for bone density at age 72. If you are younger than 72, ask your doctor whether you have factors that may increase your risk for this disease. You may want to have this test before age 72. · Heart attack and stroke risk. At least every 4 to 6 years, you should have your risk for heart attack and stroke assessed.  Your doctor uses factors such as your age, blood pressure, cholesterol, and whether you smoke or have diabetes to show what your risk for a heart attack or stroke is over the next 10 years. When should you call for help? Watch closely for changes in your health, and be sure to contact your doctor if you have any problems or symptoms that concern you. Where can you learn more? Go to http://shruthi-ashley.info/. Enter N661 in the search box to learn more about \"Well Visit, Women 50 to 72: Care Instructions. \"  Current as of: March 29, 2018  Content Version: 11.8  © 3924-6278 [employer]. Care instructions adapted under license by [employer] (which disclaims liability or warranty for this information). If you have questions about a medical condition or this instruction, always ask your healthcare professional. [address] any warranty or liability for your use of this information. Colon Cancer Screening: Care Instructions  Your Care Instructions    Colorectal cancer occurs in the colon or rectum. That's the lower part of your digestive system. It is the second-leading cause of cancer deaths in the [place]. It often starts with small growths called polyps in the colon or rectum. Polyps are usually found with screening tests. Depending on the type of test, any polyps found may be removed during the tests. Colorectal cancer usually does not cause symptoms at first. But regular tests can help find it early, before it spreads and becomes harder to treat. Experts advise routine tests for colon cancer for people starting at age 48. And they advise people with a higher risk of colon cancer to get tested sooner. Talk with your doctor about when you should start testing. Discuss which tests you need. Follow-up care is a key part of your treatment and safety. Be sure to make and go to all appointments, and call your doctor if you are having problems. It's also a good idea to know your test results and keep a list of the medicines you take.   What are the main screening tests for colon cancer? · Stool tests. These include the fecal immunochemical test (FIT) and the fecal occult blood test (FOBT). These tests check stool samples for signs of cancer. If your test is positive, you will need to have a colonoscopy. · Sigmoidoscopy. This test lets your doctor look at the lining of your rectum and the lowest part of your colon. Your doctor uses a lighted tube called a sigmoidoscope. This test can't find cancers or polyps in the upper part of your colon. In some cases, polyps that are found can be removed. But if your doctor finds polyps, you will need to have a colonoscopy to check the upper part of your colon. · Colonoscopy. This test lets your doctor look at the lining of your rectum and your entire colon. The doctor uses a thin, flexible tool called a colonoscope. It can also be used to remove polyps or get a tissue sample (biopsy). What tests do you need? The following guidelines are for people age 48 and over who are not at high risk for colorectal cancer. You may have at least one of these tests as directed by your doctor. · Fecal immunochemical test (FIT) or fecal occult blood test (FOBT) every year  · Sigmoidoscopy every 5 years  · Colonoscopy every 10 years  If you are age 68 to 80, you can work with your doctor to decide if screening is a good option. If you are age 80 or older, your doctor will likely advise that screening is not helpful. Talk with your doctor about when you need to be tested. And discuss which tests are right for you. Your doctor may recommend earlier or more frequent testing if you:  · Have had colorectal cancer before. · Have had colon polyps. · Have symptoms of colorectal cancer. These include blood in your stool and changes in your bowel habits. · Have a parent, brother or sister, or child with colon polyps or colorectal cancer. · Have a bowel disease. This includes ulcerative colitis and Crohn's disease.   · Have a rare polyp syndrome that runs in families, such as familial adenomatous polyposis (FAP). · Have had radiation treatments to the belly or pelvis. When should you call for help? Watch closely for changes in your health, and be sure to contact your doctor if:    · You have any changes in your bowel habits.     · You have any problems. Where can you learn more? Go to http://shruthi-ashley.info/. Enter M541 in the search box to learn more about \"Colon Cancer Screening: Care Instructions. \"  Current as of: March 28, 2018  Content Version: 11.8  © 8168-7857 [employer]. Care instructions adapted under license by [facility] (which disclaims liability or warranty for this information). If you have questions about a medical condition or this instruction, always ask your healthcare professional. [address] any warranty or liability for your use of this information.

## 2018-11-13 NOTE — PROGRESS NOTES
Chief Complaint   Patient presents with    Complete Physical     1. Have you been to the ER, urgent care clinic since your last visit? Hospitalized since your last visit? No    2. Have you seen or consulted any other health care providers outside of the [address] [place] [address] since your last visit? Include any pap smears or colon screening.  No

## 2018-11-13 NOTE — PROGRESS NOTES
Discussed at f/u appt on 11/13/18  Cholesterol is elevated, LDL has worsened. You are close to needing to start medication for this, work on following the heart healthy diet more closely and weight loss, recheck in 3 months.    All other labs were normal.

## 2018-11-23 NOTE — PROGRESS NOTES
Juan Diego Park is a 47 y.o. female who presents with the following complaints:  Chief Complaint   Patient presents with    Dizziness    Shoulder Pain     bilateral top of shoulders       Subjective:    HPI:   C/o intermittent episodes of dizziness/lightheadedness for the past several weeks. Symptoms are associated with hot flashes and palpitations. Symptoms are abrupt in onset but brief, lasting for a few minutes. She has noted no pattern to the symptoms. Symptoms are not associated with position changes. Symptoms are sometimes relieved by eating or drinking. No associated headache, nausea, or vomiting. Appetite normal. She recalls having similar symptoms in the past prior to starting hormone replacement therapy. She was seeing a specialist for this, having pellets injected and using a compounded pill as well. This became too expensive so she discontinued the therapy. C/o bilateral upper back/shoulder pain, increased by sitting for longer periods of time. Can recall no injury or other trauma to the area. No UE weakness, numbness, or tingling. Has tried no medication or other treatment to relief the symptoms. No associated weakness or paresthesia of UE's.      Pertinent PMH/FH/SH:  Past Medical History:   Diagnosis Date    Fibroid tumor      Past Surgical History:   Procedure Laterality Date    HX GYN      removal of fibroid tumors    HX HYSTERECTOMY      HX MENISCUS REPAIR      9/2015    HX PARTIAL HYSTERECTOMY      HX TUBAL LIGATION      HX TUBAL LIGATION      1990     Family History   Problem Relation Age of Onset    Cancer Mother     Dementia Mother     No Known Problems Father      Social History     Socioeconomic History    Marital status:      Spouse name: Not on file    Number of children: Not on file    Years of education: Not on file    Highest education level: Not on file   Tobacco Use    Smoking status: Never Smoker    Smokeless tobacco: Never Used   Substance and Sexual Activity    Alcohol use: No    Drug use: No    Sexual activity: Yes   Social History Narrative    ** Merged History Encounter **          Advanced Directives: N      Patient Active Problem List    Diagnosis    Prediabetes    Obesity, Class I, BMI 30-34.9    Essential hypertension with goal blood pressure less than 130/80       Nurse notes were reviewed and are correct  Review of Systems - negative except as listed above in the HPI    Objective:     Vitals:    11/06/18 1126   BP: 116/83   Pulse: 88   Resp: 17   Temp: 98.2 °F (36.8 °C)   TempSrc: Oral   SpO2: 96%   Weight: 185 lb (83.9 kg)   Height: 5' 2.5\" (1.588 m)     Physical Examination: General appearance - alert, well appearing, and in no distress, oriented to person, place, and time, overweight and well hydrated  Mental status - anxious  Neck - supple, no significant adenopathy  Chest - clear to auscultation, no wheezes, rales or rhonchi, symmetric air entry  Heart - normal rate, regular rhythm, normal S1, S2, no murmurs, rubs, clicks or gallops, normal bilateral carotid upstroke without bruits, no JVD  Abdomen - soft, nontender, nondistended, no masses or organomegaly  bowel sounds normal  Neurological - alert, oriented, normal speech, no focal findings or movement disorder noted, normal muscle tone, no tremors, strength 5/5, Romberg sign negative, normal gait and station  Musculoskeletal - no joint tenderness, deformity or swelling, mild tenderness to palpation bilateral upper back over scapula  Extremities - no pedal edema noted  Skin - normal coloration and turgor, no rashes, no suspicious skin lesions noted    EKG- NSR, no ectopy, no ischemic changes    Assessment/ Plan:   Diagnoses and all orders for this visit:    1. Palpitations  ? Vasomotor r/t to menopausal symptoms  Normal EKG today  Check TSH  -     AMB POC EKG ROUTINE W/ 12 LEADS, INTER & REP  -     TSH 3RD GENERATION    2.  Encounter for immunization  -     INFLUENZA VIRUS VAC QUAD,SPLIT,PRESV FREE SYRINGE IM  -     ME IMMUNIZ ADMIN,1 SINGLE/COMB VAC/TOXOID    3. Prediabetes  Counseled on therapeutic lifestyle changes  Check A1C  -     HEMOGLOBIN A1C WITH EAG    4. Intermittent lightheadedness  Etiology unclear- menopausal symptoms vs anemia vs thyroid vs other  Check labls  -     CBC WITH AUTOMATED DIFF  -     METABOLIC PANEL, COMPREHENSIVE  -     TSH 3RD GENERATION    5. Menopausal syndrome (hot flashes)  Consider hormone replacement as symptoms are becoming severe    6. Hyperlipidemia, unspecified hyperlipidemia type  TLC Diet:  -- Saturated fat <7% of calories, cholesterol <200 mg/day  -- Consider increased viscous (soluble) fiber (10-25 g/day) and plant stanols/sterols  (2g/day) as therapeutic options to enhance LDL lowering  Weight management  Increased physical activity.  -     LIPID PANEL    Other orders  -     CVD REPORT       Follow-up Disposition:  Return in about 4 weeks (around 12/4/2018). I have discussed the diagnosis with the patient and the intended plan as seen in the above orders. The patient has received an after-visit summary and questions were answered concerning future plans. The patient verbalizes understanding. Medication Side Effects and Warnings were discussed with patient: yes  Patient Labs were reviewed and or requested: yes  Patient Past Records were reviewed and or requested: yes    Patient Instructions          Lightheadedness or Faintness: Care Instructions  Your Care Instructions  Lightheadedness is a feeling that you are about to faint or \"pass out. \" You do not feel as if you or your surroundings are moving. It is different from vertigo, which is the feeling that you or things around you are spinning or tilting. Lightheadedness usually goes away or gets better when you lie down. If lightheadedness gets worse, it can lead to a fainting spell. It is common to feel lightheaded from time to time.  Lightheadedness usually is not caused by a serious problem. It often is caused by a short-lasting drop in blood pressure and blood flow to your head that occurs when you get up too quickly from a seated or lying position. Follow-up care is a key part of your treatment and safety. Be sure to make and go to all appointments, and call your doctor if you are having problems. It's also a good idea to know your test results and keep a list of the medicines you take. How can you care for yourself at home? · Lie down for 1 or 2 minutes when you feel lightheaded. After lying down, sit up slowly and remain sitting for 1 to 2 minutes before slowly standing up. · Avoid movements, positions, or activities that have made you lightheaded in the past.  · Get plenty of rest, especially if you have a cold or flu, which can cause lightheadedness. · Make sure you drink plenty of fluids, especially if you have a fever or have been sweating. · Do not drive or put yourself and others in danger while you feel lightheaded. When should you call for help? Call 911 anytime you think you may need emergency care. For example, call if:    · You have symptoms of a stroke. These may include:  ? Sudden numbness, tingling, weakness, or loss of movement in your face, arm, or leg, especially on only one side of your body. ? Sudden vision changes. ? Sudden trouble speaking. ? Sudden confusion or trouble understanding simple statements. ? Sudden problems with walking or balance. ? A sudden, severe headache that is different from past headaches.     · You have symptoms of a heart attack. These may include:  ? Chest pain or pressure, or a strange feeling in the chest.  ? Sweating. ? Shortness of breath. ? Nausea or vomiting. ? Pain, pressure, or a strange feeling in the back, neck, jaw, or upper belly or in one or both shoulders or arms. ? Lightheadedness or sudden weakness. ? A fast or irregular heartbeat.   After you call 911, the  may tell you to chew 1 adult-strength or 2 to 4 low-dose aspirin. Wait for an ambulance. Do not try to drive yourself.    Watch closely for changes in your health, and be sure to contact your doctor if:    · Your lightheadedness gets worse or does not get better with home care. Where can you learn more? Go to http://shruthi-ashley.info/. Enter M528 in the search box to learn more about \"Lightheadedness or Faintness: Care Instructions. \"  Current as of: November 20, 2017  Content Version: 11.8  © 3777-2071 [employer]. Care instructions adapted under license by [employer] (which disclaims liability or warranty for this information). If you have questions about a medical condition or this instruction, always ask your healthcare professional. [address] any warranty or liability for your use of this information. Hot Flashes During Menopause: Care Instructions  Your Care Instructions    A hot flash is a sudden feeling of intense body heat. Your head, neck, and chest may get red. Your heartbeat may speed up, and you may feel anxious or irritable. You may find that hot flashes occur more often in warm rooms or during stressful times. Hot flashes and other symptoms are a normal response to the hormone changes that occur before your menstrual cycle goes away completely (menopause). Hot flashes often get better and go away with time. Making a few changes, such as exercising more, practicing meditation, quitting smoking, and drinking less alcohol, can help. Some women take hormone pills or other medicine to treat bothersome symptoms. Follow-up care is a key part of your treatment and safety. Be sure to make and go to all appointments, and call your doctor if you are having problems. It's also a good idea to know your test results and keep a list of the medicines you take. How can you care for yourself at home?   · If you decide to take medicine to treat hot flashes, take it exactly as prescribed. Call your doctor if you think you are having a problem with your medicine. You will get more details on the specific medicine your doctor prescribes. · Learn to meditate. Sit quietly and focus on your breathing. Try to practice each day. Books, classes, and tapes can help you start a program.  · Wear natural fabrics, such as cotton and silk. Dress in layers so you can take off clothes as needed. · Keep the room temperature cool, or use a fan. You are more likely to have a hot flash when you are too warm than when you are cool. · Use fewer blankets when you sleep at night. · Drink cold fluids rather than hot ones. Limit your intake of caffeine and alcohol. · Eat smaller meals more often during the day so your body makes less heat than when digesting large amounts of food. Eat low-fat and high-fiber foods. · Do not smoke. Smoking can make hot flashes worse. If you need help quitting, talk to your doctor about stop-smoking programs and medicines. These can increase your chances of quitting for good. · Get at least 30 minutes of exercise on most days of the week. Walking is a good choice. You also may want to do other activities, such as running, swimming, cycling, or playing tennis or team sports. Where can you learn more? Go to http://shruthi-ashley.info/. Enter F700 in the search box to learn more about \"Hot Flashes During Menopause: Care Instructions. \"  Current as of: May 15, 2018  Content Version: 11.8  © 7878-8151 [employer]. Care instructions adapted under license by [employer] (which disclaims liability or warranty for this information). If you have questions about a medical condition or this instruction, always ask your healthcare professional. [place] [address] any warranty or liability for your use of this information.             Luisito Shipman [facility]

## 2018-11-30 DIAGNOSIS — M54.41 ACUTE RIGHT-SIDED LOW BACK PAIN WITH RIGHT-SIDED SCIATICA: ICD-10-CM

## 2018-11-30 RX ORDER — CYCLOBENZAPRINE HCL 5 MG
TABLET ORAL
Qty: 20 TAB | Refills: 0 | Status: SHIPPED | OUTPATIENT
Start: 2018-11-30 | End: 2019-02-27 | Stop reason: SDUPTHER

## 2019-01-02 LAB — COLONOSCOPY, EXTERNAL: NORMAL

## 2019-02-27 ENCOUNTER — OFFICE VISIT (OUTPATIENT)
Dept: FAMILY MEDICINE CLINIC | Age: 54
End: 2019-02-27

## 2019-02-27 VITALS
OXYGEN SATURATION: 96 % | WEIGHT: 184.31 LBS | HEART RATE: 101 BPM | DIASTOLIC BLOOD PRESSURE: 90 MMHG | SYSTOLIC BLOOD PRESSURE: 132 MMHG | TEMPERATURE: 98.4 F | RESPIRATION RATE: 18 BRPM | HEIGHT: 62 IN | BODY MASS INDEX: 33.92 KG/M2

## 2019-02-27 DIAGNOSIS — M54.42 ACUTE LEFT-SIDED LOW BACK PAIN WITH LEFT-SIDED SCIATICA: Primary | ICD-10-CM

## 2019-02-27 DIAGNOSIS — G89.29 CHRONIC RIGHT-SIDED LOW BACK PAIN WITH RIGHT-SIDED SCIATICA: ICD-10-CM

## 2019-02-27 DIAGNOSIS — E66.9 OBESITY, CLASS I, BMI 30-34.9: ICD-10-CM

## 2019-02-27 DIAGNOSIS — I10 ESSENTIAL HYPERTENSION WITH GOAL BLOOD PRESSURE LESS THAN 130/80: ICD-10-CM

## 2019-02-27 DIAGNOSIS — M54.41 CHRONIC RIGHT-SIDED LOW BACK PAIN WITH RIGHT-SIDED SCIATICA: ICD-10-CM

## 2019-02-27 RX ORDER — DICLOFENAC SODIUM 50 MG/1
50 TABLET, DELAYED RELEASE ORAL 2 TIMES DAILY
Qty: 45 TAB | Refills: 1 | Status: SHIPPED | OUTPATIENT
Start: 2019-02-27 | End: 2019-03-09

## 2019-02-27 RX ORDER — CYCLOBENZAPRINE HCL 5 MG
5 TABLET ORAL
Qty: 30 TAB | Refills: 1 | Status: SHIPPED | OUTPATIENT
Start: 2019-02-27 | End: 2020-07-20 | Stop reason: SDUPTHER

## 2019-02-27 NOTE — PROGRESS NOTES
1. Have you been to the ER, urgent care clinic since your last visit? Hospitalized since your last visit? No 
 
2. Have you seen or consulted any other health care providers outside of the [address] [place] [address] since your last visit? Include any pap smears or colon screening. No 
Chief Complaint Patient presents with  Back Pain  Leg Pain Patient report back pain and spasms that started 3 months ago. She stated the pain radiated down her hip and left leg.

## 2019-02-27 NOTE — PROGRESS NOTES
Subjective: Ilene Sepulveda is a 47 y.o. female who complains of low back pain for 3 months, with radiation down the left leg, increasing in severity over the past 1-2 weeks. Precipitating factors: none recalled by the patient. Prior history of back problems: chronic right sided low back pain with sciatica. There is no numbness in the legs. Symptoms are worst: afternoon, evening. Alleviating factors identifiable by patient are none. Exacerbating factors identifiable by patient are sitting, going up stairs. She has been doing exercise given to her when she previously worked with PT for the right-sided pain, but finds they are not helping. Using heat TID without relief. Tried cyclobenzaprine with some relief. Her right-sided back pain improved when she bought new walking shoes. Patient Active Problem List  
Diagnosis Code  Essential hypertension with goal blood pressure less than 130/80 I10  Prediabetes R73.03  
 Obesity, Class I, BMI 30-34.9 E66.9 Allergies Allergen Reactions  Pcn [Penicillins] Rash  Pcn [Penicillins] Rash and Swelling  Percocet [Oxycodone-Acetaminophen] Other (comments)  
  hypotension  Sulfa (Sulfonamide Antibiotics) Hives  Sulfa (Sulfonamide Antibiotics) Rash and Swelling Past Medical History:  
Diagnosis Date  Fibroid tumor Past Surgical History:  
Procedure Laterality Date  HX GYN    
 removal of fibroid tumors  HX HYSTERECTOMY  HX MENISCUS REPAIR    
 9/2015  HX PARTIAL HYSTERECTOMY  HX TUBAL LIGATION    
 HX TUBAL LIGATION    
 1990 Family History Problem Relation Age of Onset  Cancer Mother  Dementia Mother  No Known Problems Father Social History Tobacco Use  Smoking status: Never Smoker  Smokeless tobacco: Never Used Substance Use Topics  Alcohol use: No  
  
 
Review of Systems A comprehensive review of systems was negative except for that written in the HPI. Objective: Visit Vitals /90 (BP 1 Location: Right arm, BP Patient Position: Sitting) Pulse (!) 101 Temp 98.4 °F (36.9 °C) (Oral) Resp 18 Ht 5' 2\" (1.575 m) Wt 184 lb 5 oz (83.6 kg) SpO2 96% BMI 33.71 kg/m² Patient appears to be in mild to moderate pain, antalgic gait noted. Lumbosacral spine area reveals local tenderness at midline and on the left side, no mass or palpable spasm. Painful and reduced LS ROM noted. Straight leg raise is positive on the left side. DTR's, motor strength and sensation normal.  Peripheral pulses are palpable. X-Ray: not indicated. Assessment/Plan: For acute pain, rest, intermittent application of heat (do not sleep on heating pad), analgesics and muscle relaxants are recommended. Discussed longer term treatment plan of prn NSAID's and discussed a home back care exercise program with flexion exercise routine. Proper lifting with avoidance of heavy lifting discussed. Consider Physical Therapy and XRay studies if not improving. Call or return to clinic prn if these symptoms worsen or fail to improve as anticipated. Diagnoses and all orders for this visit: 
 
1. Acute left-sided low back pain with left-sided sciatica Add rx Heat tid Refer for PT 
-     cyclobenzaprine (FLEXERIL) 5 mg tablet; Take 1 Tab by mouth nightly as needed for Muscle Spasm(s). -     diclofenac EC (VOLTAREN) 50 mg EC tablet; Take 1 Tab by mouth two (2) times a day for 10 days. May use q12 hours prn after 10 day. Take with food. -     REFERRAL TO PHYSICAL THERAPY 2. Chronic right-sided low back pain with right-sided sciatica PT 
 
3. Obesity, Class I, BMI 30-34.9 I have reviewed/discussed the above normal BMI with the patient. I have recommended the following interventions: dietary management education, guidance, and counseling, encourage exercise and monitor weight .    
Patient expresses interest in starting [facility] program. She previously completed orientation about 1 year ago but did not start the proram. Will have program PSR call patient with next steps. 4. Essential hypertension with goal blood pressure less than 130/80 Slightly above goal but in pain 
Continue current RX 
DASH diet Weight loss Follow-up Disposition: 
Return in about 6 weeks (around 4/10/2019) for back pain f/u. I have discussed the diagnosis with the patient and the intended plan as seen in the above orders. The patient has received an after-visit summary and questions were answered concerning future plans. Patient conveyed understanding of the plan at the time of the visit. Niurka Gaona NP 
02/27/19

## 2019-02-27 NOTE — PATIENT INSTRUCTIONS
Learning About Low Back Pain What is low back pain? Low back pain is pain that can occur anywhere below the ribs and above the legs. It is very common. Almost everyone has it at one time or another. Low back pain can be: 
Acute. This is new pain that can last a few days to a few weeksat the most a few months. Chronic. This pain can last for more than a few months. Sometimes it can last for years. What are some myths about low back pain? Here are some common myths about low back painand the facts: Myth: \"I need to rest my back when I have back pain. \" 
Fact: Staying active won't hurt you. It may help you get better faster. Myth: \"I need prescription pain medicine. \" 
Fact: It's best to try to let time and being active heal your back. Opioid pain medicinessuch as hydrocodone or oxycodoneusually don't work any better than over-the-counter medicines like ibuprofen or naproxen. And opioids can cause serious problems like addiction or overdose. Myth: \"I need a test like an X-ray or an MRI to diagnose my low back pain. \" 
Fact: Getting a test right away won't help you get better faster. And it could lead you down a treatment path you may not need, since most people get better on their own. What causes low back pain? In most cases, there isn't a clear cause. This can be frustrating, because your back hurts and there's no obvious reason. Your back pain can be caused by: 
Overuse or muscle strain. This can happen from playing sports, lifting heavy things, or not being physically fit. A herniated disc. This is a problem with the cushion between the bones in your back. Arthritis. With age, you may have changes in your bones that can narrow the space around your nerves. Other causes. In rare cases, the cause is a serious illness like an infection or cancer. But there are usually other symptoms too. What are the symptoms? Your symptoms depend on your body and the cause of your back pain.  You may feel: 
· Pain that's sharp or dull. It may be in one small area or over a broad area. But even bad pain doesn't mean that it's caused by something serious. · Leg pain, numbness, or tingling. When a nerve gets squeezedsuch as from a disc problem or arthritisyou may have symptoms in your leg or foot. You can even have leg symptoms from a back problem without having any pain in your back. How is low back pain diagnosed? A physical exam is the main way to diagnose low back pain. Your doctor may examine your back, check your nerves by testing your reflexes, and make sure that your muscles are strong. He or she also will ask questions about your back and overall health. Most people don't need any tests right away. Tests often don't show the reason for your pain. If your pain lasts more than 6 weeks or you have symptoms that your doctor is more concerned about, he or she may order tests. These may include an X-ray, a CT scan, or an MRI. In some cases, tests can help your doctor find a cause for your pain, especially for pain in one or both legs. How is low back pain treated? Most acute low back pain gets better on its own within a few weeks, no matter what the cause. Time and doing usual activities are all that most people need to feel better. Using heat or ice and taking over-the-counter pain medicine also can help while your body heals. If you aren't getting better on your own or your pain is very bad, your doctor may recommend: 
· Physical therapy. · Spinal manipulation, such as by a chiropractor. · Acupuncture. · Massage. · Injections of steroid medicine in your back (especially for pain that involves your legs). If you have chronic low back pain, treatment will help you understand and manage your pain. Treatment may include: 
· Staying active. This may include walking or doing back exercises. · Physical therapy. · Medicines. Some of these medicines are also used for other problems, like depression. · Pain management. Your doctor may have you see a pain specialist. 
· Counseling. Having chronic pain can be hard. It may help to talk to someone who can help you cope with your pain. Surgery isn't needed for most people. But it may help some types of low back pain. Follow-up care is a key part of your treatment and safety. Be sure to make and go to all appointments, and call your doctor if you are having problems. It's also a good idea to know your test results and keep a list of the medicines you take. When should you call for help? Call 911 anytime you think you may need emergency care. For example, call if: 
· You can't move a leg at all. Call your doctor now or seek immediate medical care if: 
· You have new or worse symptoms in your legs, belly, or buttocks. Symptoms may include: 
? Numbness or tingling. ? Weakness. ? Pain. · You lose bladder or bowel control. Watch closely for changes in your health, and be sure to contact your doctor if: · Along with the back pain, you have a fever, lose weight, or don't feel well. · You do not get better as expected. Where can you learn more? Go to http://shruthi-ashley.info/. Enter A007 in the search box to learn more about \"Learning About Low Back Pain. \" Current as of: September 20, 2018 Content Version: 11.9 © 3717-9232 [employer]. Care instructions adapted under license by [employer] (which disclaims liability or warranty for this information). If you have questions about a medical condition or this instruction, always ask your healthcare professional. [place] [address] any warranty or liability for your use of this information.

## 2019-03-20 ENCOUNTER — OFFICE VISIT (OUTPATIENT)
Dept: FAMILY MEDICINE CLINIC | Age: 54
End: 2019-03-20

## 2019-03-20 VITALS
DIASTOLIC BLOOD PRESSURE: 92 MMHG | WEIGHT: 185.9 LBS | HEIGHT: 62 IN | TEMPERATURE: 97.8 F | BODY MASS INDEX: 34.21 KG/M2 | HEART RATE: 82 BPM | SYSTOLIC BLOOD PRESSURE: 147 MMHG | RESPIRATION RATE: 19 BRPM | OXYGEN SATURATION: 97 %

## 2019-03-20 DIAGNOSIS — R73.9 BLOOD GLUCOSE ELEVATED: Primary | ICD-10-CM

## 2019-03-20 DIAGNOSIS — E78.2 MIXED HYPERLIPIDEMIA: ICD-10-CM

## 2019-03-20 DIAGNOSIS — I10 ESSENTIAL HYPERTENSION WITH GOAL BLOOD PRESSURE LESS THAN 130/80: ICD-10-CM

## 2019-03-20 DIAGNOSIS — E66.9 OBESITY (BMI 30-39.9): ICD-10-CM

## 2019-03-20 NOTE — PROGRESS NOTES
[facility] Progress Note: Initial Physician Visit Souleymane Harris is a 47 y.o. female who is here for medical screening for entering the [facility] Loss [facility].  
 
CC:  Obesity She had blood done last year and then decided she viky not participate She has now changed her  Mind Weight History I personally reviewed the Medical Screening [place] completed by patient and scanned into media section of chart. It includes duration of their obesity, maximum weight, goal weight and weight gain time line (timing), all of which give the context of their obesity AND a Family History of their obesity. Is their Weight Loss Goal entered in to Comments? 145 lbs Weight loss History I personally reviewed the Medical Screening [place] completed by the patient and scanned into media section of chart. It includes number of weight loss attempts, the weight loss program that patients was most successful using, and if they have any hx of anorectic medication use, including OTC supplements. This captures modifying factors. Significant Medical History Past Medical History:  
Diagnosis Date  Fibroid tumor I personally reviewed the Medical Screening [place] completed by the patient and scanned into media section of chart. This allows me to assess associated symptoms that are significant in the assessment of the patient's obesity and the patient's Past Medical History. Outpatient Medications Marked as Taking for the 3/20/19 encounter (Office Visit) with Vignesh Haji MD  
Medication Sig Dispense Refill  cyclobenzaprine (FLEXERIL) 5 mg tablet Take 1 Tab by mouth nightly as needed for Muscle Spasm(s). 30 Tab 1  
 hydroCHLOROthiazide (HYDRODIURIL) 25 mg tablet Take 1 Tab by mouth daily. 90 Tab 1 SLEEP: 
 
 
Significant Psychosocial History Has a doctor every diagnosed with Binge Eating Disorder, Bulemia or Anorexia? : no  
 
Compliance Upcoming Travel? no 
 
Social History Social History Tobacco Use  Smoking status: Never Smoker  Smokeless tobacco: Never Used Substance Use Topics  Alcohol use: No  
 
 
Exercise I personally reviewed the Medical Screening [place] completed by the patient and scanned into media section of chart. Review of Systems See HPI Objective Visit Vitals BP (!) 147/92 Pulse 82 Temp 97.8 °F (36.6 °C) (Oral) Resp 19 Ht 5' 2\" (1.575 m) Wt 185 lb 14.4 oz (84.3 kg) SpO2 97% BMI 34.00 kg/m² Weight Metrics 3/20/2019 3/20/2019 2/27/2019 11/13/2018 11/6/2018 6/22/2018 6/11/2018 Weight - 185 lb 14.4 oz 184 lb 5 oz 186 lb 14.4 oz 185 lb 191 lb 9.6 oz 190 lb 6.4 oz Neck Circ (inches) 13.5 - - - - - - Waist Measure Inches 40 - - - - - - Exercise Mins/week 0 - - - - - -  
BMI - 34 kg/m2 33.71 kg/m2 34.18 kg/m2 33.3 kg/m2 34.49 kg/m2 34.27 kg/m2 Labs: See HDL labs scanned into Media section Physical Exam 
 
Vital Signs Reviewed Weight Management Metrics Reviewed Appearance: well HEENT:  Scleral icterus?  no 
Neck:  Thyromegaly or nodules? no 
Mouth: Large tongue no Heart:  RRR Lungs:  clear Abdomen:   
 Hepatomegaly? no 
 Striae present? no 
Skin:  
 Acne?  no 
 Hirsutism? no 
 Skin tags? no 
 Acanthosis Nigricans?  no 
Ext:  Edema? no 
 
 
Assessment & Plan Encounter Diagnoses Name Primary?  Blood glucose elevated Yes  Obesity (BMI 30-39. 9)  Mixed hyperlipidemia  Essential hypertension with goal blood pressure less than 130/80 1. HDL labs reviewed w/ patient 2. EKG reviewed w/ patient: 
 Personally reviewed by me, NSR, No abnormalities, QTc = 418  sec (Upper limit is 440 for males & 460 for females) 3. Medication changes include: none Based on his history, labs and EKG, Gissel Craig is  a good candidate for the [employer] [facility] Program  
 
 25 min of the 45 minutes face to face time with Gato consisted of counseling & coordinating and/or discussing treatment plans in reference to her obesity The primary encounter diagnosis was Blood glucose elevated. Diagnoses of Obesity (BMI 30-39.9), Mixed hyperlipidemia, and Essential hypertension with goal blood pressure less than 130/80 were also pertinent to this visit.

## 2019-03-20 NOTE — PROGRESS NOTES
1. Have you been to the ER, urgent care clinic since your last visit? Hospitalized since your last visit? No 
 
2. Have you seen or consulted any other health care providers outside of the [facility] [place] since your last visit? Include any pap smears or colon screening. No  
 
Chief Complaint Patient presents with  Weight Management Body Weight: 185.9 Body Fat%: 41.6 Muscle Mass Weight: 35.1 Body Water Weight: 43.6 Basal Metabolic Rate: 6126 BMI: 34.00

## 2019-05-05 DIAGNOSIS — I10 ESSENTIAL HYPERTENSION WITH GOAL BLOOD PRESSURE LESS THAN 130/80: ICD-10-CM

## 2019-05-05 DIAGNOSIS — N95.1 PERIMENOPAUSAL VASOMOTOR SYMPTOMS: ICD-10-CM

## 2019-05-07 ENCOUNTER — CLINICAL SUPPORT (OUTPATIENT)
Dept: BARIATRICS/WEIGHT MGMT | Age: 54
End: 2019-05-07

## 2019-05-07 VITALS
SYSTOLIC BLOOD PRESSURE: 125 MMHG | HEIGHT: 62 IN | HEART RATE: 95 BPM | WEIGHT: 179 LBS | DIASTOLIC BLOOD PRESSURE: 90 MMHG | BODY MASS INDEX: 32.94 KG/M2

## 2019-05-07 DIAGNOSIS — E66.9 OBESITY, CLASS I, BMI 30-34.9: ICD-10-CM

## 2019-05-07 DIAGNOSIS — I10 ESSENTIAL HYPERTENSION WITH GOAL BLOOD PRESSURE LESS THAN 130/80: Primary | ICD-10-CM

## 2019-05-07 RX ORDER — ESTRADIOL 0.05 MG/D
PATCH TRANSDERMAL
Qty: 12 PATCH | Refills: 1 | Status: SHIPPED | OUTPATIENT
Start: 2019-05-07 | End: 2020-07-20

## 2019-05-07 RX ORDER — HYDROCHLOROTHIAZIDE 25 MG/1
TABLET ORAL
Qty: 30 TAB | Refills: 2 | Status: SHIPPED | OUTPATIENT
Start: 2019-05-07 | End: 2020-07-20 | Stop reason: SDUPTHER

## 2019-05-07 NOTE — PROGRESS NOTES
Progress Note: Weekly Education Class in the [facility] Weight Loss Program   Is there anything that you or the patient needs to let Dr. Crawford Naranjito know about? no  Over the past week, have you experienced any side-effects? no    Murtaza Blackmon is a 47 y.o. female who is enrolled in [facility]    Visit Vitals  /90   Pulse 95   Ht 5' 2\" (1.575 m)   Wt 179 lb (81.2 kg)   BMI 32.74 kg/m²     Weight Metrics 5/7/2019 3/20/2019 3/20/2019 2/27/2019 11/13/2018 11/6/2018 6/22/2018   Weight 179 lb - 185 lb 14.4 oz 184 lb 5 oz 186 lb 14.4 oz 185 lb 191 lb 9.6 oz   Neck Circ (inches) - 13.5 - - - - -   Waist Measure Inches 39.5 40 - - - - -   Exercise Mins/week - 0 - - - - -   BMI 32.74 kg/m2 - 34 kg/m2 33.71 kg/m2 34.18 kg/m2 33.3 kg/m2 34.49 kg/m2         Have you received any other medical care this week? no  If yes, where and for what? Have you had any change in your medications since your last visit? no  If yes what? Did you have any problems adhering to the program last week? no  If yes, please explain:       Eating Habits Over Last Week:  Did you take in 64 oz of non-caloric fluids?  no     Did you consume your 4 meal replacements each day? no     Mood: no info    Physical Activity Over the Past Week:    Aerobic exercise: 120 min  Resistance exercise: 0 workouts / week

## 2019-05-14 ENCOUNTER — CLINICAL SUPPORT (OUTPATIENT)
Dept: BARIATRICS/WEIGHT MGMT | Age: 54
End: 2019-05-14

## 2019-05-14 VITALS
HEART RATE: 100 BPM | BODY MASS INDEX: 32.72 KG/M2 | HEIGHT: 62 IN | WEIGHT: 177.8 LBS | DIASTOLIC BLOOD PRESSURE: 82 MMHG | SYSTOLIC BLOOD PRESSURE: 114 MMHG

## 2019-05-14 DIAGNOSIS — I10 ESSENTIAL HYPERTENSION WITH GOAL BLOOD PRESSURE LESS THAN 130/80: Primary | ICD-10-CM

## 2019-05-14 DIAGNOSIS — E66.9 OBESITY, CLASS I, BMI 30-34.9: ICD-10-CM

## 2019-05-14 NOTE — PROGRESS NOTES
Progress Note: Weekly Education Class in the [facility] Program   Is there anything that you or the patient needs to let Dr. Valarie Chang know about? no  Over the past week, have you experienced any side-effects? yes    Mary Monahan is a 47 y.o. female who is enrolled in [facility]    Visit Vitals  /82   Pulse 100   Ht 5' 2\" (1.575 m)   Wt 177 lb 12.8 oz (80.6 kg)   BMI 32.52 kg/m²     Weight Metrics 5/14/2019 5/7/2019 3/20/2019 3/20/2019 2/27/2019 11/13/2018 11/6/2018   Weight 177 lb 12.8 oz 179 lb - 185 lb 14.4 oz 184 lb 5 oz 186 lb 14.4 oz 185 lb   Neck Circ (inches) - - 13.5 - - - -   Waist Measure Inches 39 39.5 40 - - - -   Exercise Mins/week - - 0 - - - -   BMI 32.52 kg/m2 32.74 kg/m2 - 34 kg/m2 33.71 kg/m2 34.18 kg/m2 33.3 kg/m2         Have you received any other medical care this week? no  If yes, where and for what? Have you had any change in your medications since your last visit? no  If yes what?      Did you have any problems adhering to the program last week? no  If yes, please explain:       Eating Habits Over Last Week:  Did you take in 64 oz of non-caloric fluids? no 1-2 daily    Did you consume your 4 meal replacements each day? no 50 oz daily    Mood: tired    Physical Activity Over the Past Week:    Aerobic exercise: 80 min  Resistance exercise: 0 workouts / week

## 2019-05-21 ENCOUNTER — CLINICAL SUPPORT (OUTPATIENT)
Dept: BARIATRICS/WEIGHT MGMT | Age: 54
End: 2019-05-21

## 2019-05-21 DIAGNOSIS — E66.9 OBESITY, CLASS I, BMI 30-34.9: ICD-10-CM

## 2019-05-21 DIAGNOSIS — I10 ESSENTIAL HYPERTENSION WITH GOAL BLOOD PRESSURE LESS THAN 130/80: Primary | ICD-10-CM

## 2019-05-22 VITALS
BODY MASS INDEX: 33.2 KG/M2 | HEIGHT: 62 IN | WEIGHT: 180.4 LBS | DIASTOLIC BLOOD PRESSURE: 92 MMHG | HEART RATE: 97 BPM | SYSTOLIC BLOOD PRESSURE: 132 MMHG

## 2019-05-22 NOTE — PROGRESS NOTES
Progress Note: Weekly Education Class in the [facility] Loss Program   Is there anything that you or the patient needs to let Dr. Aviva Merritt know about? no  Over the past week, have you experienced any side-effects? Yes, headache, cramps    Obdulia Odell is a 47 y.o. female who is enrolled in [facility]    Visit Vitals  BP (!) 132/92   Pulse 97   Ht 5' 2\" (1.575 m)   Wt 180 lb 6.4 oz (81.8 kg)   BMI 33.00 kg/m²     Weight Metrics 5/22/2019 5/21/2019 5/14/2019 5/7/2019 3/20/2019 3/20/2019 2/27/2019   Weight - 180 lb 6.4 oz 177 lb 12.8 oz 179 lb - 185 lb 14.4 oz 184 lb 5 oz   Neck Circ (inches) - - - - 13.5 - -   Waist Measure Inches 39 - 39 39.5 40 - -   Exercise Mins/week - - - - 0 - -   BMI - 33 kg/m2 32.52 kg/m2 32.74 kg/m2 - 34 kg/m2 33.71 kg/m2         Have you received any other medical care this week? no  If yes, where and for what? Have you had any change in your medications since your last visit? no  If yes what? Did you have any problems adhering to the program last week? no  If yes, please explain:       Eating Habits Over Last Week:  Did you take in 64 oz of non-caloric fluids?  no     Did you consume your 4 meal replacements each day? no 1 daily with fruit  Oven fried ribs    Mood: no info    Physical Activity Over the Past Week:    Aerobic exercise: 105 min  Resistance exercise: 0 workouts / week

## 2019-05-23 NOTE — PROGRESS NOTES
Nurse note from patient's weekly VLCD / LCD / Maintenance class was reviewed. Pertinent medical concerns were:   none     BP Readings from Last 3 Encounters:   05/22/19 (!) 132/92   05/14/19 114/82   05/07/19 125/90       Weight Metrics 5/22/2019 5/21/2019 5/14/2019 5/7/2019 3/20/2019 3/20/2019 2/27/2019   Weight - 180 lb 6.4 oz 177 lb 12.8 oz 179 lb - 185 lb 14.4 oz 184 lb 5 oz   Neck Circ (inches) - - - - 13.5 - -   Waist Measure Inches 39 - 39 39.5 40 - -   Exercise Mins/week - - - - 0 - -   BMI - 33 kg/m2 32.52 kg/m2 32.74 kg/m2 - 34 kg/m2 33.71 kg/m2       Current Outpatient Medications   Medication Sig Dispense Refill    estradiol (CLIMARA) 0.05 mg/24 hr APPLY 1 PATCH TO SKIN EVERY SATURDAY 12 Patch 1    hydroCHLOROthiazide (HYDRODIURIL) 25 mg tablet TAKE 1 TABLET BY MOUTH EVERY DAY 30 Tab 2    cyclobenzaprine (FLEXERIL) 5 mg tablet Take 1 Tab by mouth nightly as needed for Muscle Spasm(s). 30 Tab 1    fluticasone (FLONASE) 50 mcg/actuation nasal spray 2 Sprays by Both Nostrils route daily. 1 Bottle 5    cetirizine (ZYRTEC) 10 mg tablet Take 1 Tab by mouth daily. 30 Tab 5    albuterol (PROVENTIL HFA, VENTOLIN HFA, PROAIR HFA) 90 mcg/actuation inhaler Take 2 Puffs by inhalation every four (4) hours as needed for Wheezing.  1 Inhaler 0    inhalational spacing device For use with inhaler 1 Device 0

## 2019-05-28 ENCOUNTER — CLINICAL SUPPORT (OUTPATIENT)
Dept: BARIATRICS/WEIGHT MGMT | Age: 54
End: 2019-05-28

## 2019-05-28 DIAGNOSIS — E66.9 OBESITY, CLASS I, BMI 30-34.9: ICD-10-CM

## 2019-05-28 DIAGNOSIS — I10 ESSENTIAL HYPERTENSION WITH GOAL BLOOD PRESSURE LESS THAN 130/80: Primary | ICD-10-CM

## 2019-05-29 VITALS
BODY MASS INDEX: 33.13 KG/M2 | DIASTOLIC BLOOD PRESSURE: 82 MMHG | HEIGHT: 62 IN | WEIGHT: 180 LBS | SYSTOLIC BLOOD PRESSURE: 121 MMHG

## 2019-05-29 NOTE — PROGRESS NOTES
Progress Note: Weekly Education Class in the [facility] Loss Program   Is there anything that you or the patient needs to let Dr. Shivani Neville know about? no  Over the past week, have you experienced any side-effects? Yes, constipation    Pratima Kessler is a 47 y.o. female who is enrolled in [facility]    Visit Vitals  /82   Ht 5' 2\" (1.575 m)   Wt 180 lb (81.6 kg)   BMI 32.92 kg/m²     Weight Metrics 5/28/2019 5/22/2019 5/21/2019 5/14/2019 5/7/2019 3/20/2019 3/20/2019   Weight 180 lb - 180 lb 6.4 oz 177 lb 12.8 oz 179 lb - 185 lb 14.4 oz   Neck Circ (inches) - - - - - 13.5 -   Waist Measure Inches - 39 - 39 39.5 40 -   Exercise Mins/week - - - - - 0 -   BMI 32.92 kg/m2 - 33 kg/m2 32.52 kg/m2 32.74 kg/m2 - 34 kg/m2         Have you received any other medical care this week? no  If yes, where and for what? Have you had any change in your medications since your last visit? no  If yes what? Did you have any problems adhering to the program last week? no  If yes, please explain:       Eating Habits Over Last Week:  Did you take in 64 oz of non-caloric fluids?  yes     Did you consume your 4 meal replacements each day? no     Mood: n/a     Physical Activity Over the Past Week:    Aerobic exercise: 120 min  Resistance exercise: 4 workouts / week

## 2019-05-30 NOTE — PROGRESS NOTES
Nurse note from patient's weekly VLCD / LCD / Maintenance class was reviewed. Pertinent medical concerns were:   none     BP Readings from Last 3 Encounters:   05/29/19 121/82   05/22/19 (!) 132/92   05/14/19 114/82       Weight Metrics 5/28/2019 5/22/2019 5/21/2019 5/14/2019 5/7/2019 3/20/2019 3/20/2019   Weight 180 lb - 180 lb 6.4 oz 177 lb 12.8 oz 179 lb - 185 lb 14.4 oz   Neck Circ (inches) - - - - - 13.5 -   Waist Measure Inches - 39 - 39 39.5 40 -   Exercise Mins/week - - - - - 0 -   BMI 32.92 kg/m2 - 33 kg/m2 32.52 kg/m2 32.74 kg/m2 - 34 kg/m2       Current Outpatient Medications   Medication Sig Dispense Refill    estradiol (CLIMARA) 0.05 mg/24 hr APPLY 1 PATCH TO SKIN EVERY SATURDAY 12 Patch 1    hydroCHLOROthiazide (HYDRODIURIL) 25 mg tablet TAKE 1 TABLET BY MOUTH EVERY DAY 30 Tab 2    cyclobenzaprine (FLEXERIL) 5 mg tablet Take 1 Tab by mouth nightly as needed for Muscle Spasm(s).  30 Tab 1

## 2019-06-04 ENCOUNTER — CLINICAL SUPPORT (OUTPATIENT)
Dept: BARIATRICS/WEIGHT MGMT | Age: 54
End: 2019-06-04

## 2019-06-04 DIAGNOSIS — I10 ESSENTIAL HYPERTENSION WITH GOAL BLOOD PRESSURE LESS THAN 130/80: Primary | ICD-10-CM

## 2019-06-04 DIAGNOSIS — E66.9 OBESITY, CLASS I, BMI 30-34.9: ICD-10-CM

## 2019-06-05 VITALS
DIASTOLIC BLOOD PRESSURE: 94 MMHG | HEART RATE: 82 BPM | BODY MASS INDEX: 33.84 KG/M2 | SYSTOLIC BLOOD PRESSURE: 133 MMHG | HEIGHT: 62 IN | WEIGHT: 183.9 LBS

## 2019-06-05 NOTE — PROGRESS NOTES
Progress Note: Weekly Education Class in the [facility] Loss Program   Is there anything that you or the patient needs to let Dr. Rashaad Obregon know about? no  Over the past week, have you experienced any side-effects? Yes, constipation/headache    Jayme Ozuna is a 47 y.o. female who is enrolled in [facility]    Visit Vitals  BP (!) 133/94   Pulse 82   Ht 5' 2\" (1.575 m)   Wt 183 lb 14.4 oz (83.4 kg)   BMI 33.64 kg/m²     Weight Metrics 6/4/2019 5/28/2019 5/22/2019 5/21/2019 5/14/2019 5/7/2019 3/20/2019   Weight 183 lb 14.4 oz 180 lb - 180 lb 6.4 oz 177 lb 12.8 oz 179 lb -   Neck Circ (inches) - - - - - - 13.5   Waist Measure Inches - - 39 - 39 39.5 40   Exercise Mins/week - - - - - - 0   BMI 33.64 kg/m2 32.92 kg/m2 - 33 kg/m2 32.52 kg/m2 32.74 kg/m2 -         Have you received any other medical care this week? no  If yes, where and for what? Have you had any change in your medications since your last visit? no  If yes what? Did you have any problems adhering to the program last week? no  If yes, please explain:       Eating Habits Over Last Week:  Did you take in 64 oz of non-caloric fluids?  no     Did you consume your 4 meal replacements each day? no     Mood: tired    Physical Activity Over the Past Week:    Aerobic exercise: 110 min  Resistance exercise: 4 workouts / week

## 2019-06-09 NOTE — PROGRESS NOTES
Nurse note from patient's weekly VLCD / LCD / Maintenance class was reviewed. Pertinent medical concerns were:   Try increasing walking. Elevate feet while on toilet     BP Readings from Last 3 Encounters:   06/05/19 (!) 133/94   05/29/19 121/82   05/22/19 (!) 132/92       Weight Metrics 6/4/2019 5/28/2019 5/22/2019 5/21/2019 5/14/2019 5/7/2019 3/20/2019   Weight 183 lb 14.4 oz 180 lb - 180 lb 6.4 oz 177 lb 12.8 oz 179 lb -   Neck Circ (inches) - - - - - - 13.5   Waist Measure Inches - - 39 - 39 39.5 40   Exercise Mins/week - - - - - - 0   BMI 33.64 kg/m2 32.92 kg/m2 - 33 kg/m2 32.52 kg/m2 32.74 kg/m2 -       Current Outpatient Medications   Medication Sig Dispense Refill    estradiol (CLIMARA) 0.05 mg/24 hr APPLY 1 PATCH TO SKIN EVERY SATURDAY 12 Patch 1    hydroCHLOROthiazide (HYDRODIURIL) 25 mg tablet TAKE 1 TABLET BY MOUTH EVERY DAY 30 Tab 2    cyclobenzaprine (FLEXERIL) 5 mg tablet Take 1 Tab by mouth nightly as needed for Muscle Spasm(s).  30 Tab 1

## 2019-06-12 ENCOUNTER — OFFICE VISIT (OUTPATIENT)
Dept: FAMILY MEDICINE CLINIC | Age: 54
End: 2019-06-12

## 2019-06-12 VITALS
WEIGHT: 174.2 LBS | SYSTOLIC BLOOD PRESSURE: 113 MMHG | TEMPERATURE: 98.5 F | DIASTOLIC BLOOD PRESSURE: 82 MMHG | RESPIRATION RATE: 18 BRPM | OXYGEN SATURATION: 96 % | HEART RATE: 107 BPM | BODY MASS INDEX: 32.06 KG/M2 | HEIGHT: 62 IN

## 2019-06-12 DIAGNOSIS — J02.0 STREP THROAT: Primary | ICD-10-CM

## 2019-06-12 DIAGNOSIS — J02.9 SORE THROAT: ICD-10-CM

## 2019-06-12 LAB
S PYO AG THROAT QL: POSITIVE
VALID INTERNAL CONTROL?: YES

## 2019-06-12 RX ORDER — AZITHROMYCIN 250 MG/1
TABLET, FILM COATED ORAL
Qty: 6 TAB | Refills: 0 | Status: SHIPPED | OUTPATIENT
Start: 2019-06-12 | End: 2019-06-17

## 2019-06-12 NOTE — PROGRESS NOTES
Chief Complaint   Patient presents with    Sore Throat     x5 days    Headache    Vomiting     she is a 47y.o. year old female who presents for evalution. She initially was here for a [facility] appt. She is however very sick since   4 days ago  She has sorethroat , vomitting and headache  Has only had water since Saturday        Reviewed PmHx, RxHx, FmHx, SocHx, AllgHx and updated and dated in the chart. Aspirin yes ____   No____ N/A____    Patient Active Problem List    Diagnosis    Prediabetes    Obesity, Class I, BMI 30-34.9    Essential hypertension with goal blood pressure less than 130/80       Nurse notes were reviewed and copied and are correct  Review of Systems - negative except as listed above in the HPI    Objective:     Vitals:    06/12/19 0818   BP: 113/82   Pulse: (!) 107   Resp: 18   Temp: 98.5 °F (36.9 °C)   TempSrc: Oral   SpO2: 96%   Weight: 174 lb 3.2 oz (79 kg)   Height: 5' 2\" (1.575 m)       Physical Examination: General appearance - alert, well appearing, and in no distress  Mental status - alert, oriented to person, place, and time  Mouth - erythematous and tonsils hypertrophied with exudate  Neck - adenopathy noted bilateral ant cerv  Chest - clear to auscultation, no wheezes, rales or rhonchi, symmetric air entry  Heart - normal rate, regular rhythm, normal S1, S2, no murmurs, rubs, clicks or gallops      Assessment/ Plan:   Diagnoses and all orders for this visit:    1. Strep throat  -     azithromycin (ZITHROMAX) 250 mg tablet; Take 2 tablets today, then take 1 tablet daily           ICD-10-CM ICD-9-CM    1. Strep throat J02.0 034.0 azithromycin (ZITHROMAX) 250 mg tablet       I have discussed the diagnosis with the patient and the intended plan as seen in the above orders. The patient has received an after-visit summary and questions were answered concerning future plans.      Medication Side Effects and Warnings were discussed with patient: yes  Patient Labs were reviewed and or requested: yes  Patient Past Records were reviewed and or requested: yes        There are no Patient Instructions on file for this visit.     The patient verbalizes understanding and agrees with the plan of care        Patient has the advanced directives booklet to review

## 2019-06-18 ENCOUNTER — CLINICAL SUPPORT (OUTPATIENT)
Dept: BARIATRICS/WEIGHT MGMT | Age: 54
End: 2019-06-18

## 2019-06-18 VITALS
BODY MASS INDEX: 32.83 KG/M2 | HEIGHT: 62 IN | DIASTOLIC BLOOD PRESSURE: 94 MMHG | WEIGHT: 178.4 LBS | HEART RATE: 94 BPM | SYSTOLIC BLOOD PRESSURE: 139 MMHG

## 2019-06-18 DIAGNOSIS — E66.9 OBESITY, CLASS I, BMI 30-34.9: ICD-10-CM

## 2019-06-18 DIAGNOSIS — I10 ESSENTIAL HYPERTENSION WITH GOAL BLOOD PRESSURE LESS THAN 130/80: Primary | ICD-10-CM

## 2019-06-18 NOTE — PROGRESS NOTES
Progress Note: Weekly Education Class in the [facility] Loss Program   Is there anything that you or the patient needs to let Dr. Sobeida Mays know about? no  Over the past week, have you experienced any side-effects? no    Pricilla Hernandez is a 47 y.o. female who is enrolled in [facility]    Visit Vitals  BP (!) 139/94   Pulse 94   Ht 5' 2\" (1.575 m)   Wt 178 lb 6.4 oz (80.9 kg)   BMI 32.63 kg/m²     Weight Metrics 6/18/2019 6/12/2019 6/4/2019 5/28/2019 5/22/2019 5/21/2019 5/14/2019   Weight 178 lb 6.4 oz 174 lb 3.2 oz 183 lb 14.4 oz 180 lb - 180 lb 6.4 oz 177 lb 12.8 oz   Neck Circ (inches) - - - - - - -   Waist Measure Inches - - - - 39 - 39   Exercise Mins/week - - - - - - -   BMI 32.63 kg/m2 31.86 kg/m2 33.64 kg/m2 32.92 kg/m2 - 33 kg/m2 32.52 kg/m2         Have you received any other medical care this week? Yes. If yes, where and for what? Strep throat    Have you had any change in your medications since your last visit? Yes If yes what? Azithromycin 250 mg for 5 days    Did you have any problems adhering to the program last week? no  If yes, please explain:       Eating Habits Over Last Week:  Did you take in 64 oz of non-caloric fluids?  yes no  Did you consume your 4 meal replacements each day? no     Mood: sick    Physical Activity Over the Past Week:    Aerobic exercise: 0 min  Resistance exercise: 0 workouts / week

## 2019-06-25 ENCOUNTER — CLINICAL SUPPORT (OUTPATIENT)
Dept: BARIATRICS/WEIGHT MGMT | Age: 54
End: 2019-06-25

## 2019-06-25 DIAGNOSIS — E66.9 OBESITY, CLASS I, BMI 30-34.9: ICD-10-CM

## 2019-06-25 DIAGNOSIS — I10 ESSENTIAL HYPERTENSION WITH GOAL BLOOD PRESSURE LESS THAN 130/80: Primary | ICD-10-CM

## 2019-06-27 NOTE — PROGRESS NOTES
Nurse note from patient's weekly VLCD / LCD / Maintenance class was reviewed. Pertinent medical concerns were:   none     BP Readings from Last 3 Encounters:   06/18/19 (!) 139/94   06/12/19 113/82   06/05/19 (!) 133/94       Weight Metrics 6/18/2019 6/12/2019 6/4/2019 5/28/2019 5/22/2019 5/21/2019 5/14/2019   Weight 178 lb 6.4 oz 174 lb 3.2 oz 183 lb 14.4 oz 180 lb - 180 lb 6.4 oz 177 lb 12.8 oz   Neck Circ (inches) - - - - - - -   Waist Measure Inches - - - - 39 - 39   Exercise Mins/week - - - - - - -   BMI 32.63 kg/m2 31.86 kg/m2 33.64 kg/m2 32.92 kg/m2 - 33 kg/m2 32.52 kg/m2       Current Outpatient Medications   Medication Sig Dispense Refill    estradiol (CLIMARA) 0.05 mg/24 hr APPLY 1 PATCH TO SKIN EVERY SATURDAY 12 Patch 1    hydroCHLOROthiazide (HYDRODIURIL) 25 mg tablet TAKE 1 TABLET BY MOUTH EVERY DAY 30 Tab 2    cyclobenzaprine (FLEXERIL) 5 mg tablet Take 1 Tab by mouth nightly as needed for Muscle Spasm(s).  30 Tab 1

## 2019-06-28 VITALS
SYSTOLIC BLOOD PRESSURE: 119 MMHG | BODY MASS INDEX: 32.35 KG/M2 | HEIGHT: 62 IN | WEIGHT: 175.8 LBS | DIASTOLIC BLOOD PRESSURE: 87 MMHG

## 2019-06-28 NOTE — PROGRESS NOTES
Progress Note: Weekly Education Class in the [facility] Loss [facility]   Is there anything that you or the patient needs to let Dr. Mendel Cons know about? no  Over the past week, have you experienced any side-effects? no    Baylee Dawn is a 47 y.o. female who is enrolled in [facility]    Visit Vitals  /87   Ht 5' 2\" (1.575 m)   Wt 175 lb 12.8 oz (79.7 kg)   BMI 32.15 kg/m²     Weight Metrics 6/25/2019 6/18/2019 6/12/2019 6/4/2019 5/28/2019 5/22/2019 5/21/2019   Weight 175 lb 12.8 oz 178 lb 6.4 oz 174 lb 3.2 oz 183 lb 14.4 oz 180 lb - 180 lb 6.4 oz   Neck Circ (inches) - - - - - - -   Waist Measure Inches - - - - - 39 -   Exercise Mins/week - - - - - - -   BMI 32.15 kg/m2 32.63 kg/m2 31.86 kg/m2 33.64 kg/m2 32.92 kg/m2 - 33 kg/m2         Have you received any other medical care this week? no  If yes, where and for what? Have you had any change in your medications since your last visit? no  If yes what? Did you have any problems adhering to the program last week? no  If yes, please explain:       Eating Habits Over Last Week:  Did you take in 64 oz of non-caloric fluids?  no     Did you consume your 4 meal replacements each day? no     Mood: tired    Physical Activity Over the Past Week:    Aerobic exercise: 80 min  Resistance exercise: 4 workouts / week

## 2019-07-01 ENCOUNTER — OFFICE VISIT (OUTPATIENT)
Dept: FAMILY MEDICINE CLINIC | Age: 54
End: 2019-07-01

## 2019-07-01 VITALS
HEART RATE: 98 BPM | HEIGHT: 62 IN | WEIGHT: 175.8 LBS | RESPIRATION RATE: 18 BRPM | OXYGEN SATURATION: 95 % | DIASTOLIC BLOOD PRESSURE: 79 MMHG | BODY MASS INDEX: 32.35 KG/M2 | TEMPERATURE: 98.1 F | SYSTOLIC BLOOD PRESSURE: 116 MMHG

## 2019-07-01 DIAGNOSIS — G56.02 CARPAL TUNNEL SYNDROME OF LEFT WRIST: Primary | ICD-10-CM

## 2019-07-01 RX ORDER — DICLOFENAC SODIUM 10 MG/G
2 GEL TOPICAL 4 TIMES DAILY
Qty: 1 EACH | Refills: 3 | Status: SHIPPED | OUTPATIENT
Start: 2019-07-01 | End: 2020-07-20

## 2019-07-01 NOTE — PROGRESS NOTES
Nurse note from patient's weekly VLCD / LCD / Maintenance class was reviewed. Pertinent medical concerns were:   none     BP Readings from Last 3 Encounters:   06/28/19 119/87   06/18/19 (!) 139/94   06/12/19 113/82       Weight Metrics 6/25/2019 6/18/2019 6/12/2019 6/4/2019 5/28/2019 5/22/2019 5/21/2019   Weight 175 lb 12.8 oz 178 lb 6.4 oz 174 lb 3.2 oz 183 lb 14.4 oz 180 lb - 180 lb 6.4 oz   Neck Circ (inches) - - - - - - -   Waist Measure Inches - - - - - 39 -   Exercise Mins/week - - - - - - -   BMI 32.15 kg/m2 32.63 kg/m2 31.86 kg/m2 33.64 kg/m2 32.92 kg/m2 - 33 kg/m2       Current Outpatient Medications   Medication Sig Dispense Refill    estradiol (CLIMARA) 0.05 mg/24 hr APPLY 1 PATCH TO SKIN EVERY SATURDAY 12 Patch 1    hydroCHLOROthiazide (HYDRODIURIL) 25 mg tablet TAKE 1 TABLET BY MOUTH EVERY DAY 30 Tab 2    cyclobenzaprine (FLEXERIL) 5 mg tablet Take 1 Tab by mouth nightly as needed for Muscle Spasm(s).  30 Tab 1

## 2019-07-01 NOTE — PROGRESS NOTES
1. Have you been to the ER, urgent care clinic since your last visit? Hospitalized since your last visit? No    2. Have you seen or consulted any other health care providers outside of the [address] [place] [address] since your last visit? Include any pap smears or colon screening. No     Chief Complaint   Patient presents with    Insect Bite     l.  Hand x 3days    Hand Pain    Hand Swelling     l. hand

## 2019-07-01 NOTE — PROGRESS NOTES
Chief Complaint   Patient presents with    Insect Bite     l. Hand x 3days    Hand Pain    Hand Swelling     l. hand     she is a 47y.o. year old female who presents for evalution. She woke up one morning 3 days ago with an itch in the left hand. The hand then swoll. She took benadrl and it got better but not gone  Now there is pain in the hand radiating into the arm into the wrist  The pain involves the thumb and the 2-3rd fingers. 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 She does not do a lot of typing  The day before this started she had to catch her mother who had a syncopal episode  It is made worse by holding the phone or the steering wheel  She is having trouble gripping things  The swelling is much better but the pain is not better now      Reviewed PmHx, RxHx, FmHx, SocHx, AllgHx and updated and dated in the chart. Aspirin yes ____   No____ N/A____    Patient Active Problem List    Diagnosis    Prediabetes    Obesity, Class I, BMI 30-34.9    Essential hypertension with goal blood pressure less than 130/80       Nurse notes were reviewed and copied and are correct  Review of Systems - negative except as listed above in the HPI    Objective:     Vitals:    07/01/19 0953   BP: 116/79   Pulse: 98   Resp: 18   Temp: 98.1 °F (36.7 °C)   TempSrc: Oral   SpO2: 95%   Weight: 175 lb 12.8 oz (79.7 kg)   Height: 5' 2\" (1.575 m)       Physical Examination: General appearance - alert, well appearing, and in no distress  Mental status - alert, oriented to person, place, and time  Neurological - alert, oriented, normal speech, no focal findings or movement disorder noted      Assessment/ Plan:   Diagnoses and all orders for this visit:    1. Carpal tunnel syndrome of left wrist     wrist splint  Ice parada   voltaren gel 4 times a day  rto 2 week or sooner if gets worse  Follow-up and Dispositions    · Return in about 2 weeks (around 7/15/2019).          ICD-10-CM ICD-9-CM 1. Carpal tunnel syndrome of left wrist G56.02 354.0        I have discussed the diagnosis with the patient and the intended plan as seen in the above orders. The patient has received an after-visit summary and questions were answered concerning future plans. Medication Side Effects and Warnings were discussed with patient: yes  Patient Labs were reviewed and or requested: yes  Patient Past Records were reviewed and or requested: yes        There are no Patient Instructions on file for this visit.     The patient verbalizes understanding and agrees with the plan of care        Patient has the advanced directives booklet to review

## 2019-07-02 ENCOUNTER — CLINICAL SUPPORT (OUTPATIENT)
Dept: BARIATRICS/WEIGHT MGMT | Age: 54
End: 2019-07-02

## 2019-07-02 DIAGNOSIS — I10 ESSENTIAL HYPERTENSION WITH GOAL BLOOD PRESSURE LESS THAN 130/80: Primary | ICD-10-CM

## 2019-07-02 DIAGNOSIS — E66.9 OBESITY, CLASS I, BMI 30-34.9: ICD-10-CM

## 2019-07-03 VITALS
HEIGHT: 62 IN | WEIGHT: 180.6 LBS | SYSTOLIC BLOOD PRESSURE: 124 MMHG | BODY MASS INDEX: 33.23 KG/M2 | DIASTOLIC BLOOD PRESSURE: 92 MMHG

## 2019-07-03 NOTE — PROGRESS NOTES
Progress Note: Weekly Education Class in the [facility]   Is there anything that you or the patient needs to let Dr. Sahil Mora know about? no  Over the past week, have you experienced any side-effects? no    Margarita Crenshaw is a 47 y.o. female who is enrolled in [facility]    Visit Vitals  BP (!) 124/92   Ht 5' 2\" (1.575 m)   Wt 180 lb 9.6 oz (81.9 kg)   BMI 33.03 kg/m²     Weight Metrics 7/2/2019 7/1/2019 6/25/2019 6/18/2019 6/12/2019 6/4/2019 5/28/2019   Weight 180 lb 9.6 oz 175 lb 12.8 oz 175 lb 12.8 oz 178 lb 6.4 oz 174 lb 3.2 oz 183 lb 14.4 oz 180 lb   Neck Circ (inches) - - - - - - -   Waist Measure Inches - - - - - - -   Exercise Mins/week - - - - - - -   BMI 33.03 kg/m2 32.15 kg/m2 32.15 kg/m2 32.63 kg/m2 31.86 kg/m2 33.64 kg/m2 32.92 kg/m2         Have you received any other medical care this week? no  If yes, where and for what? Have you had any change in your medications since your last visit? no  If yes what? Did you have any problems adhering to the program last week? no  If yes, please explain:       Eating Habits Over Last Week:  Did you take in 64 oz of non-caloric fluids?  no     Did you consume your 4 meal replacements each day? no     Mood: n/a    Physical Activity Over the Past Week:    Aerobic exercise: 180 min  Resistance exercise: 0 workouts / week

## 2019-07-08 NOTE — PROGRESS NOTES
Nurse note from patient's weekly VLCD / LCD / Maintenance class was reviewed. Pertinent medical concerns were:   none     BP Readings from Last 3 Encounters:   07/03/19 (!) 124/92   07/01/19 116/79   06/28/19 119/87       Weight Metrics 7/2/2019 7/1/2019 6/25/2019 6/18/2019 6/12/2019 6/4/2019 5/28/2019   Weight 180 lb 9.6 oz 175 lb 12.8 oz 175 lb 12.8 oz 178 lb 6.4 oz 174 lb 3.2 oz 183 lb 14.4 oz 180 lb   Neck Circ (inches) - - - - - - -   Waist Measure Inches - - - - - - -   Exercise Mins/week - - - - - - -   BMI 33.03 kg/m2 32.15 kg/m2 32.15 kg/m2 32.63 kg/m2 31.86 kg/m2 33.64 kg/m2 32.92 kg/m2       Current Outpatient Medications   Medication Sig Dispense Refill    diclofenac (VOLTAREN) 1 % gel Apply 2 g to affected area four (4) times daily. 1 Each 3    estradiol (CLIMARA) 0.05 mg/24 hr APPLY 1 PATCH TO SKIN EVERY SATURDAY 12 Patch 1    hydroCHLOROthiazide (HYDRODIURIL) 25 mg tablet TAKE 1 TABLET BY MOUTH EVERY DAY 30 Tab 2    cyclobenzaprine (FLEXERIL) 5 mg tablet Take 1 Tab by mouth nightly as needed for Muscle Spasm(s).  30 Tab 1

## 2019-07-09 ENCOUNTER — CLINICAL SUPPORT (OUTPATIENT)
Dept: BARIATRICS/WEIGHT MGMT | Age: 54
End: 2019-07-09

## 2019-07-09 DIAGNOSIS — R73.03 PREDIABETES: Primary | ICD-10-CM

## 2019-07-09 DIAGNOSIS — I10 ESSENTIAL HYPERTENSION WITH GOAL BLOOD PRESSURE LESS THAN 130/80: ICD-10-CM

## 2019-07-09 DIAGNOSIS — E66.9 OBESITY, CLASS I, BMI 30-34.9: ICD-10-CM

## 2019-07-10 ENCOUNTER — OFFICE VISIT (OUTPATIENT)
Dept: FAMILY MEDICINE CLINIC | Age: 54
End: 2019-07-10

## 2019-07-10 VITALS
BODY MASS INDEX: 33.53 KG/M2 | WEIGHT: 182.2 LBS | HEART RATE: 82 BPM | DIASTOLIC BLOOD PRESSURE: 80 MMHG | TEMPERATURE: 97.9 F | OXYGEN SATURATION: 98 % | HEIGHT: 62 IN | RESPIRATION RATE: 18 BRPM | SYSTOLIC BLOOD PRESSURE: 117 MMHG

## 2019-07-10 VITALS
HEIGHT: 62 IN | SYSTOLIC BLOOD PRESSURE: 117 MMHG | WEIGHT: 181.6 LBS | DIASTOLIC BLOOD PRESSURE: 83 MMHG | BODY MASS INDEX: 33.42 KG/M2

## 2019-07-10 DIAGNOSIS — R73.9 BLOOD GLUCOSE ELEVATED: ICD-10-CM

## 2019-07-10 DIAGNOSIS — E66.9 OBESITY (BMI 30-39.9): ICD-10-CM

## 2019-07-10 DIAGNOSIS — I10 ESSENTIAL HYPERTENSION WITH GOAL BLOOD PRESSURE LESS THAN 130/80: Primary | ICD-10-CM

## 2019-07-10 DIAGNOSIS — E78.2 MIXED HYPERLIPIDEMIA: ICD-10-CM

## 2019-07-10 DIAGNOSIS — R63.4 RAPID WEIGHT LOSS: ICD-10-CM

## 2019-07-10 NOTE — PROGRESS NOTES
Progress Note: Weekly Education Class in the [facility]   Is there anything that you or the patient needs to let Dr. Ana Cristina Couch know about? no  Over the past week, have you experienced any side-effects? no    Andrew Barrera is a 47 y.o. female who is enrolled in [facility]    Visit Vitals  /83   Ht 5' 2\" (1.575 m)   Wt 181 lb 9.6 oz (82.4 kg)   BMI 33.22 kg/m²     Weight Metrics 7/10/2019 7/10/2019 7/9/2019 7/2/2019 7/1/2019 6/25/2019 6/18/2019   Weight - 182 lb 3.2 oz 181 lb 9.6 oz 180 lb 9.6 oz 175 lb 12.8 oz 175 lb 12.8 oz 178 lb 6.4 oz   Neck Circ (inches) 14.5 - - - - - -   Waist Measure Inches 39.5 - - - - - -   Exercise Mins/week - - - - - - -   BMI - 33.32 kg/m2 33.22 kg/m2 33.03 kg/m2 32.15 kg/m2 32.15 kg/m2 32.63 kg/m2         Have you received any other medical care this week? no  If yes, where and for what? Have you had any change in your medications since your last visit? no  If yes what? Did you have any problems adhering to the program last week? no  If yes, please explain:       Eating Habits Over Last Week:  Did you take in 64 oz of non-caloric fluids?  no     Did you consume your 4 meal replacements each day? no     Mood: tired/upset    Physical Activity Over the Past Week:    Aerobic exercise: 60 min  Resistance exercise: 0 workouts / week

## 2019-07-10 NOTE — PROGRESS NOTES
[facility] Progress Note:   F/up Physician Visit    CC: Weight Management      Alondra Robbins is a 47 y.o. female who is here for her  f/up physician visit for the VLCD / LCD Program.  She has not been compliant w the products  Having 1 most days and one meal of foods  Last week she was out of town and was eating all regular foods           Weight Metrics 7/10/2019 7/10/2019 7/9/2019 7/2/2019 7/1/2019 6/25/2019 6/18/2019   Weight - 182 lb 3.2 oz 181 lb 9.6 oz 180 lb 9.6 oz 175 lb 12.8 oz 175 lb 12.8 oz 178 lb 6.4 oz   Neck Circ (inches) 14.5 - - - - - -   Waist Measure Inches 39.5 - - - - - -   Exercise Mins/week - - - - - - -   BMI - 33.32 kg/m2 33.22 kg/m2 33.03 kg/m2 32.15 kg/m2 32.15 kg/m2 32.63 kg/m2         Outpatient Medications Marked as Taking for the 7/10/19 encounter (Office Visit) with Darlin Gruber MD   Medication Sig Dispense Refill    diclofenac (VOLTAREN) 1 % gel Apply 2 g to affected area four (4) times daily. 1 Each 3    estradiol (CLIMARA) 0.05 mg/24 hr APPLY 1 PATCH TO SKIN EVERY SATURDAY 12 Patch 1    hydroCHLOROthiazide (HYDRODIURIL) 25 mg tablet TAKE 1 TABLET BY MOUTH EVERY DAY 30 Tab 2    cyclobenzaprine (FLEXERIL) 5 mg tablet Take 1 Tab by mouth nightly as needed for Muscle Spasm(s). 30 Tab 1         Participation   Did you attend clinic and class last week? yes    Review of Systems  Since your last visit, have you experienced any complications? no  If yes, please list:       Are you taking an appetite suppressant? no  If so, is there any Chest Pain, Palpitations or Dizziness? BP Readings from Last 3 Encounters:   07/10/19 117/80   07/10/19 117/83   07/03/19 (!) 124/92       SLEEP:    Have you received any other medical care this week? no  If yes, where and for what? Have you discontinued or changed any medicine or dose of your medicine since your last visit with Dr Rebecca Torres? no  If yes, where and for what?          Diet  How many ounces of calorie-free liquids did you consume each day?  50 oz    How many meal replacements did you take each day? 1    Did you have any problems adhering to the program?  no If yes, please explain:      Exercise  Aerobic exercise: walking 30-45 min four days a week  Resistance exercise: 0 workouts / week  Any discomfort?  no     If yes, where? Sleep: not more than 6 hours    Objective  Visit Vitals  /80   Pulse 82   Temp 97.9 °F (36.6 °C) (Oral)   Resp 18   Ht 5' 2\" (1.575 m)   Wt 182 lb 3.2 oz (82.6 kg)   SpO2 98%   BMI 33.32 kg/m²     No LMP recorded. Patient has had a hysterectomy. Physical Exam  Appearance: well,   Mental:A&O x 3, NAD  H:NC/AT,  EENT:   EOMI, PERRL, No scleral icterus  Neck: no bruit or JVD  Lung: clear, No W/R  ABD: soft, active, nontender  Ext:  no Edema  Neuro: nonfocal  Assessment / Plan    Encounter Diagnoses   Name Primary?  Essential hypertension with goal blood pressure less than 130/80 Yes    Blood glucose elevated     Obesity (BMI 30-39. 9)     Mixed hyperlipidemia     Rapid weight loss      Diagnoses and all orders for this visit:    1. Essential hypertension with goal blood pressure less than 552/19  -     METABOLIC PANEL, COMPREHENSIVE  -     METABOLIC PANEL, COMPREHENSIVE; Future    2. Blood glucose elevated  -     METABOLIC PANEL, COMPREHENSIVE  -     HEMOGLOBIN A1C WITH EAG  -     INSULIN    3. Obesity (BMI 30-39. 9)  Continue working on the vlcd  4. Mixed hyperlipidemia  -     LIPOPROTEIN (A); Future  -     APOLIPOPROTEIN B; Future    5. Rapid weight loss  -     MAGNESIUM  -     URIC ACID  -     INSULIN  -     URIC ACID; Future  -     MAGNESIUM; Future      1. Weight management reasonably well controlled   Progress was reviewed with patient    2.   Labs    Latest results reviewed with patient   Lab slip given to pt for f/up  labs    Document all of what she eats  Have at least 3 of the replacements  Recheck in 1 month w me    Over 20 minutes of the 30 minutes face to face time with Gato consisted of counseling & coordinating and/or discussing treatment plans in reference to her obesity The primary encounter diagnosis was Essential hypertension with goal blood pressure less than 130/80. Diagnoses of Blood glucose elevated, Obesity (BMI 30-39.9), Mixed hyperlipidemia, and Rapid weight loss were also pertinent to this visit.

## 2019-07-10 NOTE — PROGRESS NOTES
1. Have you been to the ER, urgent care clinic since your last visit? Hospitalized since your last visit? No    2. Have you seen or consulted any other health care providers outside of the [address] [place] [address] since your last visit? Include any pap smears or colon screening.  No     Chief Complaint   Patient presents with    Weight Management     MSWL     Body Weight: 182.2  Body Fat%: 40.0  Muscle Mass Weight: 34.4  Body Water Weight: 12.2  Basal Metabolic Rate: 9358  BMI: 33.32

## 2019-07-11 LAB
ALBUMIN SERPL-MCNC: 4 G/DL (ref 3.5–5.5)
ALBUMIN/GLOB SERPL: 1.3 {RATIO} (ref 1.2–2.2)
ALP SERPL-CCNC: 53 IU/L (ref 39–117)
ALT SERPL-CCNC: 10 IU/L (ref 0–32)
AST SERPL-CCNC: 11 IU/L (ref 0–40)
BILIRUB SERPL-MCNC: 0.3 MG/DL (ref 0–1.2)
BUN SERPL-MCNC: 10 MG/DL (ref 6–24)
BUN/CREAT SERPL: 14 (ref 9–23)
CALCIUM SERPL-MCNC: 10 MG/DL (ref 8.7–10.2)
CHLORIDE SERPL-SCNC: 101 MMOL/L (ref 96–106)
CO2 SERPL-SCNC: 26 MMOL/L (ref 20–29)
CREAT SERPL-MCNC: 0.74 MG/DL (ref 0.57–1)
EST. AVERAGE GLUCOSE BLD GHB EST-MCNC: 123 MG/DL
GLOBULIN SER CALC-MCNC: 3.1 G/DL (ref 1.5–4.5)
GLUCOSE SERPL-MCNC: 82 MG/DL (ref 65–99)
HBA1C MFR BLD: 5.9 % (ref 4.8–5.6)
INSULIN SERPL-ACNC: 7.7 UIU/ML (ref 2.6–24.9)
MAGNESIUM SERPL-MCNC: 2.3 MG/DL (ref 1.6–2.3)
POTASSIUM SERPL-SCNC: 4.4 MMOL/L (ref 3.5–5.2)
PROT SERPL-MCNC: 7.1 G/DL (ref 6–8.5)
SODIUM SERPL-SCNC: 142 MMOL/L (ref 134–144)
URATE SERPL-MCNC: 5.1 MG/DL (ref 2.5–7.1)

## 2019-07-15 NOTE — PROGRESS NOTES
Nurse note from patient's weekly VLCD / LCD / Maintenance class was reviewed. Pertinent medical concerns were:   none     BP Readings from Last 3 Encounters:   07/10/19 117/80   07/10/19 117/83   07/03/19 (!) 124/92       Weight Metrics 7/10/2019 7/10/2019 7/9/2019 7/2/2019 7/1/2019 6/25/2019 6/18/2019   Weight - 182 lb 3.2 oz 181 lb 9.6 oz 180 lb 9.6 oz 175 lb 12.8 oz 175 lb 12.8 oz 178 lb 6.4 oz   Neck Circ (inches) 14.5 - - - - - -   Waist Measure Inches 39.5 - - - - - -   Exercise Mins/week - - - - - - -   BMI - 33.32 kg/m2 33.22 kg/m2 33.03 kg/m2 32.15 kg/m2 32.15 kg/m2 32.63 kg/m2       Current Outpatient Medications   Medication Sig Dispense Refill    diclofenac (VOLTAREN) 1 % gel Apply 2 g to affected area four (4) times daily. 1 Each 3    estradiol (CLIMARA) 0.05 mg/24 hr APPLY 1 PATCH TO SKIN EVERY SATURDAY 12 Patch 1    hydroCHLOROthiazide (HYDRODIURIL) 25 mg tablet TAKE 1 TABLET BY MOUTH EVERY DAY 30 Tab 2    cyclobenzaprine (FLEXERIL) 5 mg tablet Take 1 Tab by mouth nightly as needed for Muscle Spasm(s).  30 Tab 1

## 2019-07-16 ENCOUNTER — CLINICAL SUPPORT (OUTPATIENT)
Dept: BARIATRICS/WEIGHT MGMT | Age: 54
End: 2019-07-16

## 2019-07-16 DIAGNOSIS — I10 ESSENTIAL HYPERTENSION WITH GOAL BLOOD PRESSURE LESS THAN 130/80: ICD-10-CM

## 2019-07-16 DIAGNOSIS — R73.03 PREDIABETES: Primary | ICD-10-CM

## 2019-07-16 DIAGNOSIS — E66.9 OBESITY, CLASS I, BMI 30-34.9: ICD-10-CM

## 2019-07-19 VITALS
HEIGHT: 62 IN | WEIGHT: 178.8 LBS | BODY MASS INDEX: 32.9 KG/M2 | SYSTOLIC BLOOD PRESSURE: 123 MMHG | DIASTOLIC BLOOD PRESSURE: 86 MMHG

## 2019-07-19 NOTE — PROGRESS NOTES
Progress Note: Weekly Education Class in the [facility]   Is there anything that you or the patient needs to let Dr. Kathryn Garcia know about? no  Over the past week, have you experienced any side-effects? no    Steffany Ramachandran is a 47 y.o. female who is enrolled in [facility]    Visit Vitals  /86   Ht 5' 2\" (1.575 m)   Wt 178 lb 12.8 oz (81.1 kg)   BMI 32.70 kg/m²     Weight Metrics 7/16/2019 7/10/2019 7/10/2019 7/9/2019 7/2/2019 7/1/2019 6/25/2019   Weight 178 lb 12.8 oz - 182 lb 3.2 oz 181 lb 9.6 oz 180 lb 9.6 oz 175 lb 12.8 oz 175 lb 12.8 oz   Neck Circ (inches) - 14.5 - - - - -   Waist Measure Inches - 39.5 - - - - -   Exercise Mins/week - - - - - - -   BMI 32.7 kg/m2 - 33.32 kg/m2 33.22 kg/m2 33.03 kg/m2 32.15 kg/m2 32.15 kg/m2         Have you received any other medical care this week? no  If yes, where and for what? Have you had any change in your medications since your last visit? no  If yes what? Did you have any problems adhering to the program last week? no  If yes, please explain:       Eating Habits Over Last Week:  Did you take in 64 oz of non-caloric fluids?  no     Did you consume your 4 meal replacements each day? no     Mood:     Physical Activity Over the Past Week:    Aerobic exercise: 45 min  Resistance exercise: 0 workouts / week

## 2019-07-21 NOTE — PROGRESS NOTES
The liver and kidney are normal  The blood sugar test is in the prediabetes zone, keep working on exercising and avoiding sweets and starches

## 2019-07-30 ENCOUNTER — CLINICAL SUPPORT (OUTPATIENT)
Dept: BARIATRICS/WEIGHT MGMT | Age: 54
End: 2019-07-30

## 2019-07-30 DIAGNOSIS — E66.9 OBESITY, CLASS I, BMI 30-34.9: ICD-10-CM

## 2019-07-30 DIAGNOSIS — I10 ESSENTIAL HYPERTENSION WITH GOAL BLOOD PRESSURE LESS THAN 130/80: ICD-10-CM

## 2019-07-30 DIAGNOSIS — R73.03 PREDIABETES: Primary | ICD-10-CM

## 2019-07-31 VITALS
DIASTOLIC BLOOD PRESSURE: 90 MMHG | HEIGHT: 62 IN | SYSTOLIC BLOOD PRESSURE: 132 MMHG | BODY MASS INDEX: 32.85 KG/M2 | WEIGHT: 178.5 LBS

## 2019-07-31 NOTE — PROGRESS NOTES
Progress Note: Weekly Education Class in the [facility]   Is there anything that you or the patient needs to let Dr. Colin Peguero know about? no  Over the past week, have you experienced any side-effects? no    Sloan King is a 47 y.o. female who is enrolled in [facility]    Visit Vitals  /90   Ht 5' 2\" (1.575 m)   Wt 178 lb 8 oz (81 kg)   BMI 32.65 kg/m²     Weight Metrics 7/30/2019 7/16/2019 7/10/2019 7/10/2019 7/9/2019 7/2/2019 7/1/2019   Weight 178 lb 8 oz 178 lb 12.8 oz - 182 lb 3.2 oz 181 lb 9.6 oz 180 lb 9.6 oz 175 lb 12.8 oz   Neck Circ (inches) - - 14.5 - - - -   Waist Measure Inches - - 39.5 - - - -   Exercise Mins/week - - - - - - -   BMI 32.65 kg/m2 32.7 kg/m2 - 33.32 kg/m2 33.22 kg/m2 33.03 kg/m2 32.15 kg/m2         Have you received any other medical care this week? no  If yes, where and for what? Have you had any change in your medications since your last visit? no  If yes what? Did you have any problems adhering to the program last week? no  If yes, please explain:       Eating Habits Over Last Week:  Did you take in 64 oz of non-caloric fluids?  no     Did you consume your 4 meal replacements each day? no     Mood:     Physical Activity Over the Past Week:    Aerobic exercise: 120 min  Resistance exercise: 0 workouts / week

## 2019-08-01 NOTE — PROGRESS NOTES
Nurse note from patient's weekly VLCD / LCD / Maintenance class was reviewed. Pertinent medical concerns were:   none     BP Readings from Last 3 Encounters:   07/31/19 132/90   07/19/19 123/86   07/10/19 117/80       Weight Metrics 7/30/2019 7/16/2019 7/10/2019 7/10/2019 7/9/2019 7/2/2019 7/1/2019   Weight 178 lb 8 oz 178 lb 12.8 oz - 182 lb 3.2 oz 181 lb 9.6 oz 180 lb 9.6 oz 175 lb 12.8 oz   Neck Circ (inches) - - 14.5 - - - -   Waist Measure Inches - - 39.5 - - - -   Exercise Mins/week - - - - - - -   BMI 32.65 kg/m2 32.7 kg/m2 - 33.32 kg/m2 33.22 kg/m2 33.03 kg/m2 32.15 kg/m2       Current Outpatient Medications   Medication Sig Dispense Refill    diclofenac (VOLTAREN) 1 % gel Apply 2 g to affected area four (4) times daily. 1 Each 3    estradiol (CLIMARA) 0.05 mg/24 hr APPLY 1 PATCH TO SKIN EVERY SATURDAY 12 Patch 1    hydroCHLOROthiazide (HYDRODIURIL) 25 mg tablet TAKE 1 TABLET BY MOUTH EVERY DAY 30 Tab 2    cyclobenzaprine (FLEXERIL) 5 mg tablet Take 1 Tab by mouth nightly as needed for Muscle Spasm(s).  30 Tab 1

## 2019-08-06 ENCOUNTER — CLINICAL SUPPORT (OUTPATIENT)
Dept: BARIATRICS/WEIGHT MGMT | Age: 54
End: 2019-08-06

## 2019-08-06 VITALS
BODY MASS INDEX: 33.59 KG/M2 | HEART RATE: 72 BPM | HEIGHT: 61 IN | WEIGHT: 177.9 LBS | DIASTOLIC BLOOD PRESSURE: 86 MMHG | SYSTOLIC BLOOD PRESSURE: 120 MMHG

## 2019-08-06 DIAGNOSIS — I10 ESSENTIAL HYPERTENSION WITH GOAL BLOOD PRESSURE LESS THAN 130/80: ICD-10-CM

## 2019-08-06 DIAGNOSIS — E66.9 OBESITY, CLASS I, BMI 30-34.9: ICD-10-CM

## 2019-08-06 DIAGNOSIS — R73.03 PREDIABETES: Primary | ICD-10-CM

## 2019-08-06 NOTE — PROGRESS NOTES
Progress Note: Weekly Education Class in the [facility]   Is there anything that you or the patient needs to let Dr Isabell Mccain know about? no  Over the past week, have you experienced any side-effects? no    Yoselin Wilson is a 47 y.o. female who is enrolled in [facility]    Visit Vitals  /86 (BP 1 Location: Left arm, BP Patient Position: Sitting)   Pulse 72   Ht 5' 1\" (1.549 m)   Wt 177 lb 14.4 oz (80.7 kg)   BMI 33.61 kg/m²     Weight Metrics 8/6/2019 7/30/2019 7/16/2019 7/10/2019 7/10/2019 7/9/2019 7/2/2019   Weight 177 lb 14.4 oz 178 lb 8 oz 178 lb 12.8 oz - 182 lb 3.2 oz 181 lb 9.6 oz 180 lb 9.6 oz   Neck Circ (inches) - - - 14.5 - - -   Waist Measure Inches - - - 39.5 - - -   Exercise Mins/week - - - - - - -   BMI 33.61 kg/m2 32.65 kg/m2 32.7 kg/m2 - 33.32 kg/m2 33.22 kg/m2 33.03 kg/m2         Have you received any other medical care this week? no  If yes, where and for what? Have you had any change in your medications since your last visit? no  If yes what? Did you have any problems adhering to the program last week? no  If yes, please explain:       Eating Habits Over Last Week:  Did you take in 64 oz of non-caloric fluids?  yes     Did you consume your 4 meal replacements each day? no       Physical Activity Over the Past Week:    Aerobic exercise: 205 min  Resistance exercise: 0 workouts / week

## 2019-08-07 LAB
ALBUMIN SERPL-MCNC: 4.1 G/DL (ref 3.5–5.5)
ALBUMIN/GLOB SERPL: 1.3 {RATIO} (ref 1.2–2.2)
ALP SERPL-CCNC: 61 IU/L (ref 39–117)
ALT SERPL-CCNC: 12 IU/L (ref 0–32)
APO B SERPL-MCNC: 119 MG/DL
AST SERPL-CCNC: 18 IU/L (ref 0–40)
BILIRUB SERPL-MCNC: 0.3 MG/DL (ref 0–1.2)
BUN SERPL-MCNC: 9 MG/DL (ref 6–24)
BUN/CREAT SERPL: 11 (ref 9–23)
CALCIUM SERPL-MCNC: 9.6 MG/DL (ref 8.7–10.2)
CHLORIDE SERPL-SCNC: 99 MMOL/L (ref 96–106)
CO2 SERPL-SCNC: 27 MMOL/L (ref 20–29)
CREAT SERPL-MCNC: 0.81 MG/DL (ref 0.57–1)
GLOBULIN SER CALC-MCNC: 3.2 G/DL (ref 1.5–4.5)
GLUCOSE SERPL-MCNC: 97 MG/DL (ref 65–99)
LPA SERPL-SCNC: 86.7 NMOL/L
MAGNESIUM SERPL-MCNC: 2.1 MG/DL (ref 1.6–2.3)
POTASSIUM SERPL-SCNC: 3.8 MMOL/L (ref 3.5–5.2)
PROT SERPL-MCNC: 7.3 G/DL (ref 6–8.5)
SODIUM SERPL-SCNC: 137 MMOL/L (ref 134–144)
URATE SERPL-MCNC: 5.5 MG/DL (ref 2.5–7.1)

## 2019-08-13 ENCOUNTER — CLINICAL SUPPORT (OUTPATIENT)
Dept: BARIATRICS/WEIGHT MGMT | Age: 54
End: 2019-08-13

## 2019-08-13 DIAGNOSIS — E66.9 OBESITY, CLASS I, BMI 30-34.9: ICD-10-CM

## 2019-08-13 DIAGNOSIS — R73.03 PREDIABETES: Primary | ICD-10-CM

## 2019-08-13 DIAGNOSIS — I10 ESSENTIAL HYPERTENSION WITH GOAL BLOOD PRESSURE LESS THAN 130/80: ICD-10-CM

## 2019-08-14 VITALS
SYSTOLIC BLOOD PRESSURE: 121 MMHG | BODY MASS INDEX: 33.99 KG/M2 | WEIGHT: 180 LBS | DIASTOLIC BLOOD PRESSURE: 80 MMHG | HEIGHT: 61 IN

## 2019-08-14 NOTE — PROGRESS NOTES
Progress Note: Weekly Education Class in the [facility]   Is there anything that you or the patient needs to let Dr Elizabeth Gonzalez know about? no  Over the past week, have you experienced any side-effects? no    Ernestina Saleh is a 47 y.o. female who is enrolled in [facility]    Visit Vitals  /80 (BP 1 Location: Left arm, BP Patient Position: Sitting)   Ht 5' 1\" (1.549 m)   Wt 180 lb (81.6 kg)   BMI 34.01 kg/m²     Weight Metrics 8/14/2019 8/13/2019 8/6/2019 7/30/2019 7/16/2019 7/10/2019 7/10/2019   Weight - 180 lb 177 lb 14.4 oz 178 lb 8 oz 178 lb 12.8 oz - 182 lb 3.2 oz   Neck Circ (inches) - - - - - 14.5 -   Waist Measure Inches 38 - - - - 39.5 -   Exercise Mins/week - - - - - - -   BMI - 34.01 kg/m2 33.61 kg/m2 32.65 kg/m2 32.7 kg/m2 - 33.32 kg/m2         Have you received any other medical care this week? no  If yes, where and for what? Have you had any change in your medications since your last visit? no  If yes what? Did you have any problems adhering to the program last week? no  If yes, please explain:       Eating Habits Over Last Week:  Did you take in 64 oz of non-caloric fluids?  no     Did you consume your 4 meal replacements each day? no       Physical Activity Over the Past Week:    Aerobic exercise: 135 min  Resistance exercise: 0 workouts / week

## 2019-08-20 ENCOUNTER — CLINICAL SUPPORT (OUTPATIENT)
Dept: BARIATRICS/WEIGHT MGMT | Age: 54
End: 2019-08-20

## 2019-08-20 VITALS
HEIGHT: 61 IN | SYSTOLIC BLOOD PRESSURE: 125 MMHG | WEIGHT: 177.9 LBS | DIASTOLIC BLOOD PRESSURE: 86 MMHG | BODY MASS INDEX: 33.59 KG/M2

## 2019-08-20 DIAGNOSIS — R73.03 PREDIABETES: Primary | ICD-10-CM

## 2019-08-20 DIAGNOSIS — I10 ESSENTIAL HYPERTENSION WITH GOAL BLOOD PRESSURE LESS THAN 130/80: ICD-10-CM

## 2019-08-20 DIAGNOSIS — E66.9 OBESITY, CLASS I, BMI 30-34.9: ICD-10-CM

## 2019-08-20 NOTE — PROGRESS NOTES
You are overdo for the August monthly check up  The liver and kidney tests are normal  The cholesterol levels are higher than goal. We will continue to monitor , if it does not improve, medication will be needed to lower it

## 2019-08-20 NOTE — PROGRESS NOTES
Progress Note: Weekly Education Class in the [facility]   No Homework presented  Visit Vitals  /86 (BP 1 Location: Left arm, BP Patient Position: Sitting)   Ht 5' 1\" (1.549 m)   Wt 177 lb 14.4 oz (80.7 kg)   BMI 33.61 kg/m²     Weight Metrics 8/20/2019 8/14/2019 8/13/2019 8/6/2019 7/30/2019 7/16/2019 7/10/2019   Weight 177 lb 14.4 oz - 180 lb 177 lb 14.4 oz 178 lb 8 oz 178 lb 12.8 oz -   Neck Circ (inches) - - - - - - 14.5   Waist Measure Inches - 38 - - - - 39.5   Exercise Mins/week - - - - - - -   BMI 33.61 kg/m2 - 34.01 kg/m2 33.61 kg/m2 32.65 kg/m2 32.7 kg/m2 -

## 2019-08-25 NOTE — PROGRESS NOTES
Nurse note from patient's weekly VLCD / LCD / Maintenance class was reviewed. Pertinent medical concerns were:   none     BP Readings from Last 3 Encounters:   08/20/19 125/86   08/13/19 121/80   08/06/19 120/86       Weight Metrics 8/20/2019 8/14/2019 8/13/2019 8/6/2019 7/30/2019 7/16/2019 7/10/2019   Weight 177 lb 14.4 oz - 180 lb 177 lb 14.4 oz 178 lb 8 oz 178 lb 12.8 oz -   Neck Circ (inches) - - - - - - 14.5   Waist Measure Inches - 38 - - - - 39.5   Exercise Mins/week - - - - - - -   BMI 33.61 kg/m2 - 34.01 kg/m2 33.61 kg/m2 32.65 kg/m2 32.7 kg/m2 -       Current Outpatient Medications   Medication Sig Dispense Refill    diclofenac (VOLTAREN) 1 % gel Apply 2 g to affected area four (4) times daily. 1 Each 3    estradiol (CLIMARA) 0.05 mg/24 hr APPLY 1 PATCH TO SKIN EVERY SATURDAY 12 Patch 1    hydroCHLOROthiazide (HYDRODIURIL) 25 mg tablet TAKE 1 TABLET BY MOUTH EVERY DAY 30 Tab 2    cyclobenzaprine (FLEXERIL) 5 mg tablet Take 1 Tab by mouth nightly as needed for Muscle Spasm(s).  30 Tab 1

## 2019-08-27 ENCOUNTER — CLINICAL SUPPORT (OUTPATIENT)
Dept: BARIATRICS/WEIGHT MGMT | Age: 54
End: 2019-08-27

## 2019-08-27 DIAGNOSIS — I10 ESSENTIAL HYPERTENSION WITH GOAL BLOOD PRESSURE LESS THAN 130/80: ICD-10-CM

## 2019-08-27 DIAGNOSIS — R73.03 PREDIABETES: Primary | ICD-10-CM

## 2019-08-27 DIAGNOSIS — E66.9 OBESITY, CLASS I, BMI 30-34.9: ICD-10-CM

## 2019-08-28 VITALS
DIASTOLIC BLOOD PRESSURE: 87 MMHG | WEIGHT: 175.9 LBS | BODY MASS INDEX: 33.21 KG/M2 | SYSTOLIC BLOOD PRESSURE: 120 MMHG | HEIGHT: 61 IN

## 2019-08-28 NOTE — PROGRESS NOTES
Progress Note: Weekly Education Class in the [facility]   Is there anything that you or the patient needs to let Dr Cisneros Feeling know about? no  Over the past week, have you experienced any side-effects? no    Gabe Nj is a 47 y.o. female who is enrolled in [facility]    Visit Vitals  /87   Ht 5' 1\" (1.549 m)   Wt 175 lb 14.4 oz (79.8 kg)   BMI 33.24 kg/m²     Weight Metrics 8/27/2019 8/20/2019 8/14/2019 8/13/2019 8/6/2019 7/30/2019 7/16/2019   Weight 175 lb 14.4 oz 177 lb 14.4 oz - 180 lb 177 lb 14.4 oz 178 lb 8 oz 178 lb 12.8 oz   Neck Circ (inches) - - - - - - -   Waist Measure Inches - - 38 - - - -   Exercise Mins/week - - - - - - -   BMI 33.24 kg/m2 33.61 kg/m2 - 34.01 kg/m2 33.61 kg/m2 32.65 kg/m2 32.7 kg/m2         Have you received any other medical care this week? no  If yes, where and for what? Have you had any change in your medications since your last visit? no  If yes what? Did you have any problems adhering to the program last week? no  If yes, please explain:       Eating Habits Over Last Week:  Did you take in 64 oz of non-caloric fluids?  no     Did you consume your 4 meal replacements each day? no       Physical Activity Over the Past Week:    Aerobic exercise: 90 min  Resistance exercise: 50 min workouts / week

## 2019-08-29 ENCOUNTER — OFFICE VISIT (OUTPATIENT)
Dept: FAMILY MEDICINE CLINIC | Age: 54
End: 2019-08-29

## 2019-08-29 VITALS
SYSTOLIC BLOOD PRESSURE: 116 MMHG | HEART RATE: 83 BPM | TEMPERATURE: 98 F | RESPIRATION RATE: 16 BRPM | HEIGHT: 61 IN | WEIGHT: 174.7 LBS | OXYGEN SATURATION: 97 % | BODY MASS INDEX: 32.98 KG/M2 | DIASTOLIC BLOOD PRESSURE: 81 MMHG

## 2019-08-29 DIAGNOSIS — E78.2 MIXED HYPERLIPIDEMIA: ICD-10-CM

## 2019-08-29 DIAGNOSIS — I10 ESSENTIAL HYPERTENSION WITH GOAL BLOOD PRESSURE LESS THAN 130/80: Primary | ICD-10-CM

## 2019-08-29 DIAGNOSIS — E66.9 OBESITY (BMI 30-39.9): ICD-10-CM

## 2019-08-29 DIAGNOSIS — R73.9 BLOOD GLUCOSE ELEVATED: ICD-10-CM

## 2019-08-29 DIAGNOSIS — R63.4 RAPID WEIGHT LOSS: ICD-10-CM

## 2019-08-29 NOTE — PROGRESS NOTES
Nurse note from patient's weekly VLCD / LCD / Maintenance class was reviewed. Pertinent medical concerns were:   none     BP Readings from Last 3 Encounters:   08/29/19 116/81   08/28/19 120/87   08/20/19 125/86       Weight Metrics 8/29/2019 8/29/2019 8/27/2019 8/20/2019 8/14/2019 8/13/2019 8/6/2019   Weight - 174 lb 11.2 oz 175 lb 14.4 oz 177 lb 14.4 oz - 180 lb 177 lb 14.4 oz   Neck Circ (inches) - - - - - - -   Waist Measure Inches 36.5 - - - 38 - -   Exercise Mins/week - - - - - - -   BMI - 33.01 kg/m2 33.24 kg/m2 33.61 kg/m2 - 34.01 kg/m2 33.61 kg/m2       Current Outpatient Medications   Medication Sig Dispense Refill    diclofenac (VOLTAREN) 1 % gel Apply 2 g to affected area four (4) times daily. 1 Each 3    estradiol (CLIMARA) 0.05 mg/24 hr APPLY 1 PATCH TO SKIN EVERY SATURDAY 12 Patch 1    hydroCHLOROthiazide (HYDRODIURIL) 25 mg tablet TAKE 1 TABLET BY MOUTH EVERY DAY 30 Tab 2    cyclobenzaprine (FLEXERIL) 5 mg tablet Take 1 Tab by mouth nightly as needed for Muscle Spasm(s).  30 Tab 1

## 2019-08-29 NOTE — PROGRESS NOTES
Nurse note from patient's weekly VLCD / LCD / Maintenance class was reviewed. Pertinent medical concerns were:   none     BP Readings from Last 3 Encounters:   08/28/19 120/87   08/20/19 125/86   08/13/19 121/80       Weight Metrics 8/27/2019 8/20/2019 8/14/2019 8/13/2019 8/6/2019 7/30/2019 7/16/2019   Weight 175 lb 14.4 oz 177 lb 14.4 oz - 180 lb 177 lb 14.4 oz 178 lb 8 oz 178 lb 12.8 oz   Neck Circ (inches) - - - - - - -   Waist Measure Inches - - 38 - - - -   Exercise Mins/week - - - - - - -   BMI 33.24 kg/m2 33.61 kg/m2 - 34.01 kg/m2 33.61 kg/m2 32.65 kg/m2 32.7 kg/m2       Current Outpatient Medications   Medication Sig Dispense Refill    diclofenac (VOLTAREN) 1 % gel Apply 2 g to affected area four (4) times daily. 1 Each 3    estradiol (CLIMARA) 0.05 mg/24 hr APPLY 1 PATCH TO SKIN EVERY SATURDAY 12 Patch 1    hydroCHLOROthiazide (HYDRODIURIL) 25 mg tablet TAKE 1 TABLET BY MOUTH EVERY DAY 30 Tab 2    cyclobenzaprine (FLEXERIL) 5 mg tablet Take 1 Tab by mouth nightly as needed for Muscle Spasm(s).  30 Tab 1

## 2019-08-29 NOTE — PROGRESS NOTES
1. Have you been to the ER, urgent care clinic since your last visit? Hospitalized since your last visit? No    2. Have you seen or consulted any other health care providers outside of the [address] [place] [address] since your last visit? Include any pap smears or colon screening.  No     Chief Complaint   Patient presents with    Weight Management     MSWL       Body Weight: 174.7  Body Fat%: 39.8  Muscle Mass Weight: 33.0  Body Water Weight: 42.2  Basal Metabolic Rate: 4193  BMI: 33.01

## 2019-08-29 NOTE — PROGRESS NOTES
[facility] Progress Note:   F/up Physician Visit    CC: Weight Management      Steffany Ramachandran is a 47 y.o. female who is here for her  f/up physician visit for the VLCD / LCD Program.    Weight Metrics 8/29/2019 8/29/2019 8/27/2019 8/20/2019 8/14/2019 8/13/2019 8/6/2019   Weight - 174 lb 11.2 oz 175 lb 14.4 oz 177 lb 14.4 oz - 180 lb 177 lb 14.4 oz   Neck Circ (inches) - - - - - - -   Waist Measure Inches 36.5 - - - 38 - -   Exercise Mins/week - - - - - - -   BMI - 33.01 kg/m2 33.24 kg/m2 33.61 kg/m2 - 34.01 kg/m2 33.61 kg/m2         Outpatient Medications Marked as Taking for the 8/29/19 encounter (Office Visit) with Kasi Owens MD   Medication Sig Dispense Refill    diclofenac (VOLTAREN) 1 % gel Apply 2 g to affected area four (4) times daily. 1 Each 3    estradiol (CLIMARA) 0.05 mg/24 hr APPLY 1 PATCH TO SKIN EVERY SATURDAY 12 Patch 1    hydroCHLOROthiazide (HYDRODIURIL) 25 mg tablet TAKE 1 TABLET BY MOUTH EVERY DAY 30 Tab 2    cyclobenzaprine (FLEXERIL) 5 mg tablet Take 1 Tab by mouth nightly as needed for Muscle Spasm(s). 30 Tab 1         Participation   Did you attend clinic and class last week? yes    Review of Systems  Since your last visit, have you experienced any complications? no  If yes, please list:       Are you taking an appetite suppressant? no  If so, is there any Chest Pain, Palpitations or Dizziness? BP Readings from Last 3 Encounters:   08/29/19 116/81   08/28/19 120/87   08/20/19 125/86       SLEEP:    Have you received any other medical care this week? no  If yes, where and for what? Have you discontinued or changed any medicine or dose of your medicine since your last visit with Dr Kathryn Garcia? no  If yes, where and for what? Diet  How many ounces of calorie-free liquids did you consume each day? 64 oz    How many meal replacements did you take each day?  1-3 most times 2 and a meal most days    Did you have any problems adhering to the program?  no If yes, please explain:      Exercise  Aerobic exercise: walkng daily min  Resistance exercise: 0 workouts / week  Any discomfort?  no     If yes, where? Objective  Visit Vitals  /81   Pulse 83   Temp 98 °F (36.7 °C) (Oral)   Resp 16   Ht 5' 1\" (1.549 m)   Wt 174 lb 11.2 oz (79.2 kg)   SpO2 97%   BMI 33.01 kg/m²     No LMP recorded. Patient has had a hysterectomy. Physical Exam  Appearance: well,   Mental:A&O x 3, NAD  H:NC/AT,  EENT:   EOMI, PERRL, No scleral icterus  Neck: no bruit or JVD  Lung: clear, No W/R  ABD: soft, active, nontender  Ext:  no Edema  Neuro: nonfocal  Assessment / Plan    Encounter Diagnoses   Name Primary?  Essential hypertension with goal blood pressure less than 130/80 Yes    Blood glucose elevated     Mixed hyperlipidemia     Obesity (BMI 30-39. 9)      Diagnoses and all orders for this visit:    1. Essential hypertension with goal blood pressure less than 130/80  bp great now  2. Blood glucose elevated  monitoring  3. Mixed hyperlipidemia  The lip a was high on last lab. Recheck in sept 4. Obesity (BMI 30-39. 9)  Cont VLCD, have at least 3 shakes. Need to get more protein than she is getting  Avoid carbs with the table foods that she is having along with the shakes      1. Weight management stable   Progress was reviewed with patient    2. Labs    Latest results reviewed with patient   Lab slip given to pt for f/up  labs      Over 20 minutes of the 30 minutes face to face time with Gato consisted of counseling & coordinating and/or discussing treatment plans in reference to her obesuty The primary encounter diagnosis was Essential hypertension with goal blood pressure less than 130/80. Diagnoses of Blood glucose elevated, Mixed hyperlipidemia, and Obesity (BMI 30-39. 9) were also pertinent to this visit.

## 2019-09-03 ENCOUNTER — CLINICAL SUPPORT (OUTPATIENT)
Dept: BARIATRICS/WEIGHT MGMT | Age: 54
End: 2019-09-03

## 2019-09-03 DIAGNOSIS — I10 ESSENTIAL HYPERTENSION WITH GOAL BLOOD PRESSURE LESS THAN 130/80: ICD-10-CM

## 2019-09-03 DIAGNOSIS — R73.03 PREDIABETES: Primary | ICD-10-CM

## 2019-09-03 DIAGNOSIS — E66.9 OBESITY, CLASS I, BMI 30-34.9: ICD-10-CM

## 2019-09-04 VITALS
BODY MASS INDEX: 33.4 KG/M2 | HEIGHT: 61 IN | WEIGHT: 176.9 LBS | SYSTOLIC BLOOD PRESSURE: 122 MMHG | HEART RATE: 92 BPM | DIASTOLIC BLOOD PRESSURE: 80 MMHG

## 2019-09-04 NOTE — PROGRESS NOTES
Progress Note: Weekly Education Class in the [facility]   Is there anything that you or the patient needs to let Dr Manfred Neville know about? no  Over the past week, have you experienced any side-effects? no    Michelle Hopkins is a 47 y.o. female who is enrolled in [facility]    Visit Vitals  /80   Pulse 92   Ht 5' 1\" (1.549 m)   Wt 176 lb 14.4 oz (80.2 kg)   BMI 33.42 kg/m²     Weight Metrics 9/3/2019 8/29/2019 8/29/2019 8/27/2019 8/20/2019 8/14/2019 8/13/2019   Weight 176 lb 14.4 oz - 174 lb 11.2 oz 175 lb 14.4 oz 177 lb 14.4 oz - 180 lb   Neck Circ (inches) - - - - - - -   Waist Measure Inches - 36.5 - - - 38 -   Exercise Mins/week - - - - - - -   BMI 33.42 kg/m2 - 33.01 kg/m2 33.24 kg/m2 33.61 kg/m2 - 34.01 kg/m2         Have you received any other medical care this week? no  If yes, where and for what? Have you had any change in your medications since your last visit? no  If yes what? Did you have any problems adhering to the program last week? no  If yes, please explain:       Eating Habits Over Last Week:  Did you take in 64 oz of non-caloric fluids?  yes     Did you consume your 4 meal replacements each day? no       Physical Activity Over the Past Week:    Aerobic exercise: 135 min  Resistance exercise: 0 workouts / week

## 2019-09-10 ENCOUNTER — CLINICAL SUPPORT (OUTPATIENT)
Dept: BARIATRICS/WEIGHT MGMT | Age: 54
End: 2019-09-10

## 2019-09-10 DIAGNOSIS — R73.03 PREDIABETES: Primary | ICD-10-CM

## 2019-09-10 DIAGNOSIS — I10 ESSENTIAL HYPERTENSION WITH GOAL BLOOD PRESSURE LESS THAN 130/80: ICD-10-CM

## 2019-09-10 DIAGNOSIS — E66.9 OBESITY, CLASS I, BMI 30-34.9: ICD-10-CM

## 2019-09-12 VITALS
BODY MASS INDEX: 33.32 KG/M2 | HEIGHT: 61 IN | SYSTOLIC BLOOD PRESSURE: 119 MMHG | WEIGHT: 176.5 LBS | DIASTOLIC BLOOD PRESSURE: 83 MMHG

## 2019-09-12 NOTE — PROGRESS NOTES
Progress Note: Weekly Education Class in the [facility]   Is there anything that you or the patient needs to let Dr Tiana Miranda know about? no  Over the past week, have you experienced any side-effects? no    Frederick Barbosa is a 47 y.o. female who is enrolled in [facility]    Visit Vitals  /83   Ht 5' 1\" (1.549 m)   Wt 176 lb 8 oz (80.1 kg)   BMI 33.35 kg/m²     Weight Metrics 9/10/2019 9/3/2019 8/29/2019 8/29/2019 8/27/2019 8/20/2019 8/14/2019   Weight 176 lb 8 oz 176 lb 14.4 oz - 174 lb 11.2 oz 175 lb 14.4 oz 177 lb 14.4 oz -   Neck Circ (inches) - - - - - - -   Waist Measure Inches - - 36.5 - - - 38   Exercise Mins/week - - - - - - -   BMI 33.35 kg/m2 33.42 kg/m2 - 33.01 kg/m2 33.24 kg/m2 33.61 kg/m2 -         Have you received any other medical care this week? no  If yes, where and for what? Have you had any change in your medications since your last visit? no  If yes what? Did you have any problems adhering to the program last week? no  If yes, please explain:       Eating Habits Over Last Week:  Did you take in 64 oz of non-caloric fluids?  no     Did you consume your 4 meal replacements each day? no       Physical Activity Over the Past Week:    Aerobic exercise: 120 min  Resistance exercise: 0 workouts / week

## 2019-09-21 LAB
ALBUMIN SERPL-MCNC: 4.6 G/DL (ref 3.5–5.5)
ALBUMIN/GLOB SERPL: 1.5 {RATIO} (ref 1.2–2.2)
ALP SERPL-CCNC: 64 IU/L (ref 39–117)
ALT SERPL-CCNC: 14 IU/L (ref 0–32)
AST SERPL-CCNC: 19 IU/L (ref 0–40)
BILIRUB SERPL-MCNC: 0.2 MG/DL (ref 0–1.2)
BUN SERPL-MCNC: 19 MG/DL (ref 6–24)
BUN/CREAT SERPL: 23 (ref 9–23)
CALCIUM SERPL-MCNC: 9.8 MG/DL (ref 8.7–10.2)
CHLORIDE SERPL-SCNC: 99 MMOL/L (ref 96–106)
CO2 SERPL-SCNC: 26 MMOL/L (ref 20–29)
CREAT SERPL-MCNC: 0.83 MG/DL (ref 0.57–1)
GLOBULIN SER CALC-MCNC: 3.1 G/DL (ref 1.5–4.5)
GLUCOSE SERPL-MCNC: 90 MG/DL (ref 65–99)
LPA SERPL-SCNC: 107.9 NMOL/L
POTASSIUM SERPL-SCNC: 4.2 MMOL/L (ref 3.5–5.2)
PROT SERPL-MCNC: 7.7 G/DL (ref 6–8.5)
SODIUM SERPL-SCNC: 142 MMOL/L (ref 134–144)
URATE SERPL-MCNC: 5.3 MG/DL (ref 2.5–7.1)

## 2019-09-24 ENCOUNTER — OFFICE VISIT (OUTPATIENT)
Dept: FAMILY MEDICINE CLINIC | Age: 54
End: 2019-09-24

## 2019-09-24 VITALS
RESPIRATION RATE: 17 BRPM | HEIGHT: 61 IN | SYSTOLIC BLOOD PRESSURE: 106 MMHG | BODY MASS INDEX: 33.59 KG/M2 | OXYGEN SATURATION: 97 % | DIASTOLIC BLOOD PRESSURE: 75 MMHG | WEIGHT: 177.9 LBS | TEMPERATURE: 98 F | HEART RATE: 83 BPM

## 2019-09-24 DIAGNOSIS — I10 ESSENTIAL HYPERTENSION WITH GOAL BLOOD PRESSURE LESS THAN 130/80: Primary | ICD-10-CM

## 2019-09-24 DIAGNOSIS — R73.9 BLOOD GLUCOSE ELEVATED: ICD-10-CM

## 2019-09-24 DIAGNOSIS — E78.2 MIXED HYPERLIPIDEMIA: ICD-10-CM

## 2019-09-24 DIAGNOSIS — E66.9 OBESITY (BMI 30-39.9): ICD-10-CM

## 2019-09-24 DIAGNOSIS — R63.4 RAPID WEIGHT LOSS: ICD-10-CM

## 2019-09-24 NOTE — PROGRESS NOTES
[facility] Progress Note:   F/up Physician Visit    CC: Weight Management      Zari Morelos is a 47 y.o. female who is here for her  f/up physician visit for the VLCD / LCD Program.        She is feeling stressed most days due to family stressors  She is sleeping 3-5 hours a night    Weight Metrics 9/24/2019 9/24/2019 9/10/2019 9/3/2019 8/29/2019 8/29/2019 8/27/2019   Weight - 177 lb 14.4 oz 176 lb 8 oz 176 lb 14.4 oz - 174 lb 11.2 oz 175 lb 14.4 oz   Neck Circ (inches) - - - - - - -   Waist Measure Inches 40 - - - 36.5 - -   Exercise Mins/week - - - - - - -   BMI - 33.61 kg/m2 33.35 kg/m2 33.42 kg/m2 - 33.01 kg/m2 33.24 kg/m2         Outpatient Medications Marked as Taking for the 9/24/19 encounter (Office Visit) with Pravin Hargrove MD   Medication Sig Dispense Refill    diclofenac (VOLTAREN) 1 % gel Apply 2 g to affected area four (4) times daily. 1 Each 3    estradiol (CLIMARA) 0.05 mg/24 hr APPLY 1 PATCH TO SKIN EVERY SATURDAY 12 Patch 1    hydroCHLOROthiazide (HYDRODIURIL) 25 mg tablet TAKE 1 TABLET BY MOUTH EVERY DAY 30 Tab 2    cyclobenzaprine (FLEXERIL) 5 mg tablet Take 1 Tab by mouth nightly as needed for Muscle Spasm(s). 30 Tab 1         Participation   Did you attend clinic and class last week? no    Review of Systems  Since your last visit, have you experienced any complications? no  If yes, please list:       Are you taking an appetite suppressant? no  If so, is there any Chest Pain, Palpitations or Dizziness? BP Readings from Last 3 Encounters:   09/24/19 106/75   09/12/19 119/83   09/04/19 122/80       SLEEP:    Have you received any other medical care this week? no  If yes, where and for what? Have you discontinued or changed any medicine or dose of your medicine since your last visit with Dr Karin Miller? no  If yes, where and for what?          Diet  How many ounces of calorie-free liquids did you consume each day?  50 oz    How many meal replacements did you take each day? *1-2    Did you have any problems adhering to the program?  no If yes, please explain:      Exercise  Aerobic exercise: 30 min  Resistance exercise: 5 workouts / week  Any discomfort?  no     If yes, where? Objective  Visit Vitals  /75   Pulse 83   Temp 98 °F (36.7 °C) (Oral)   Resp 17   Ht 5' 1\" (1.549 m)   Wt 177 lb 14.4 oz (80.7 kg)   SpO2 97%   BMI 33.61 kg/m²     No LMP recorded. Patient has had a hysterectomy. Physical Exam  Appearance: well,   Mental:A&O x 3, NAD  H:NC/AT,  EENT:   EOMI, PERRL, No scleral icterus  Neck: no bruit or JVD  Lung: clear, No W/R  ABD: soft, active, nontender  Ext:  No Edema  Neuro: nonfocal  Assessment / Plan    Encounter Diagnoses   Name Primary?  Essential hypertension with goal blood pressure less than 130/80 Yes    Blood glucose elevated     Mixed hyperlipidemia     Obesity (BMI 30-39. 9)     Rapid weight loss      Diagnoses and all orders for this visit:    1. Essential hypertension with goal blood pressure less than 130/80    2. Blood glucose elevated    3. Mixed hyperlipidemia    4. Obesity (BMI 30-39.9)  -     REFERRAL TO PSYCHOLOGY    5. Rapid weight loss      1. Weight management improved   Progress was reviewed with patient    2. Labs    Latest results reviewed with patient   Lab slip given to pt for f/up  labs      Over 20 minutes of the 30 minutes face to face time with Gato consisted of counseling & coordinating and/or discussing treatment plans in reference to her obesity The primary encounter diagnosis was Essential hypertension with goal blood pressure less than 130/80. Diagnoses of Blood glucose elevated, Mixed hyperlipidemia, Obesity (BMI 30-39.9), and Rapid weight loss were also pertinent to this visit.

## 2019-09-24 NOTE — PROGRESS NOTES
1. Have you been to the ER, urgent care clinic since your last visit? Hospitalized since your last visit? No    2. Have you seen or consulted any other health care providers outside of the [address] [place] [address] since your last visit? Include any pap smears or colon screening.  No     Chief Complaint   Patient presents with    Weight Management     MSWL     Body Weight: 177.9  Body Fat%: 39.3  Muscle Mass Weight: 33.6  Body Water Weight: 61.0  Basal Metabolic Rate: 3740  BMI: 33.61

## 2019-09-28 NOTE — PROGRESS NOTES
Nurse note from patient's weekly VLCD / LCD / Maintenance class was reviewed. Pertinent medical concerns were:   none     BP Readings from Last 3 Encounters:   09/24/19 106/75   09/12/19 119/83   09/04/19 122/80       Weight Metrics 9/24/2019 9/24/2019 9/10/2019 9/3/2019 8/29/2019 8/29/2019 8/27/2019   Weight - 177 lb 14.4 oz 176 lb 8 oz 176 lb 14.4 oz - 174 lb 11.2 oz 175 lb 14.4 oz   Neck Circ (inches) - - - - - - -   Waist Measure Inches 40 - - - 36.5 - -   Exercise Mins/week - - - - - - -   BMI - 33.61 kg/m2 33.35 kg/m2 33.42 kg/m2 - 33.01 kg/m2 33.24 kg/m2       Current Outpatient Medications   Medication Sig Dispense Refill    diclofenac (VOLTAREN) 1 % gel Apply 2 g to affected area four (4) times daily. 1 Each 3    estradiol (CLIMARA) 0.05 mg/24 hr APPLY 1 PATCH TO SKIN EVERY SATURDAY 12 Patch 1    hydroCHLOROthiazide (HYDRODIURIL) 25 mg tablet TAKE 1 TABLET BY MOUTH EVERY DAY 30 Tab 2    cyclobenzaprine (FLEXERIL) 5 mg tablet Take 1 Tab by mouth nightly as needed for Muscle Spasm(s).  30 Tab 1

## 2019-10-04 ENCOUNTER — CLINICAL SUPPORT (OUTPATIENT)
Dept: BARIATRICS/WEIGHT MGMT | Age: 54
End: 2019-10-04

## 2019-10-04 DIAGNOSIS — E66.9 OBESITY, CLASS I, BMI 30-34.9: ICD-10-CM

## 2019-10-04 DIAGNOSIS — I10 ESSENTIAL HYPERTENSION WITH GOAL BLOOD PRESSURE LESS THAN 130/80: ICD-10-CM

## 2019-10-04 DIAGNOSIS — R73.03 PREDIABETES: Primary | ICD-10-CM

## 2019-10-08 ENCOUNTER — TELEPHONE (OUTPATIENT)
Dept: FAMILY MEDICINE CLINIC | Age: 54
End: 2019-10-08

## 2019-10-08 ENCOUNTER — CLINICAL SUPPORT (OUTPATIENT)
Dept: BARIATRICS/WEIGHT MGMT | Age: 54
End: 2019-10-08

## 2019-10-08 VITALS
DIASTOLIC BLOOD PRESSURE: 78 MMHG | HEART RATE: 91 BPM | HEIGHT: 61 IN | SYSTOLIC BLOOD PRESSURE: 119 MMHG | WEIGHT: 175.6 LBS | BODY MASS INDEX: 33.15 KG/M2

## 2019-10-08 DIAGNOSIS — R73.03 PREDIABETES: Primary | ICD-10-CM

## 2019-10-08 DIAGNOSIS — I10 ESSENTIAL HYPERTENSION WITH GOAL BLOOD PRESSURE LESS THAN 130/80: ICD-10-CM

## 2019-10-08 DIAGNOSIS — E66.9 OBESITY, CLASS I, BMI 30-34.9: ICD-10-CM

## 2019-10-08 NOTE — TELEPHONE ENCOUNTER
The patient states she has gotten a bill from her insurance company regarding a lab that was ordered for her; it is two different \"protein\" type labs that she had done; also her  had a similar type lab done; the patient wants to know if it was mis-coded or why the insurance is not paying for it; the best contact number for the patient is [phone]; thank you

## 2019-10-08 NOTE — PROGRESS NOTES
Progress Note: Weekly Education Class in the [facility]   Is there anything that you or the patient needs to let Dr Leah Zhong know about? no  Over the past week, have you experienced any side-effects? no    Kiko Bean is a 47 y.o. female who is enrolled in [facility]    Visit Vitals  /78   Pulse 91   Ht 5' 1\" (1.549 m)   Wt 175 lb 9.6 oz (79.7 kg)   BMI 33.18 kg/m²     Weight Metrics 10/4/2019 9/24/2019 9/24/2019 9/10/2019 9/3/2019 8/29/2019 8/29/2019   Weight 175 lb 9.6 oz - 177 lb 14.4 oz 176 lb 8 oz 176 lb 14.4 oz - 174 lb 11.2 oz   Neck Circ (inches) - - - - - - -   Waist Measure Inches - 40 - - - 36.5 -   Exercise Mins/week - - - - - - -   BMI 33.18 kg/m2 - 33.61 kg/m2 33.35 kg/m2 33.42 kg/m2 - 33.01 kg/m2         Have you received any other medical care this week? no  If yes, where and for what? Have you had any change in your medications since your last visit? no  If yes what? Did you have any problems adhering to the program last week? no  If yes, please explain:       Eating Habits Over Last Week:  Did you take in 64 oz of non-caloric fluids?  no     Did you consume your 4 meal replacements each day? no       Physical Activity Over the Past Week:    Aerobic exercise: 270 min  Resistance exercise: 0 workouts / week

## 2019-10-10 NOTE — TELEPHONE ENCOUNTER
Spoke with pt and advised the codes that are associated with the ordered test Lipoprotein A and B have been applied. There are no additional codes to apply to have insurance cover. Pt stated she was told by insurance the test were experimental. Advised pt I will notify Dr. Aureliano Mckeon to not order those test for the pt or her spouse. Pt verbalized understanding and no further questions.

## 2019-10-14 VITALS
WEIGHT: 175.8 LBS | HEART RATE: 86 BPM | HEIGHT: 61 IN | BODY MASS INDEX: 33.19 KG/M2 | DIASTOLIC BLOOD PRESSURE: 76 MMHG | SYSTOLIC BLOOD PRESSURE: 109 MMHG

## 2019-10-14 NOTE — PROGRESS NOTES
Progress Note: Weekly Education Class in the [facility]   Is there anything that you or the patient needs to let Dr Greg Charles know about? no  Over the past week, have you experienced any side-effects? no    Pauline Biswas is a 47 y.o. female who is enrolled in [facility]    Visit Vitals  /76   Pulse 86   Ht 5' 1\" (1.549 m)   Wt 175 lb 12.8 oz (79.7 kg)   BMI 33.22 kg/m²     Weight Metrics 10/8/2019 10/4/2019 9/24/2019 9/24/2019 9/10/2019 9/3/2019 8/29/2019   Weight 175 lb 12.8 oz 175 lb 9.6 oz - 177 lb 14.4 oz 176 lb 8 oz 176 lb 14.4 oz -   Neck Circ (inches) - - - - - - -   Waist Measure Inches - - 40 - - - 36.5   Exercise Mins/week - - - - - - -   BMI 33.22 kg/m2 33.18 kg/m2 - 33.61 kg/m2 33.35 kg/m2 33.42 kg/m2 -         Have you received any other medical care this week? no  If yes, where and for what? Have you had any change in your medications since your last visit? no  If yes what? Did you have any problems adhering to the program last week? no  If yes, please explain:       Eating Habits Over Last Week:  Did you take in 64 oz of non-caloric fluids?  no     Did you consume your 4 meal replacements each day? no       Physical Activity Over the Past Week:    Aerobic exercise: 135 min  Resistance exercise: 0 workouts / week

## 2019-10-15 ENCOUNTER — CLINICAL SUPPORT (OUTPATIENT)
Dept: BARIATRICS/WEIGHT MGMT | Age: 54
End: 2019-10-15

## 2019-10-15 DIAGNOSIS — I10 ESSENTIAL HYPERTENSION WITH GOAL BLOOD PRESSURE LESS THAN 130/80: ICD-10-CM

## 2019-10-15 DIAGNOSIS — R73.03 PREDIABETES: Primary | ICD-10-CM

## 2019-10-15 DIAGNOSIS — E66.9 OBESITY, CLASS I, BMI 30-34.9: ICD-10-CM

## 2019-10-17 VITALS
HEIGHT: 61 IN | HEART RATE: 84 BPM | DIASTOLIC BLOOD PRESSURE: 77 MMHG | WEIGHT: 177.3 LBS | BODY MASS INDEX: 33.47 KG/M2 | SYSTOLIC BLOOD PRESSURE: 110 MMHG

## 2019-10-17 NOTE — PROGRESS NOTES
Nurse note from patient's weekly VLCD / LCD / Maintenance class was reviewed. Pertinent medical concerns were:   none     BP Readings from Last 3 Encounters:   10/14/19 109/76   10/08/19 119/78   09/24/19 106/75       Weight Metrics 10/8/2019 10/4/2019 9/24/2019 9/24/2019 9/10/2019 9/3/2019 8/29/2019   Weight 175 lb 12.8 oz 175 lb 9.6 oz - 177 lb 14.4 oz 176 lb 8 oz 176 lb 14.4 oz -   Neck Circ (inches) - - - - - - -   Waist Measure Inches - - 40 - - - 36.5   Exercise Mins/week - - - - - - -   BMI 33.22 kg/m2 33.18 kg/m2 - 33.61 kg/m2 33.35 kg/m2 33.42 kg/m2 -       Current Outpatient Medications   Medication Sig Dispense Refill    diclofenac (VOLTAREN) 1 % gel Apply 2 g to affected area four (4) times daily. 1 Each 3    estradiol (CLIMARA) 0.05 mg/24 hr APPLY 1 PATCH TO SKIN EVERY SATURDAY 12 Patch 1    hydroCHLOROthiazide (HYDRODIURIL) 25 mg tablet TAKE 1 TABLET BY MOUTH EVERY DAY 30 Tab 2    cyclobenzaprine (FLEXERIL) 5 mg tablet Take 1 Tab by mouth nightly as needed for Muscle Spasm(s).  30 Tab 1

## 2019-10-17 NOTE — PROGRESS NOTES
Progress Note: Weekly Education Class in the [facility]   Is there anything that you or the patient needs to let Dr Manfred Neville know about? no  Over the past week, have you experienced any side-effects? no    Michelle Hopkins is a 47 y.o. female who is enrolled in [facility]    Visit Vitals  /77   Pulse 84   Ht 5' 1\" (1.549 m)   Wt 177 lb 4.8 oz (80.4 kg)   BMI 33.50 kg/m²     Weight Metrics 10/15/2019 10/8/2019 10/4/2019 9/24/2019 9/24/2019 9/10/2019 9/3/2019   Weight 177 lb 4.8 oz 175 lb 12.8 oz 175 lb 9.6 oz - 177 lb 14.4 oz 176 lb 8 oz 176 lb 14.4 oz   Neck Circ (inches) - - - - - - -   Waist Measure Inches - - - 40 - - -   Exercise Mins/week - - - - - - -   BMI 33.5 kg/m2 33.22 kg/m2 33.18 kg/m2 - 33.61 kg/m2 33.35 kg/m2 33.42 kg/m2         Have you received any other medical care this week? no  If yes, where and for what? Have you had any change in your medications since your last visit? no  If yes what? Did you have any problems adhering to the program last week? no  If yes, please explain:       Eating Habits Over Last Week:  Did you take in 64 oz of non-caloric fluids?  no     Did you consume your 4 meal replacements each day? no       Physical Activity Over the Past Week:    Aerobic exercise: 110 min  Resistance exercise: - workouts / week

## 2019-10-26 NOTE — PROGRESS NOTES
Nurse note from patient's weekly VLCD / LCD / Maintenance class was reviewed. Pertinent medical concerns were:   none     BP Readings from Last 3 Encounters:   10/17/19 110/77   10/14/19 109/76   10/08/19 119/78       Weight Metrics 10/15/2019 10/8/2019 10/4/2019 9/24/2019 9/24/2019 9/10/2019 9/3/2019   Weight 177 lb 4.8 oz 175 lb 12.8 oz 175 lb 9.6 oz - 177 lb 14.4 oz 176 lb 8 oz 176 lb 14.4 oz   Neck Circ (inches) - - - - - - -   Waist Measure Inches - - - 40 - - -   Exercise Mins/week - - - - - - -   BMI 33.5 kg/m2 33.22 kg/m2 33.18 kg/m2 - 33.61 kg/m2 33.35 kg/m2 33.42 kg/m2       Current Outpatient Medications   Medication Sig Dispense Refill    diclofenac (VOLTAREN) 1 % gel Apply 2 g to affected area four (4) times daily. 1 Each 3    estradiol (CLIMARA) 0.05 mg/24 hr APPLY 1 PATCH TO SKIN EVERY SATURDAY 12 Patch 1    hydroCHLOROthiazide (HYDRODIURIL) 25 mg tablet TAKE 1 TABLET BY MOUTH EVERY DAY 30 Tab 2    cyclobenzaprine (FLEXERIL) 5 mg tablet Take 1 Tab by mouth nightly as needed for Muscle Spasm(s).  30 Tab 1

## 2020-07-02 DIAGNOSIS — G56.02 CARPAL TUNNEL SYNDROME OF LEFT WRIST: ICD-10-CM

## 2020-07-02 DIAGNOSIS — M54.42 ACUTE LEFT-SIDED LOW BACK PAIN WITH LEFT-SIDED SCIATICA: ICD-10-CM

## 2020-07-02 DIAGNOSIS — I10 ESSENTIAL HYPERTENSION WITH GOAL BLOOD PRESSURE LESS THAN 130/80: ICD-10-CM

## 2020-07-02 RX ORDER — HYDROCHLOROTHIAZIDE 25 MG/1
TABLET ORAL
Qty: 30 TAB | Refills: 2 | OUTPATIENT
Start: 2020-07-02

## 2020-07-02 RX ORDER — CYCLOBENZAPRINE HCL 5 MG
5 TABLET ORAL
Qty: 30 TAB | Refills: 1 | OUTPATIENT
Start: 2020-07-02

## 2020-07-02 RX ORDER — DICLOFENAC SODIUM 10 MG/G
2 GEL TOPICAL 4 TIMES DAILY
Qty: 1 EACH | Refills: 3 | OUTPATIENT
Start: 2020-07-02

## 2020-07-06 NOTE — TELEPHONE ENCOUNTER
Attempted to call patient however no answer and MB is full. Will try again later. VV needed in order for patient to get refills.

## 2020-07-08 NOTE — TELEPHONE ENCOUNTER
----- Message from Kami Ayoub sent at 7/7/2020  6:00 PM EDT -----  Regarding: PRACHI Celeste/ refill  Contact: [phone]  Caller (if not patient): n/a  Relationship of caller (if not patient): n/a  Best contact number(s): [phone]  Name of medication and dosage if known: \"Cyclobenzaprine 5mg\", \"Hydrochlorothiazide 25mg\" . Is patient out of this medication (yes/no): yes   Pharmacy name: [facility] listed in chart? (yes/no): yes  Pharmacy phone number: n/a  Date of last visit: unknown  Details to clarify the request: This is the pt's second attempt. Pt called last week for her and her .  (His was filled but hers was not. )

## 2020-07-08 NOTE — TELEPHONE ENCOUNTER
Attempted to reach pt. No answer, left detailed vm. If pt's call back plz schedule VV for medication refill.  [employer]

## 2020-07-20 ENCOUNTER — VIRTUAL VISIT (OUTPATIENT)
Dept: FAMILY MEDICINE CLINIC | Age: 55
End: 2020-07-20

## 2020-07-20 DIAGNOSIS — I10 ESSENTIAL HYPERTENSION WITH GOAL BLOOD PRESSURE LESS THAN 130/80: Primary | ICD-10-CM

## 2020-07-20 DIAGNOSIS — R51.9 NONINTRACTABLE EPISODIC HEADACHE, UNSPECIFIED HEADACHE TYPE: ICD-10-CM

## 2020-07-20 DIAGNOSIS — E78.5 HYPERLIPIDEMIA, UNSPECIFIED HYPERLIPIDEMIA TYPE: ICD-10-CM

## 2020-07-20 DIAGNOSIS — R73.03 PREDIABETES: ICD-10-CM

## 2020-07-20 DIAGNOSIS — M54.42 ACUTE LEFT-SIDED LOW BACK PAIN WITH LEFT-SIDED SCIATICA: ICD-10-CM

## 2020-07-20 RX ORDER — CYCLOBENZAPRINE HCL 5 MG
5 TABLET ORAL
Qty: 30 TAB | Refills: 2 | Status: SHIPPED | OUTPATIENT
Start: 2020-07-20 | End: 2021-06-25

## 2020-07-20 RX ORDER — HYDROCHLOROTHIAZIDE 25 MG/1
25 TABLET ORAL DAILY
Qty: 30 TAB | Refills: 2 | Status: SHIPPED | OUTPATIENT
Start: 2020-07-20 | End: 2021-06-25

## 2020-07-20 NOTE — PROGRESS NOTES
Jose Anaya is a 49 y.o. female who was seen by synchronous (real-time) audio-video technology on 7/20/2020 for Medication Refill        Assessment & Plan:   Diagnoses and all orders for this visit:    1. Essential hypertension with goal blood pressure less than 130/80  At goal by home monitoring  Continue hctz  Low sodium diet  Weight loss  Exercise 150 minutes weekly  Check cmp, tsh  Will suggest use walk in lab for tests if insurance coverage not active in the next 2 months  -     hydroCHLOROthiazide (HYDRODIURIL) 25 mg tablet; Take 1 Tab by mouth daily. 2. Prediabetes  Counseled on therapeutic lifestyle changes including lower carb diet, increased physical activity, and weight loss  Check A1C  Will suggest use walk in lab for tests if insurance coverage not active in the next 2 months     3. Hyperlipidemia, unspecified hyperlipidemia type  Above goal  TLC Diet:   Saturated fat <7% of calories, cholesterol <200 mg/day   Consider increased viscous (soluble) fiber (10-25 g/day) and plant stanols/sterols  (2g/day) as therapeutic options to enhance LDL lowering  Weight management  Increased physical activity. Will suggest use walk in lab for tests if insurance coverage not active in the next 2 months for check lipid panel    4. Acute left-sided low back pain with left-sided sciatica  Intermittent flares  Heat, ibuprofen, cyclobenzaprine prn for symptoms  -     cyclobenzaprine (FLEXERIL) 5 mg tablet; Take 1 Tab by mouth nightly as needed for Muscle Spasm(s). 5. Nonintractable episodic headache, unspecified headache type  Hydrate before walking  Consider adding OTC anthistamine, monitor symptoms      Follow-up and Dispositions    · Return in about 3 months (around 10/20/2020), or if symptoms worsen or fail to improve, for blood pressure, cholesterol and fasting labs.        I have discussed the diagnosis with the patient and the intended plan as seen in the above orders, and questions were answered concerning future plans. Patient conveyed understanding of the plan at the time of the visit. 712  Subjective:     HPI:    Presents for f/u of htn, prediabetes, low back pain, hyperlipidemia, and evaluation of headaches. Cardiovascular risk analysis - LDL goal is under 100  hypertension  hyperlipidemia  obese    Compliance with treatment thus far has been good. Diet and Lifestyle: generally follows a low fat low cholesterol diet, not attempting to follow a low sodium diet, exercises sporadically, nonsmoker  Home BP Monitoring: at goal by home monitoring. Cardiovascular ROS: taking medications as instructed, no medication side effects noted, no TIA's, no chest pain on exertion, no dyspnea on exertion, no swelling of ankles, no orthostatic dizziness or lightheadedness, no orthopnea or paroxysmal nocturnal dyspnea, no palpitations, no intermittent claudication symptoms. Reports intermittent flares of low back pain; finds heat, ibuprofen, and cyclobenzaprine works well when symptoms occur. Requests refill of cyclobenzaprine. C/o dull headaches, intermittent, frontal, a few times a week, lasting for a few hours, occurring for the past 2 weeks. Tylenol helps. She has noticed they only occur when she walks outside; no headache if walking inside. No change in vision or other neurological symptoms. No nausea or vomiting, no sensitivity to light or sound. Prior to Admission medications    Medication Sig Start Date End Date Taking? Authorizing Provider   hydroCHLOROthiazide (HYDRODIURIL) 25 mg tablet Take 1 Tab by mouth daily.  7/20/20  Yes Belkis Celeste NP   cyclobenzaprine (FLEXERIL) 5 mg tablet Take 1 Tab by mouth nightly as needed for Muscle Spasm(s). 7/20/20  Yes Fermin Castillo NP     Patient Active Problem List   Diagnosis Code    Essential hypertension with goal blood pressure less than 130/80 I10    Prediabetes R73.03    Obesity, Class I, BMI 30-34.9 E66.9     Allergies Allergen Reactions    Pcn [Penicillins] Rash    Pcn [Penicillins] Rash and Swelling    Percocet [Oxycodone-Acetaminophen] Other (comments)     hypotension    Sulfa (Sulfonamide Antibiotics) Hives    Sulfa (Sulfonamide Antibiotics) Rash and Swelling     Past Medical History:   Diagnosis Date    Fibroid tumor      Past Surgical History:   Procedure Laterality Date    HX GYN      removal of fibroid tumors    HX HYSTERECTOMY      HX MENISCUS REPAIR      9/2015    HX PARTIAL HYSTERECTOMY      HX TUBAL LIGATION      HX TUBAL LIGATION      1990     Family History   Problem Relation Age of Onset   Quispe Cancer Mother     Dementia Mother     No Known Problems Father      Social History     Tobacco Use    Smoking status: Never Smoker    Smokeless tobacco: Never Used   Substance Use Topics    Alcohol use: No       Review of Systems   Constitutional: Negative for chills, fever, malaise/fatigue and weight loss. HENT: Negative. Eyes: Negative for blurred vision. Respiratory: Negative. Cardiovascular: Negative for chest pain, palpitations, orthopnea, claudication, leg swelling and PND. Gastrointestinal: Negative. Genitourinary: Negative. Musculoskeletal: Positive for back pain. Skin: Negative. Neurological: Positive for headaches. Negative for dizziness. Endo/Heme/Allergies: Negative. Psychiatric/Behavioral: Negative.         Objective:     Patient-Reported Vitals 7/26/2020   Patient-Reported Weight 195.4   Patient-Reported Height 5'1\"   Patient-Reported Pulse 101   Patient-Reported Temperature 98.3   Patient-Reported Systolic  665   Patient-Reported Diastolic 84      General: alert, cooperative, no distress   Mental  status: normal mood, behavior, speech, dress, motor activity, and thought processes, able to follow commands   HENT: NCAT   Neck: no visualized mass   Resp: no respiratory distress   Neuro: no gross deficits   Skin: no discoloration or lesions of concern on visible areas Psychiatric: normal affect, consistent with stated mood, no evidence of hallucinations     Additional exam findings:   none    We discussed the expected course, resolution and complications of the diagnosis(es) in detail. Medication risks, benefits, costs, interactions, and alternatives were discussed as indicated. I advised her to contact the office if her condition worsens, changes or fails to improve as anticipated. She expressed understanding with the diagnosis(es) and plan. Haile Rodrigez, who was evaluated through a patient-initiated, synchronous (real-time) audio-video encounter, and/or her healthcare decision maker, is aware that it is a billable service, with coverage as determined by her insurance carrier. She provided verbal consent to proceed: Yes, and patient identification was verified. It was conducted pursuant to the emergency declaration under the [address] [place] [address], [address] [place] [address] authority and the [employer] and [employer] General Act. A caregiver was present when appropriate. Ability to conduct physical exam was limited. I was at home. The patient was at home.       Christelle Montes NP

## 2020-07-26 NOTE — PATIENT INSTRUCTIONS
High Blood Pressure: Care Instructions Overview It's normal for blood pressure to go up and down throughout the day. But if it stays up, you have high blood pressure. Another name for high blood pressure is hypertension. Despite what a lot of people think, high blood pressure usually doesn't cause headaches or make you feel dizzy or lightheaded. It usually has no symptoms. But it does increase your risk of stroke, heart attack, and other problems. You and your doctor will talk about your risks of these problems based on your blood pressure. Your doctor will give you a goal for your blood pressure. Your goal will be based on your health and your age. Lifestyle changes, such as eating healthy and being active, are always important to help lower blood pressure. You might also take medicine to reach your blood pressure goal. 
Follow-up care is a key part of your treatment and safety. Be sure to make and go to all appointments, and call your doctor if you are having problems. It's also a good idea to know your test results and keep a list of the medicines you take. How can you care for yourself at home? Medical treatment · If you stop taking your medicine, your blood pressure will go back up. You may take one or more types of medicine to lower your blood pressure. Be safe with medicines. Take your medicine exactly as prescribed. Call your doctor if you think you are having a problem with your medicine. · Talk to your doctor before you start taking aspirin every day. Aspirin can help certain people lower their risk of a heart attack or stroke. But taking aspirin isn't right for everyone, because it can cause serious bleeding. · See your doctor regularly. You may need to see the doctor more often at first or until your blood pressure comes down. · If you are taking blood pressure medicine, talk to your doctor before you take decongestants or anti-inflammatory medicine, such as ibuprofen. Some of these medicines can raise blood pressure. · Learn how to check your blood pressure at home. Lifestyle changes · Stay at a healthy weight. This is especially important if you put on weight around the waist. Losing even 10 pounds can help you lower your blood pressure. · If your doctor recommends it, get more exercise. Walking is a good choice. Bit by bit, increase the amount you walk every day. Try for at least 30 minutes on most days of the week. You also may want to swim, bike, or do other activities. · Avoid or limit alcohol. Talk to your doctor about whether you can drink any alcohol. · Try to limit how much sodium you eat to less than 2,300 milligrams (mg) a day. Your doctor may ask you to try to eat less than 1,500 mg a day. · Eat plenty of fruits (such as bananas and oranges), vegetables, legumes, whole grains, and low-fat dairy products. · Lower the amount of saturated fat in your diet. Saturated fat is found in animal products such as milk, cheese, and meat. Limiting these foods may help you lose weight and also lower your risk for heart disease. · Do not smoke. Smoking increases your risk for heart attack and stroke. If you need help quitting, talk to your doctor about stop-smoking programs and medicines. These can increase your chances of quitting for good. When should you call for help? Call  911 anytime you think you may need emergency care. This may mean having symptoms that suggest that your blood pressure is causing a serious heart or blood vessel problem. Your blood pressure may be over 180/120. For example, call 911 if: 
· You have symptoms of a heart attack. These may include: 
? Chest pain or pressure, or a strange feeling in the chest. 
? Sweating. ? Shortness of breath. ? Nausea or vomiting. ? Pain, pressure, or a strange feeling in the back, neck, jaw, or upper belly or in one or both shoulders or arms. ? Lightheadedness or sudden weakness. ? A fast or irregular heartbeat. · You have symptoms of a stroke. These may include: 
? Sudden numbness, tingling, weakness, or loss of movement in your face, arm, or leg, especially on only one side of your body. ? Sudden vision changes. ? Sudden trouble speaking. ? Sudden confusion or trouble understanding simple statements. ? Sudden problems with walking or balance. ? A sudden, severe headache that is different from past headaches. · You have severe back or belly pain. Do not wait until your blood pressure comes down on its own. Get help right away. Call your doctor now or seek immediate care if: 
· Your blood pressure is much higher than normal (such as 180/120 or higher), but you don't have symptoms. · You think high blood pressure is causing symptoms, such as: 
? Severe headache. 
? Blurry vision. Watch closely for changes in your health, and be sure to contact your doctor if: 
· Your blood pressure measures higher than your doctor recommends at least 2 times. That means the top number is higher or the bottom number is higher, or both. · You think you may be having side effects from your blood pressure medicine. Where can you learn more? Go to http://shruthi-ashley.info/ Enter K016 in the search box to learn more about \"High Blood Pressure: Care Instructions. \" Current as of: December 16, 2019               Content Version: 12.5 © 0001-0668 [employer]. Care instructions adapted under license by [employer] (which disclaims liability or warranty for this information). If you have questions about a medical condition or this instruction, always ask your healthcare professional. [place] [address] any warranty or liability for your use of this information.

## 2020-08-03 ENCOUNTER — TELEPHONE (OUTPATIENT)
Dept: FAMILY MEDICINE CLINIC | Age: 55
End: 2020-08-03

## 2020-08-03 NOTE — TELEPHONE ENCOUNTER
Pt called in to see about her lab results that were done two weeks ago. Call back number for her is [phone]. Thanks.

## 2020-08-03 NOTE — TELEPHONE ENCOUNTER
I think patient used [employer] to do her test due to cost. This means the results will not come to me, they will come to her. She should log into her [employer] acct to see if they are there.  Please bring a copy to the office or send to me attached to a [employer] message

## 2020-08-07 ENCOUNTER — TELEPHONE (OUTPATIENT)
Dept: FAMILY MEDICINE CLINIC | Age: 55
End: 2020-08-07

## 2020-08-07 NOTE — TELEPHONE ENCOUNTER
rec'd lab report from [address] [place] [address] for specimen collected 7/21/2020. Electrolytes, liver and kidney function normal with exception of elevated glucose of 108. A1C is 5.9- stable in prediabetes range from last year. Please eliminate sweetened beverages (soda, tea, juice), minimize sweets and desserts, and limit simple carbs (bread, rice, pasta, potatoes). Walking 20 minutes daily will help decrease your body's resistance to insulin. Weight loss will also help slow progression to diabetes. Recommend initial goal of 5% (9 lbs). Repeat labs in 6 months.

## 2021-02-16 ENCOUNTER — OFFICE VISIT (OUTPATIENT)
Dept: FAMILY MEDICINE CLINIC | Age: 56
End: 2021-02-16
Payer: COMMERCIAL

## 2021-02-16 VITALS
OXYGEN SATURATION: 97 % | BODY MASS INDEX: 36.19 KG/M2 | TEMPERATURE: 98.1 F | SYSTOLIC BLOOD PRESSURE: 118 MMHG | WEIGHT: 191.7 LBS | HEIGHT: 61 IN | DIASTOLIC BLOOD PRESSURE: 85 MMHG | HEART RATE: 88 BPM

## 2021-02-16 DIAGNOSIS — M79.672 ACUTE FOOT PAIN, LEFT: Primary | ICD-10-CM

## 2021-02-16 PROCEDURE — 99213 OFFICE O/P EST LOW 20 MIN: CPT | Performed by: STUDENT IN AN ORGANIZED HEALTH CARE EDUCATION/TRAINING PROGRAM

## 2021-02-16 RX ORDER — MELATONIN
DAILY
COMMUNITY

## 2021-02-16 RX ORDER — ASCORBIC ACID 250 MG
250 TABLET ORAL DAILY
COMMUNITY

## 2021-02-16 NOTE — PROGRESS NOTES
Progress Note    she is a 48y.o. year old female who presents for evalution. Subjective:     Chief Complaint   Patient presents with    Foot Pain     (L) foot x 3 days. states that the pain starts from the back of her thigh and ends at the top of foot where most of the pain is located. Pt states that she has no known injury. Has used tylenol, ice and a heating pad with not much relief. Pain on left side, still sore, but moved down leg to top of foot. Feels it when standing and walking. Also lifting foot to get into the car. Issues with R knee, torn meniscus, repaired 4 years ago. Sometimes feels pain across lower back. Reports history of back issues - pain around time of knee surgery. PT at that time, not currently following an exercise regimen. No issues with strength or sensation, saddle anesthesia    Reviewed PmHx, RxHx, FmHx, SocHx, AllgHx and updated and dated in the chart. Review of Systems - negative except as listed above in the HPI    Objective:     Vitals:    02/16/21 1429   BP: 118/85   Pulse: 88   Temp: 98.1 °F (36.7 °C)   SpO2: 97%   Weight: 191 lb 11.2 oz (87 kg)   Height: 5' 1\" (1.549 m)       Current Outpatient Medications   Medication Sig    cholecalciferol (Vitamin D3) (1000 Units /25 mcg) tablet Take  by mouth daily.  ascorbic acid, vitamin C, (Vitamin C) 250 mg tablet Take  by mouth.  zinc oxide-cod liver oil (DESITIN) 40 % ointment Apply  to affected area as needed for Skin Irritation.  elderberry fruit (ELDERBERRY PO) Take  by mouth.  multivitamin with minerals (HAIR,SKIN AND NAILS PO) Take  by mouth.  hydroCHLOROthiazide (HYDRODIURIL) 25 mg tablet Take 1 Tab by mouth daily.  cyclobenzaprine (FLEXERIL) 5 mg tablet Take 1 Tab by mouth nightly as needed for Muscle Spasm(s). No current facility-administered medications for this visit. Physical Exam  Vitals signs and nursing note reviewed.    Constitutional:       General: She is not in acute distress. Appearance: Normal appearance. She is not ill-appearing, toxic-appearing or diaphoretic. HENT:      Head: Normocephalic and atraumatic. Eyes:      General: No scleral icterus. Right eye: No discharge. Left eye: No discharge. Conjunctiva/sclera: Conjunctivae normal.   Neck:      Musculoskeletal: No neck rigidity. Cardiovascular:      Pulses: Normal pulses. Pulmonary:      Effort: Pulmonary effort is normal. No respiratory distress. Musculoskeletal:      Comments: Crossed and straight leg test negative bilaterally. No tenderness over spine or back. No tenderness of left leg. Normal strength and range of motion of ankle, pain elicited with inversion. Tender over anterior talofibular ligament and talus. Mild swelling in that area as well. No bruising. No instability or deformity. Skin:     General: Skin is warm and dry. Coloration: Skin is not jaundiced. Findings: No bruising or rash (over visualized areas). Neurological:      General: No focal deficit present. Mental Status: She is alert. Psychiatric:         Mood and Affect: Mood normal.         Behavior: Behavior normal.         Thought Content: Thought content normal.         Judgment: Judgment normal.            Assessment/ Plan:   Diagnoses and all orders for this visit:    1. Acute foot pain, left -reports of pain up the leg consistent with nerve, no evidence of sciatica on exam but with tenderness over. Possible sprain, though no injury on recall. Possible arthritis. X-ray to rule out fracture. Advised could wait until podiatry visit for x-ray. Recommended NSAIDs, ice, rest.  -     REFERRAL TO PODIATRY  -     XR ANKLE LT MIN 3 V; Future       Follow-up and Dispositions    · Return if symptoms worsen or fail to improve. I have discussed the diagnosis with the patient and the intended plan as seen in the above orders.   The patient has received an after-visit summary and questions were answered concerning future plans. Pt conveyed understanding of plan.     Medication Side Effects and Warnings were discussed with patient      Jacqui Roblero MD

## 2021-02-16 NOTE — PATIENT INSTRUCTIONS
Foot Pain: Care Instructions Your Care Instructions Foot injuries that cause pain and swelling are fairly common. Almost all sports or home repair projects can cause a misstep that ends up as foot pain. Normal wear and tear, especially as you get older, also can cause foot pain. Most minor foot injuries will heal on their own, and home treatment is usually all you need to do. If you have a severe injury, you may need tests and treatment. Follow-up care is a key part of your treatment and safety. Be sure to make and go to all appointments, and call your doctor if you are having problems. It's also a good idea to know your test results and keep a list of the medicines you take. How can you care for yourself at home? · Take pain medicines exactly as directed. ? If the doctor gave you a prescription medicine for pain, take it as prescribed. ? If you are not taking a prescription pain medicine, ask your doctor if you can take an over-the-counter medicine. · Rest and protect your foot. Take a break from any activity that may cause pain. · Put ice or a cold pack on your foot for 10 to 20 minutes at a time. Put a thin cloth between the ice and your skin. · Prop up the sore foot on a pillow when you ice it or anytime you sit or lie down during the next 3 days. Try to keep it above the level of your heart. This will help reduce swelling. · Your doctor may recommend that you wrap your foot with an elastic bandage. Keep your foot wrapped for as long as your doctor advises. · If your doctor recommends crutches, use them as directed. · Wear roomy footwear. · As soon as pain and swelling end, begin gentle exercises of your foot. Your doctor can tell you which exercises will help. When should you call for help? Call 911 anytime you think you may need emergency care. For example, call if: 
  · Your foot turns pale, white, blue, or cold. Call your doctor now or seek immediate medical care if:   · You cannot move or stand on your foot.  
  · Your foot looks twisted or out of its normal position.  
  · Your foot is not stable when you step down.  
  · You have signs of infection, such as: 
? Increased pain, swelling, warmth, or redness. ? Red streaks leading from the sore area. ? Pus draining from a place on your foot. ? A fever.  
  · Your foot is numb or tingly. Watch closely for changes in your health, and be sure to contact your doctor if: 
  · You do not get better as expected.  
  · You have bruises from an injury that last longer than 2 weeks. Where can you learn more? Go to http://www.gray.com/ Enter A034 in the search box to learn more about \"Foot Pain: Care Instructions. \" Current as of: March 2, 2020               Content Version: 12.6 © 7909-7953 [employer]. Care instructions adapted under license by [employer] (which disclaims liability or warranty for this information). If you have questions about a medical condition or this instruction, always ask your healthcare professional. [address] any warranty or liability for your use of this information.

## 2021-02-16 NOTE — PROGRESS NOTES
Milan Meyer is a 48 y.o. female , id x 2(name and ). Reviewed record, history, and  medications. Chief Complaint   Patient presents with    Foot Pain     (L) foot x 3 days. states that the pain starts from the back of her thigh and ends at the top of foot where most of the pain is located. Pt states that she has no known injury. Has used tylenol, ice and a heating pad with not much relief. Vitals:    21 1429   Pulse: 88   Temp: 98.1 °F (36.7 °C)   SpO2: 97%   Weight: 191 lb 11.2 oz (87 kg)   Height: 5' 1\" (1.549 m)   PainSc:   7   PainLoc: Foot       Coordination of Care Questionnaire:   1) Have you been to an emergency room, urgent care, or hospitalized since your last visit?   no       2. Have seen or consulted any other health care provider since your last visit? NO      3 most recent PHQ Screens 2021   Little interest or pleasure in doing things Not at all   Feeling down, depressed, irritable, or hopeless Not at all   Total Score PHQ 2 0       Patient is accompanied by self I have received verbal consent from Milan Meyer to discuss any/all medical information while they are present in the room.

## 2021-06-24 DIAGNOSIS — M54.42 ACUTE LEFT-SIDED LOW BACK PAIN WITH LEFT-SIDED SCIATICA: ICD-10-CM

## 2021-06-24 DIAGNOSIS — I10 ESSENTIAL HYPERTENSION WITH GOAL BLOOD PRESSURE LESS THAN 130/80: ICD-10-CM

## 2021-06-25 ENCOUNTER — PATIENT MESSAGE (OUTPATIENT)
Dept: FAMILY MEDICINE CLINIC | Age: 56
End: 2021-06-25

## 2021-06-25 RX ORDER — HYDROCHLOROTHIAZIDE 25 MG/1
25 TABLET ORAL DAILY
Qty: 30 TABLET | Refills: 0 | Status: SHIPPED | OUTPATIENT
Start: 2021-06-25 | End: 2021-09-10 | Stop reason: SDUPTHER

## 2021-06-25 RX ORDER — CYCLOBENZAPRINE HCL 5 MG
5 TABLET ORAL
Qty: 30 TABLET | Refills: 0 | Status: SHIPPED | OUTPATIENT
Start: 2021-06-25

## 2021-06-25 NOTE — LETTER
8/5/2021 2:27 PM    Ms. Janel Barron  [address] [facility] [place]   [address] [facility] [address]      Dear Ms. Ryan Bellamy:    Yaa Gonzalez there, we received your refill request. A 30 day supply has been sent to your pharmacy on file however, you must make an appointment for future refills.  Please call the office at [phone].  Marcia Peraza LPN

## 2021-09-02 ENCOUNTER — APPOINTMENT (OUTPATIENT)
Dept: GENERAL RADIOLOGY | Age: 56
End: 2021-09-02
Attending: FAMILY MEDICINE

## 2021-09-02 ENCOUNTER — APPOINTMENT (OUTPATIENT)
Dept: CT IMAGING | Age: 56
End: 2021-09-02
Attending: FAMILY MEDICINE

## 2021-09-02 ENCOUNTER — APPOINTMENT (OUTPATIENT)
Dept: ULTRASOUND IMAGING | Age: 56
End: 2021-09-02
Attending: FAMILY MEDICINE

## 2021-09-02 ENCOUNTER — HOSPITAL ENCOUNTER (EMERGENCY)
Age: 56
Discharge: HOME | End: 2021-09-02
Attending: FAMILY MEDICINE

## 2021-09-02 VITALS
BODY MASS INDEX: 36.44 KG/M2 | HEIGHT: 62 IN | RESPIRATION RATE: 21 BRPM | WEIGHT: 198 LBS | DIASTOLIC BLOOD PRESSURE: 105 MMHG | SYSTOLIC BLOOD PRESSURE: 158 MMHG | TEMPERATURE: 97.7 F | HEART RATE: 58 BPM | OXYGEN SATURATION: 98 %

## 2021-09-02 DIAGNOSIS — K80.20 GALLSTONES: ICD-10-CM

## 2021-09-02 DIAGNOSIS — R10.13 ABDOMINAL PAIN, EPIGASTRIC: ICD-10-CM

## 2021-09-02 DIAGNOSIS — R07.9 ACUTE CHEST PAIN: Primary | ICD-10-CM

## 2021-09-02 LAB
ALBUMIN SERPL-MCNC: 3.7 G/DL (ref 3.5–5)
ALBUMIN/GLOB SERPL: 0.9 {RATIO} (ref 1.1–2.2)
ALP SERPL-CCNC: 69 U/L (ref 45–117)
ALT SERPL-CCNC: 16 U/L (ref 12–78)
ANION GAP SERPL CALC-SCNC: 6 MMOL/L (ref 5–15)
AST SERPL W P-5'-P-CCNC: 15 U/L (ref 15–37)
ATRIAL RATE: 85 BPM
BASOPHILS # BLD: 0 K/UL (ref 0–0.1)
BASOPHILS NFR BLD: 0 % (ref 0–1)
BILIRUB SERPL-MCNC: 0.3 MG/DL (ref 0.2–1)
BUN SERPL-MCNC: 12 MG/DL (ref 6–20)
BUN/CREAT SERPL: 13 (ref 12–20)
CA-I BLD-MCNC: 9.3 MG/DL (ref 8.5–10.1)
CALCULATED P AXIS, ECG09: -13 DEGREES
CALCULATED R AXIS, ECG10: -20 DEGREES
CALCULATED T AXIS, ECG11: 115 DEGREES
CHLORIDE SERPL-SCNC: 104 MMOL/L (ref 97–108)
CO2 SERPL-SCNC: 27 MMOL/L (ref 21–32)
CREAT SERPL-MCNC: 0.92 MG/DL (ref 0.55–1.02)
DIAGNOSIS, 93000: NORMAL
DIFFERENTIAL METHOD BLD: ABNORMAL
EOSINOPHIL # BLD: 0.2 K/UL (ref 0–0.4)
EOSINOPHIL NFR BLD: 4 % (ref 0–7)
ERYTHROCYTE [DISTWIDTH] IN BLOOD BY AUTOMATED COUNT: 14 % (ref 11.5–14.5)
GLOBULIN SER CALC-MCNC: 4.2 G/DL (ref 2–4)
GLUCOSE SERPL-MCNC: 131 MG/DL (ref 65–100)
HCT VFR BLD AUTO: 43.1 % (ref 35–47)
HGB BLD-MCNC: 14.4 G/DL (ref 11.5–16)
IMM GRANULOCYTES # BLD AUTO: 0 K/UL (ref 0–0.04)
IMM GRANULOCYTES NFR BLD AUTO: 0 % (ref 0–0.5)
LIPASE SERPL-CCNC: 141 U/L (ref 73–393)
LYMPHOCYTES # BLD: 1.6 K/UL (ref 0.8–3.5)
LYMPHOCYTES NFR BLD: 33 % (ref 12–49)
MCH RBC QN AUTO: 29 PG (ref 26–34)
MCHC RBC AUTO-ENTMCNC: 33.4 G/DL (ref 30–36.5)
MCV RBC AUTO: 86.7 FL (ref 80–99)
MONOCYTES # BLD: 0.5 K/UL (ref 0–1)
MONOCYTES NFR BLD: 10 % (ref 5–13)
NEUTS SEG # BLD: 2.6 K/UL (ref 1.8–8)
NEUTS SEG NFR BLD: 53 % (ref 32–75)
NRBC # BLD: 0 K/UL (ref 0–0.01)
NRBC BLD-RTO: 0 PER 100 WBC
P-R INTERVAL, ECG05: 152 MS
PLATELET # BLD AUTO: 282 K/UL (ref 150–400)
PMV BLD AUTO: 8.7 FL (ref 8.9–12.9)
POTASSIUM SERPL-SCNC: 3.8 MMOL/L (ref 3.5–5.1)
PROT SERPL-MCNC: 7.9 G/DL (ref 6.4–8.2)
Q-T INTERVAL, ECG07: 352 MS
QRS DURATION, ECG06: 70 MS
QTC CALCULATION (BEZET), ECG08: 418 MS
RBC # BLD AUTO: 4.97 M/UL (ref 3.8–5.2)
SODIUM SERPL-SCNC: 137 MMOL/L (ref 136–145)
TROPONIN I SERPL-MCNC: <0.05 NG/ML
TROPONIN I SERPL-MCNC: <0.05 NG/ML
VENTRICULAR RATE, ECG03: 85 BPM
WBC # BLD AUTO: 4.9 K/UL (ref 3.6–11)

## 2021-09-02 PROCEDURE — 83690 ASSAY OF LIPASE: CPT

## 2021-09-02 PROCEDURE — 74011000636 HC RX REV CODE- 636: Performed by: FAMILY MEDICINE

## 2021-09-02 PROCEDURE — 76705 ECHO EXAM OF ABDOMEN: CPT

## 2021-09-02 PROCEDURE — 85025 COMPLETE CBC W/AUTO DIFF WBC: CPT

## 2021-09-02 PROCEDURE — 71045 X-RAY EXAM CHEST 1 VIEW: CPT

## 2021-09-02 PROCEDURE — 99284 EMERGENCY DEPT VISIT MOD MDM: CPT

## 2021-09-02 PROCEDURE — 96375 TX/PRO/DX INJ NEW DRUG ADDON: CPT

## 2021-09-02 PROCEDURE — 93005 ELECTROCARDIOGRAM TRACING: CPT

## 2021-09-02 PROCEDURE — 74177 CT ABD & PELVIS W/CONTRAST: CPT

## 2021-09-02 PROCEDURE — 96374 THER/PROPH/DIAG INJ IV PUSH: CPT

## 2021-09-02 PROCEDURE — 74011250636 HC RX REV CODE- 250/636: Performed by: FAMILY MEDICINE

## 2021-09-02 PROCEDURE — 80053 COMPREHEN METABOLIC PANEL: CPT

## 2021-09-02 PROCEDURE — 84484 ASSAY OF TROPONIN QUANT: CPT

## 2021-09-02 PROCEDURE — 36415 COLL VENOUS BLD VENIPUNCTURE: CPT

## 2021-09-02 RX ORDER — HYDROCODONE BITARTRATE AND ACETAMINOPHEN 5; 325 MG/1; MG/1
1 TABLET ORAL
Qty: 12 TABLET | Refills: 0 | Status: SHIPPED | OUTPATIENT
Start: 2021-09-02 | End: 2021-09-05

## 2021-09-02 RX ORDER — SODIUM CHLORIDE 9 MG/ML
1000 INJECTION, SOLUTION INTRAVENOUS CONTINUOUS
Status: DISCONTINUED | OUTPATIENT
Start: 2021-09-02 | End: 2021-09-02 | Stop reason: HOSPADM

## 2021-09-02 RX ORDER — HYDROMORPHONE HYDROCHLORIDE 1 MG/ML
1 INJECTION, SOLUTION INTRAMUSCULAR; INTRAVENOUS; SUBCUTANEOUS ONCE
Status: COMPLETED | OUTPATIENT
Start: 2021-09-02 | End: 2021-09-02

## 2021-09-02 RX ORDER — ONDANSETRON 2 MG/ML
4 INJECTION INTRAMUSCULAR; INTRAVENOUS
Status: COMPLETED | OUTPATIENT
Start: 2021-09-02 | End: 2021-09-02

## 2021-09-02 RX ORDER — ONDANSETRON 4 MG/1
4 TABLET, ORALLY DISINTEGRATING ORAL
Qty: 20 TABLET | Refills: 0 | Status: SHIPPED
Start: 2021-09-02 | End: 2022-06-16

## 2021-09-02 RX ADMIN — IOPAMIDOL 100 ML: 755 INJECTION, SOLUTION INTRAVENOUS at 08:49

## 2021-09-02 RX ADMIN — SODIUM CHLORIDE 1000 ML/HR: 9 INJECTION, SOLUTION INTRAVENOUS at 06:25

## 2021-09-02 RX ADMIN — HYDROMORPHONE HYDROCHLORIDE 1 MG: 1 INJECTION, SOLUTION INTRAMUSCULAR; INTRAVENOUS; SUBCUTANEOUS at 06:26

## 2021-09-02 RX ADMIN — ONDANSETRON 4 MG: 2 INJECTION INTRAMUSCULAR; INTRAVENOUS at 06:25

## 2021-09-02 NOTE — ED PROVIDER NOTES
52-year-old with a past medical history of hypertension who is here today complaining of 1 day of gradual onset, intermittent, subxiphoid/epigastric abdominal pain radiating to the back which is pressure-like in sensation, present only at night, worse when she lays on her abdomen/on her right side/with palpation, associated with nausea, and severe in severity. She has never had a similar episode. Past Medical History:   Diagnosis Date    COVID-19 01/01/2021    Fibroid tumor     Hypertension        Past Surgical History:   Procedure Laterality Date    HX GYN      removal of fibroid tumors    HX HYSTERECTOMY      HX MENISCUS REPAIR      9/2015    HX PARTIAL HYSTERECTOMY      HX TUBAL LIGATION      HX TUBAL LIGATION      1990         Family History:   Problem Relation Age of Onset   Sebastien Foy Cancer Mother     Dementia Mother     No Known Problems Father        Social History     Socioeconomic History    Marital status:      Spouse name: Not on file    Number of children: Not on file    Years of education: Not on file    Highest education level: Not on file   Occupational History    Not on file   Tobacco Use    Smoking status: Never Smoker    Smokeless tobacco: Never Used   Substance and Sexual Activity    Alcohol use: No    Drug use: No    Sexual activity: Yes   Other Topics Concern    Not on file   Social History Narrative    ** Merged History Encounter **          Social Determinants of Health     Financial Resource Strain:     Difficulty of Paying Living Expenses:    Food Insecurity:     Worried About Running Out of Food in the Last Year:     Ran Out of Food in the Last Year:    Transportation Needs:     Lack of Transportation (Medical):      Lack of Transportation (Non-Medical):    Physical Activity:     Days of Exercise per Week:     Minutes of Exercise per Session:    Stress:     Feeling of Stress :    Social Connections:     Frequency of Communication with Friends and Family:     Frequency of Social Gatherings with Friends and Family:     Attends Taoism Services:     Active Member of Clubs or Organizations:     Attends Club or Organization Meetings:     Marital Status:    Intimate Partner Violence:     Fear of Current or Ex-Partner:     Emotionally Abused:     Physically Abused:     Sexually Abused: ALLERGIES: Pcn [penicillins], Pcn [penicillins], Percocet [oxycodone-acetaminophen], Sulfa (sulfonamide antibiotics), and Sulfa (sulfonamide antibiotics)    Review of Systems   Constitutional: Negative for chills and fever. HENT: Negative for rhinorrhea and sore throat. Eyes: Negative for pain and redness. Respiratory: Negative for cough and shortness of breath. Cardiovascular: Positive for chest pain. Negative for leg swelling. Gastrointestinal: Positive for abdominal pain and nausea. Negative for vomiting. Endocrine: Negative for polydipsia and polyuria. Genitourinary: Negative for decreased urine volume, dysuria and frequency. Musculoskeletal: Positive for back pain. Negative for arthralgias. Skin: Negative for color change and rash. Allergic/Immunologic: Negative for environmental allergies, food allergies and immunocompromised state. Neurological: Negative for dizziness and headaches. Hematological: Negative for adenopathy. Does not bruise/bleed easily. Psychiatric/Behavioral: Negative for agitation and hallucinations. All other systems reviewed and are negative. Vitals:    09/02/21 0559   BP: (!) 166/105   Pulse: 87   Resp: 20   Temp: 97.7 °F (36.5 °C)   SpO2: 97%   Weight: 89.8 kg (198 lb)   Height: 5' 2\" (1.575 m)            Physical Exam  Vitals and nursing note reviewed. Exam conducted with a chaperone present. Constitutional:       Comments: Well developed, well nourished, nontoxic-appearing, appears uncomfortable   HENT:      Head: Normocephalic and atraumatic.       Mouth/Throat:      Mouth: Mucous membranes are moist.      Pharynx: No pharyngeal swelling. Eyes:      General: No scleral icterus. Pupils: Pupils are equal, round, and reactive to light. Cardiovascular:      Rate and Rhythm: Normal rate and regular rhythm. Heart sounds: Normal heart sounds. No murmur heard. No friction rub. No gallop. Pulmonary:      Effort: Pulmonary effort is normal.      Breath sounds: Normal breath sounds. Comments: Tender in the right subxiphoid area  Abdominal:      General: Abdomen is flat. Bowel sounds are normal. There is no distension or abdominal bruit. Palpations: Abdomen is soft. Tenderness: There is abdominal tenderness in the right upper quadrant and epigastric area. Hernia: No hernia is present. Skin:     General: Skin is warm and dry. Capillary Refill: Capillary refill takes less than 2 seconds. Neurological:      General: No focal deficit present. Mental Status: She is oriented to person, place, and time. Psychiatric:         Mood and Affect: Mood normal.         Behavior: Behavior normal.          EKG 09/02/21 @ 0557: Normal sinus rhythm, normal axis, heart rate 85, no ectopy or ST changes. Reevalation 0630: Unchanged. Signing out to oncoming ED physician at 0700.

## 2021-09-02 NOTE — DISCHARGE INSTRUCTIONS
Thank you! Thank you for allowing me to care for you in the emergency department. I sincerely hope that you are satisfied with your visit today. It is my goal to provide you with excellent care. Below you will find a list of your labs and imaging from your visit today. Should you have any questions regarding these results please do not hesitate to call the emergency department. Labs -     Recent Results (from the past 12 hour(s))   EKG, 12 LEAD, INITIAL    Collection Time: 09/02/21  5:57 AM   Result Value Ref Range    Ventricular Rate 85 BPM    Atrial Rate 85 BPM    P-R Interval 152 ms    QRS Duration 70 ms    Q-T Interval 352 ms    QTC Calculation (Bezet) 418 ms    Calculated P Axis -13 degrees    Calculated R Axis -20 degrees    Calculated T Axis 115 degrees    Diagnosis       Normal sinus rhythm  Cannot rule out Anterior infarct , age undetermined  Abnormal ECG  No previous ECGs available  Confirmed by [facility] Center[facility]yla FERNANDEZ ([id number]) on 9/2/2021 10:07:35 AM     CBC WITH AUTOMATED DIFF    Collection Time: 09/02/21  6:20 AM   Result Value Ref Range    WBC 4.9 3.6 - 11.0 K/uL    RBC 4.97 3.80 - 5.20 M/uL    HGB 14.4 11.5 - 16.0 g/dL    HCT 43.1 35.0 - 47.0 %    MCV 86.7 80.0 - 99.0 FL    MCH 29.0 26.0 - 34.0 PG    MCHC 33.4 30.0 - 36.5 g/dL    RDW 14.0 11.5 - 14.5 %    PLATELET 632 486 - 268 K/uL    MPV 8.7 (L) 8.9 - 12.9 FL    NRBC 0.0 0.0  WBC    ABSOLUTE NRBC 0.00 0.00 - 0.01 K/uL    NEUTROPHILS 53 32 - 75 %    LYMPHOCYTES 33 12 - 49 %    MONOCYTES 10 5 - 13 %    EOSINOPHILS 4 0 - 7 %    BASOPHILS 0 0 - 1 %    IMMATURE GRANULOCYTES 0 0 - 0.5 %    ABS. NEUTROPHILS 2.6 1.8 - 8.0 K/UL    ABS. LYMPHOCYTES 1.6 0.8 - 3.5 K/UL    ABS. MONOCYTES 0.5 0.0 - 1.0 K/UL    ABS. EOSINOPHILS 0.2 0.0 - 0.4 K/UL    ABS. BASOPHILS 0.0 0.0 - 0.1 K/UL    ABS. IMM.  GRANS. 0.0 0.00 - 0.04 K/UL    DF AUTOMATED     METABOLIC PANEL, COMPREHENSIVE    Collection Time: 09/02/21  6:20 AM   Result Value Ref Range    Sodium 137 136 - 145 mmol/L    Potassium 3.8 3.5 - 5.1 mmol/L    Chloride 104 97 - 108 mmol/L    CO2 27 21 - 32 mmol/L    Anion gap 6 5 - 15 mmol/L    Glucose 131 (H) 65 - 100 mg/dL    BUN 12 6 - 20 mg/dL    Creatinine 0.92 0.55 - 1.02 mg/dL    BUN/Creatinine ratio 13 12 - 20      GFR est AA >60 >60 ml/min/1.73m2    GFR est non-AA >60 >60 ml/min/1.73m2    Calcium 9.3 8.5 - 10.1 mg/dL    Bilirubin, total 0.3 0.2 - 1.0 mg/dL    AST (SGOT) 15 15 - 37 U/L    ALT (SGPT) 16 12 - 78 U/L    Alk. phosphatase 69 45 - 117 U/L    Protein, total 7.9 6.4 - 8.2 g/dL    Albumin 3.7 3.5 - 5.0 g/dL    Globulin 4.2 (H) 2.0 - 4.0 g/dL    A-G Ratio 0.9 (L) 1.1 - 2.2     TROPONIN I    Collection Time: 09/02/21  6:20 AM   Result Value Ref Range    Troponin-I, Qt. <0.05 <0.05 ng/mL   LIPASE    Collection Time: 09/02/21  6:20 AM   Result Value Ref Range    Lipase 141 73 - 393 U/L   TROPONIN I    Collection Time: 09/02/21  9:40 AM   Result Value Ref Range    Troponin-I, Qt. <0.05 <0.05 ng/mL       Radiologic Studies -   CT ABD PELV W CONT   Final Result   1. Cholelithiasis with a few gallstones near the gallbladder neck. No   gallbladder wall thickening or pericholecystic fluid. 2. Other findings as described. US RUQ   Final Result   1. Cholelithiasis with negative sonographic Sol's. 2. Small cyst in the right hepatic lobe. XR CHEST PORT   Final Result   Mild left basilar density which may represent atelectasis and/or   infiltrate. .                  CT Results  (Last 48 hours)                 09/02/21 0849  CT ABD PELV W CONT Final result    Impression:  1. Cholelithiasis with a few gallstones near the gallbladder neck. No   gallbladder wall thickening or pericholecystic fluid. 2. Other findings as described. Narrative:  Axial images from lung bases to the pubic symphysis were obtained following   intravenous administration of 100 mL Isovue-370. Sagittal and coronal   reformatted images were reviewed.  All CT scans at this facility are performed   using dose reduction optimization techniques as appropriate to a performed exam   including the following:   automated exposure control, adjustments of the mA   and/or kV according to patient size, or use of iterative reconstruction   technique. No prior study. INDICATION: Abdominal pain. There are atelectatic changes at the lung bases. No gross effusions. There is a   17 x 15 mm cyst in the right hepatic lobe near the gallbladder fossa measuring   -4 Hounsfield units. Spleen, pancreas and adrenal glands are unremarkable. There   are numerous calcified gallstones in the dependent portion of the gallbladder. A   few calcified gallstones appear to be in the gallbladder neck. No significant   biliary dilatation. No gross pancreatic ductal dilatation. No mass lesions are   seen in the pancreas, spleen or adrenal glands. The abdominal aorta enhances   normally. No retroperitoneal adenopathy. Kidneys enhance symmetrically. No   hydronephrosis or hydroureter. GI tract shows no evidence of obstruction or   bowel wall thickening. Normal appendix. Urinary bladder is unremarkable. No free   fluid or adenopathy in the pelvis. Bone windows and reconstructed images show no   acute bony findings. CXR Results  (Last 48 hours)                 09/02/21 0635  XR CHEST PORT Final result    Impression:  Mild left basilar density which may represent atelectasis and/or   infiltrate. .                    Narrative:  PROCEDURE: XR CHEST PORT       HISTORY:Chest pain       COMPARISON:None           TECHNIQUE: AP portable chest       LIMITATIONS: None       TUBES/LINES: None       LUNG PARENCHYMA/PLEURA: There is minimal hazy density in the left lateral basal   region. Elsewhere the lungs are clear.    TRACHEA/BRONCHI: Normal   PULMONARY VESSELS: Normal   HEART: Normal   MEDIASTINUM: Normal   BONE/SOFT TISSUES: No acute process       OTHER: None                      If you feel that you have not received excellent quality care or timely care, please ask to speak to the nurse manager. Please choose us in the future for your continued health care needs. ------------------------------------------------------------------------------------------------------------  The exam and treatment you received in the Emergency Department were for an urgent problem and are not intended as complete care. It is important that you follow-up with a doctor, nurse practitioner, or physician assistant to:  (1) confirm your diagnosis,  (2) re-evaluation of changes in your illness and treatment, and  (3) for ongoing care. If your symptoms become worse or you do not improve as expected and you are unable to reach your usual health care provider, you should return to the Emergency Department. We are available 24 hours a day. Please take your discharge instructions with you when you go to your follow-up appointment. If you have any problem arranging a follow-up appointment, contact the Emergency Department immediately. If a prescription has been provided, please have it filled as soon as possible to prevent a delay in treatment. Read the entire medication instruction sheet provided to you by the pharmacy. If you have any questions or reservations about taking the medication due to side effects or interactions with other medications, please call your primary care physician or contact the ER to speak with the charge nurse. Make an appointment with your family doctor or the physician you were referred to for follow-up of this visit as instructed on your discharge paperwork, as this is a mandatory follow-up. Return to the ER if you are unable to be seen or if you are unable to be seen in a timely manner. If you have any problem arranging the follow-up visit, contact the Emergency Department immediately.

## 2021-09-02 NOTE — ED NOTES
Patient discharged home following routine work found  to be within defined limits. Medications, fluids and treatments tolerated well by patient. Normal vital signs. Reviewed follow up instructions and medications with patient. Extensive conversation regarding return precautions discussed. Patient acknowledged understanding. All personal belongings taken by patient.

## 2021-09-02 NOTE — ED TRIAGE NOTES
Epigastric pain started 30 min ago radiating thru to backdenies bowel or urinary sx.  Had similar pain last night lasting2 hr.

## 2021-09-07 ENCOUNTER — TELEPHONE (OUTPATIENT)
Dept: SURGERY | Age: 56
End: 2021-09-07

## 2021-09-07 NOTE — TELEPHONE ENCOUNTER
Pt called, stated that she had an appointment tomorrow at 2:20 and wanted to make sure the Doctor had all that he needed as far as images from [facility]. I stated that from what I see in her chart that everything is visible for the provider.

## 2021-09-08 ENCOUNTER — OFFICE VISIT (OUTPATIENT)
Dept: SURGERY | Age: 56
End: 2021-09-08

## 2021-09-08 VITALS
HEIGHT: 62 IN | HEART RATE: 89 BPM | BODY MASS INDEX: 35.7 KG/M2 | WEIGHT: 194 LBS | RESPIRATION RATE: 18 BRPM | SYSTOLIC BLOOD PRESSURE: 119 MMHG | OXYGEN SATURATION: 98 % | TEMPERATURE: 98.6 F | DIASTOLIC BLOOD PRESSURE: 86 MMHG

## 2021-09-08 DIAGNOSIS — K80.20 SYMPTOMATIC CHOLELITHIASIS: Primary | ICD-10-CM

## 2021-09-08 PROCEDURE — 99204 OFFICE O/P NEW MOD 45 MIN: CPT | Performed by: SURGERY

## 2021-09-08 RX ORDER — HYDROCODONE BITARTRATE AND ACETAMINOPHEN 5; 325 MG/1; MG/1
1 TABLET ORAL
COMMUNITY
End: 2022-06-16

## 2021-09-08 NOTE — PROGRESS NOTES
Surgery History and Physical    Subjective:      Dandre Gotti is a 48 y.o. black female who presents for evaluation of epigastric pain and gallstones. A few weeks ago, Mrs. Rennis Peabody developed an acute onset of epigastric pain. The pain recurred again, resulting in her going to the ER. The pain was associated with nausea, but no vomiting, fever, jaundice, or diarrhea. She has no h/o PUD, pancreatitis, liver disease, or other intestinal disease. Her previous abdominal procedures include a ABBE/BSO and tubal ligation. Her US and CT revealed gallstones. Past Medical History:   Diagnosis Date    COVID-19 01/01/2021    Fibroid tumor     Hypertension     Obesity, Class II, BMI 35-39.9      Past Surgical History:   Procedure Laterality Date    HX GYN      Removal of fibroid tumors.  HX KNEE ARTHROSCOPY Right 09/2015    Meniscus repair.  HX ABBE AND BSO      HX TUBAL LIGATION  1990      Family History   Problem Relation Age of Onset   Farzana Anderson Cancer Mother     Dementia Mother     No Known Problems Father      Social History     Tobacco Use    Smoking status: Never Smoker    Smokeless tobacco: Never Used   Substance Use Topics    Alcohol use: No      Prior to Admission medications    Medication Sig Start Date End Date Taking? Authorizing Provider   ondansetron (Zofran ODT) 4 mg disintegrating tablet 1 Tablet by SubLINGual route every eight (8) hours as needed for Nausea or Vomiting. 9/2/21  Yes Norbert Fung MD   cyclobenzaprine (FLEXERIL) 5 mg tablet Take 1 Tablet by mouth nightly as needed for Muscle Spasm(s). OFFICE VISIT REQUIRED PRIOR TO ADDITIONAL REFILLS 6/25/21  Yes Igor Celeste, NP   hydroCHLOROthiazide (HYDRODIURIL) 25 mg tablet Take 1 Tablet by mouth daily. OFFICE VISIT REQUIRED PRIOR TO ADDITIONAL REFILLS 6/25/21  Yes Steven Kim Q, NP   cholecalciferol (Vitamin D3) (1000 Units /25 mcg) tablet Take  by mouth daily.    Yes Provider, Historical   ascorbic acid, vitamin C, (Vitamin C) 250 mg tablet Take 250 mg by mouth daily. Yes Provider, Historical   zinc oxide-cod liver oil (DESITIN) 40 % ointment Apply  to affected area as needed for Skin Irritation. Yes Provider, Historical   elderberry fruit (ELDERBERRY PO) Take  by mouth. Yes Provider, Historical   multivitamin with minerals (HAIR,SKIN AND NAILS PO) Take  by mouth. Yes Provider, Historical   HYDROcodone-acetaminophen (NORCO) 5-325 mg per tablet Take 1 Tablet by mouth every six (6) hours as needed for Pain. Patient not taking: Reported on 9/8/2021    Provider, Historical      Allergies   Allergen Reactions    Pcn [Penicillins] Rash    Pcn [Penicillins] Rash and Swelling    Percocet [Oxycodone-Acetaminophen] Other (comments)     hypotension    Sulfa (Sulfonamide Antibiotics) Hives    Sulfa (Sulfonamide Antibiotics) Rash and Swelling       Review of Systems:  A comprehensive review of systems was negative except for that written in the History of Present Illness. Objective:      Physical Exam:  GENERAL: alert, cooperative, no distress, appears stated age, EYE: negative findings: anicteric sclera, LYMPHATIC: Cervical, supraclavicularnodes normal. , THROAT & NECK: normal, LUNG: clear to auscultation bilaterally, HEART: regular rate and rhythm, ABDOMEN: Soft, ND, minimal RUQ and epigastric tenderness without guarding., EXTREMITIES:  no edema, SKIN: Normal., NEUROLOGIC: negative, PSYCHIATRIC: non focal    Assessment:     Symptomatic cholelithiasis. Plan:     Mrs. Godwin Barksdale will speak with her  regarding having her GB removed. She may elect to wait until she has insurance, possibly next month. I discussed the risks of the procedure including bleeding, infection, wound healing problems, blood clots, injury to the bowel, liver, or bile duct, and reaction to the prep, contrast, or local and general anesthetic. She understands the risks; any and all questions were answered to her satisfaction.   When she calls back,  Tony Ge will be scheduled for an elective outpatient robotic-assisted laparoscopic cholecystectomy with firefly, possible open under general anesthesia. The patient was counseled at length about the risks of michelle Covid-19 during their perioperative period and any recovery window from their procedure. The patient was made aware that michelle Covid-19  may worsen their prognosis for recovering from their procedure and lend to a higher morbidity and/or mortality risk. All material risks, benefits, and reasonable alternatives including postponing the procedure were discussed. The patient does wish to proceed with the procedure at this time.

## 2021-09-08 NOTE — PROGRESS NOTES
1. Have you been to the ER, urgent care clinic since your last visit? Hospitalized since your last visit? Yes seen in the ER at [facility] on 09/02    2. Have you seen or consulted any other health care providers outside of the [address] [place] [address] since your last visit? Include any pap smears or colon screening.  No

## 2021-09-10 ENCOUNTER — VIRTUAL VISIT (OUTPATIENT)
Dept: FAMILY MEDICINE CLINIC | Age: 56
End: 2021-09-10

## 2021-09-10 DIAGNOSIS — I10 ESSENTIAL HYPERTENSION WITH GOAL BLOOD PRESSURE LESS THAN 130/80: ICD-10-CM

## 2021-09-10 DIAGNOSIS — R73.03 PREDIABETES: ICD-10-CM

## 2021-09-10 DIAGNOSIS — E78.5 HYPERLIPIDEMIA LDL GOAL <100: ICD-10-CM

## 2021-09-10 DIAGNOSIS — K80.20 SYMPTOMATIC CHOLELITHIASIS: Primary | ICD-10-CM

## 2021-09-10 PROCEDURE — 99214 OFFICE O/P EST MOD 30 MIN: CPT | Performed by: NURSE PRACTITIONER

## 2021-09-10 RX ORDER — HYDROCHLOROTHIAZIDE 25 MG/1
25 TABLET ORAL DAILY
Qty: 30 TABLET | Refills: 2 | Status: SHIPPED | OUTPATIENT
Start: 2021-09-10 | End: 2022-06-16 | Stop reason: SDUPTHER

## 2021-09-10 NOTE — PROGRESS NOTES
Elly Haynes is a 48 y.o. female who was seen by synchronous (real-time) audio-video technology on 9/10/2021 for Hospital Follow Up        [address] [place], [place] [address]way:   Diagnoses and all orders for this visit:    1. Symptomatic cholelithiasis  dwp surgical intervention is treatment of choice  nonsurgical options are unfortunately only of limited effectiveness   Recommend low fat diet  She in optimistic that she will have insurance coverage soon; she will contact Dr. Denton Pineda when ready to schedule surgery  To ED for sustained/severe pain, abdominal tenderness, fever    2. Essential hypertension with goal blood pressure less than 130/80   Stable  Continue rx  -     hydroCHLOROthiazide (HYDRODIURIL) 25 mg tablet; Take 1 Tablet by mouth daily. 3. Prediabetes  Counseled on therapeutic lifestyle changes  She will use [email or web address] to obtain a1c and sent via [place]    4. Hyperlipidemia LDL goal <100  Control unknown  Will use [email or web address] for fasting lipid panel, share results via [place]  Heart healthy diet recommended       Follow-up and Dispositions    · Return in about 3 months (around 12/10/2021), or if symptoms worsen or fail to improve, for blood pressure. I have discussed the diagnosis with the patient and the intended plan as seen in the above orders, and questions were answered concerning future plans. Patient conveyed understanding of the plan at the time of the visit. 712  Subjective:     HPI:    Presents for ED f/u for abdominal pain. Patient had visit to ED at [facility] on 9/2 for 2 episodes of abdominal pain. CT scan remarkable for multiple calcified gall stones, some near the neck of the gall bladder. Had visit with gen surg on 9/8 for consideration of cholecystecomy. She does not have insurance at present time and wants to discuss non-surgical tx options. She has not had a recurrence of her bilary colic since the ED visit, but does continue to have mild pain/soreness in RUQ. No fever or chills.  No nausea or vomiting. No change in bowel habits. Diet and Lifestyle: generally follows a low fat low cholesterol diet, generally follows a low sodium diet, exercises sporadically  Home BP Monitoring: is not measured at home    Cardiovascular ROS: taking medications as instructed, no medication side effects noted, no TIA's, no chest pain on exertion, no dyspnea on exertion, no swelling of ankles, no orthostatic dizziness or lightheadedness, no orthopnea or paroxysmal nocturnal dyspnea, no palpitations, no intermittent claudication symptoms. Prior to Admission medications    Medication Sig Start Date End Date Taking? Authorizing Provider   hydroCHLOROthiazide (HYDRODIURIL) 25 mg tablet Take 1 Tablet by mouth daily. 9/10/21  Yes Igor Celeste NP   ondansetron (Zofran ODT) 4 mg disintegrating tablet 1 Tablet by SubLINGual route every eight (8) hours as needed for Nausea or Vomiting. 9/2/21  Yes Ehsan Barnett MD   cholecalciferol (Vitamin D3) (1000 Units /25 mcg) tablet Take  by mouth daily. Yes Provider, Historical   ascorbic acid, vitamin C, (Vitamin C) 250 mg tablet Take 250 mg by mouth daily. Yes Provider, Historical   zinc oxide-cod liver oil (DESITIN) 40 % ointment Apply  to affected area as needed for Skin Irritation. Yes Provider, Historical   elderberry fruit (ELDERBERRY PO) Take  by mouth. Yes Provider, Historical   multivitamin with minerals (HAIR,SKIN AND NAILS PO) Take  by mouth. Yes Provider, Historical   HYDROcodone-acetaminophen (NORCO) 5-325 mg per tablet Take 1 Tablet by mouth every six (6) hours as needed for Pain. Patient not taking: Reported on 9/8/2021    Provider, Historical   cyclobenzaprine (FLEXERIL) 5 mg tablet Take 1 Tablet by mouth nightly as needed for Muscle Spasm(s).  OFFICE VISIT REQUIRED PRIOR TO ADDITIONAL REFILLS  Patient not taking: Reported on 9/10/2021 6/25/21   Chata Pinto NP   hydroCHLOROthiazide (HYDRODIURIL) 25 mg tablet Take 1 Tablet by mouth daily. OFFICE VISIT REQUIRED PRIOR TO ADDITIONAL REFILLS 6/25/21 9/10/21  Estela Park NP     Patient Active Problem List   Diagnosis Code    Essential hypertension with goal blood pressure less than 130/80 I10    Prediabetes R73.03    Obesity, Class I, BMI 30-34.9 E66.9    Symptomatic cholelithiasis K80.20     Allergies   Allergen Reactions    Pcn [Penicillins] Rash    Pcn [Penicillins] Rash and Swelling    Percocet [Oxycodone-Acetaminophen] Other (comments)     hypotension    Sulfa (Sulfonamide Antibiotics) Hives    Sulfa (Sulfonamide Antibiotics) Rash and Swelling     Past Medical History:   Diagnosis Date    COVID-19 01/01/2021    Fibroid tumor     Hypertension     Obesity, Class II, BMI 35-39.9      Past Surgical History:   Procedure Laterality Date    HX GYN      Removal of fibroid tumors.  HX KNEE ARTHROSCOPY Right 09/2015    Meniscus repair.  HX ABBE AND BSO      HX TUBAL LIGATION  1990     Family History   Problem Relation Age of Onset   [facility] Cancer Mother     Dementia Mother     No Known Problems Father      Social History     Tobacco Use    Smoking status: Never Smoker    Smokeless tobacco: Never Used   Substance Use Topics    Alcohol use: No       Review of Systems   Constitutional: Negative for chills, fever, malaise/fatigue and weight loss. HENT: Negative for congestion and sore throat. Eyes: Negative. Respiratory: Negative. Cardiovascular: Negative for chest pain and palpitations. Gastrointestinal: Positive for abdominal pain. Negative for blood in stool, constipation, diarrhea, heartburn, nausea and vomiting. Genitourinary: Negative. Musculoskeletal: Negative. Skin: Negative. Neurological: Negative. Endo/Heme/Allergies: Negative. Psychiatric/Behavioral: Negative.         Objective:     Patient-Reported Vitals 7/26/2020   Patient-Reported Weight 195.4   Patient-Reported Height 5'1\"   Patient-Reported Pulse 101   Patient-Reported Temperature 98.3   Patient-Reported Systolic  551   Patient-Reported Diastolic 84      General: alert, cooperative, no distress   Mental  status: normal mood, behavior, speech, dress, motor activity, and thought processes, able to follow commands   HENT: NCAT   Neck: no visualized mass   Resp: no respiratory distress   Neuro: no gross deficits   Skin: no discoloration or lesions of concern on visible areas   Psychiatric: normal affect, consistent with stated mood, no evidence of hallucinations     Additional exam findings:   none    We discussed the expected course, resolution and complications of the diagnosis(es) in detail. Medication risks, benefits, costs, interactions, and alternatives were discussed as indicated. I advised her to contact the office if her condition worsens, changes or fails to improve as anticipated. She expressed understanding with the diagnosis(es) and plan. Suzie Muller, was evaluated through a synchronous (real-time) audio-video encounter. The patient (or guardian if applicable) is aware that this is a billable service. Verbal consent to proceed has been obtained within the past 12 months. The visit was conducted pursuant to the emergency declaration under the [address] [place] [address], [address] [place] [address] authority and the [employer] General Act. Patient identification was verified, and a caregiver was present when appropriate. The patient was located in a state where the provider was credentialed to provide care.     Asad Romero NP  9/10/2021

## 2021-09-10 NOTE — PROGRESS NOTES
Pt presents s/p ER visit and surgical consult for gallstones. Pt would like to discuss non-surgical options with PRACHI Celeste.

## 2022-01-24 RX ORDER — HYDROCODONE BITARTRATE AND ACETAMINOPHEN 5; 325 MG/1; MG/1
1 TABLET ORAL
OUTPATIENT
Start: 2022-01-24

## 2022-03-18 PROBLEM — E78.5 HYPERLIPIDEMIA LDL GOAL <100: Status: ACTIVE | Noted: 2021-09-10

## 2022-03-19 PROBLEM — K80.20 SYMPTOMATIC CHOLELITHIASIS: Status: ACTIVE | Noted: 2021-09-08

## 2022-06-07 ENCOUNTER — DOCUMENTATION ONLY (OUTPATIENT)
Dept: FAMILY MEDICINE CLINIC | Age: 57
End: 2022-06-07

## 2022-06-07 NOTE — PROGRESS NOTES
Received transferred call from [facility] who received [facility]. Pt c/o severe HA's and elevated BP, also states that she has been out of her BP meds for the past month. Unable to fit pt in for an appt until next week d/t not having any appt's, advised pt to seek UC/ ED for worsening or more severe sx's. Pt states that she has a cuff at home that she uses to monitor her BP. This am it was 147/101 and around 1100 it was 139/94. Pt preferred an in office appt over a VV which would have been sooner. Scheduled for 6/16/22.

## 2022-06-16 ENCOUNTER — OFFICE VISIT (OUTPATIENT)
Dept: FAMILY MEDICINE CLINIC | Age: 57
End: 2022-06-16

## 2022-06-16 VITALS
WEIGHT: 190 LBS | HEIGHT: 62 IN | HEART RATE: 90 BPM | TEMPERATURE: 98.3 F | OXYGEN SATURATION: 97 % | BODY MASS INDEX: 34.96 KG/M2 | SYSTOLIC BLOOD PRESSURE: 106 MMHG | RESPIRATION RATE: 18 BRPM | DIASTOLIC BLOOD PRESSURE: 76 MMHG

## 2022-06-16 DIAGNOSIS — Z59.89 DOES NOT HAVE HEALTH INSURANCE: Primary | ICD-10-CM

## 2022-06-16 DIAGNOSIS — R73.03 PREDIABETES: ICD-10-CM

## 2022-06-16 DIAGNOSIS — I10 ESSENTIAL HYPERTENSION WITH GOAL BLOOD PRESSURE LESS THAN 130/80: ICD-10-CM

## 2022-06-16 DIAGNOSIS — E78.5 HYPERLIPIDEMIA LDL GOAL <100: ICD-10-CM

## 2022-06-16 LAB — HBA1C MFR BLD HPLC: 5.7 %

## 2022-06-16 PROCEDURE — 83036 HEMOGLOBIN GLYCOSYLATED A1C: CPT | Performed by: NURSE PRACTITIONER

## 2022-06-16 PROCEDURE — 99213 OFFICE O/P EST LOW 20 MIN: CPT | Performed by: NURSE PRACTITIONER

## 2022-06-16 RX ORDER — HYDROCHLOROTHIAZIDE 25 MG/1
25 TABLET ORAL DAILY
Qty: 90 TABLET | Refills: 1 | Status: SHIPPED | OUTPATIENT
Start: 2022-06-16

## 2022-06-16 SDOH — ECONOMIC STABILITY - INCOME SECURITY: OTHER PROBLEMS RELATED TO HOUSING AND ECONOMIC CIRCUMSTANCES: Z59.89

## 2022-06-16 NOTE — PROGRESS NOTES
Chief Complaint   Patient presents with    Elevated Blood Pressure    Headache     Patient in office today for elevated bp and HA that was noted on 6/7. Bp was 147/101  HA noted-pain located at occipital region. Pt have been taking bp readings daily. BP ranges 120's-140's/70's-100's. Headaches are sporadic. Nausea noted x1. Denies vomiting. Have been treating with tylenol. Pt brings up going to ER in Sept and being diagnosed with gallstones. Has bene managing with diet, did not have surgical intervention. Pt does not have health insurance. Pt would like to have bare minimum labs done including lipid panel and a1c. Denies taking more than 4 g of tylenol daily. BP looks great today. The high readings reported above are without her HCTZ.  is on same dose of medication so since 6/7 she has been taking some of his RX. Noticed immediate improvement in blood pressure. Just needs medication refilled until she gets health insurance. Denies any other concerns at this time. Chief Complaint   Patient presents with    Elevated Blood Pressure    Headache     she is a 55y.o. year old female who presents for evalution. Reviewed PmHx, RxHx, FmHx, SocHx, AllgHx and updated and dated in the chart.     Review of Systems - negative except as listed above in the HPI    Objective:     Vitals:    06/16/22 1421   BP: 106/76   Pulse: 90   Resp: 18   Temp: 98.3 °F (36.8 °C)   TempSrc: Oral   SpO2: 97%   Weight: 190 lb (86.2 kg)   Height: 5' 2\" (1.575 m)     Physical Examination: General appearance - alert, well appearing, and in no distress  Eyes - pupils equal and reactive, extraocular eye movements intact  Ears - bilateral TM's and external ear canals normal  Nose - normal and patent, no erythema, discharge or polyps  Mouth - mucous membranes moist, pharynx normal without lesions  Neck - supple, no significant adenopathy, carotids upstroke normal bilaterally, no bruits  Chest - clear to auscultation, no wheezes, rales or rhonchi, symmetric air entry  Heart - normal rate, regular rhythm, normal S1, S2  Extremities - peripheral pulses normal, no edema, no clubbing or cyanosis    Assessment/ Plan:   Diagnoses and all orders for this visit:    1. Does not have health insurance    2. Essential hypertension with goal blood pressure less than 130/80  -     hydroCHLOROthiazide (HYDRODIURIL) 25 mg tablet; Take 1 Tablet by mouth daily. BP looks great today after resuming rx on 6/7. Refill sent to pharmacy. Continue taking daily as prescribed. 3. Prediabetes  -     AMB POC HEMOGLOBIN A1C  Hemoglobin a1c is 5.7. Continue with efforts to control by following a low carb diet. 4. Hyperlipidemia LDL goal <100  -     LIPID PANEL; Future  Will notify results and deviate plan based on findings. I have discussed the diagnosis with the patient and the intended plan as seen in the above orders. The patient has received an after-visit summary and questions were answered concerning future plans. Medication Side Effects and Warnings were discussed with patient: yes  Patient Labs were reviewed and or requested: yes  Patient Past Records were reviewed and or requested  yes  Patient / Caregiver Understanding of treatment plan was verbalized during office visit YES    RENETTA Meade    Patient Instructions   Tips for improving symptoms of menopause, especially hot flashes! 1. Eat more yams  2. Flax seeds / flax seed oil  3. Pomegranate seeds  4. Over the counter Borage oil 1000 mg capsule twice a day  5.  Vitamin E 400 international units 1 hour prior to bedtime

## 2022-06-16 NOTE — PROGRESS NOTES
Chief Complaint   Patient presents with    Elevated Blood Pressure    Headache     Patient in office today for elevated bp and HA that was noted on 6/7. Bp was 147/101  HA noted-pain located at occipital region. Pt have been taking bp readings daily. BP ranges 120's-140's/70's-100's. Headaches are sporadic. Nausea noted x1. Denies vomiting. Have been treating with tylenol. Pt has not been able to take hctz due to insurance coverage. 1. Have you been to the ER, urgent care clinic since your last visit? Hospitalized since your last visit? No    2. Have you seen or consulted any other health care providers outside of the [address] [place] [address] since your last visit? Include any pap smears or colon screening.  No

## 2022-06-16 NOTE — PATIENT INSTRUCTIONS
Tips for improving symptoms of menopause, especially hot flashes! 1. Eat more yams  2. Flax seeds / flax seed oil  3. Pomegranate seeds  4. Over the counter Borage oil 1000 mg capsule twice a day  5.  Vitamin E 400 international units 1 hour prior to bedtime

## 2022-06-17 LAB
CHOLEST SERPL-MCNC: 276 MG/DL (ref 100–199)
HDLC SERPL-MCNC: 67 MG/DL
IMP & REVIEW OF LAB RESULTS: NORMAL
LDLC SERPL CALC-MCNC: 178 MG/DL (ref 0–99)
SPECIMEN STATUS REPORT, ROLRST: NORMAL
TRIGL SERPL-MCNC: 169 MG/DL (ref 0–149)
VLDLC SERPL CALC-MCNC: 31 MG/DL (ref 5–40)

## 2022-06-17 NOTE — PROGRESS NOTES
The following message was sent to pt via [facility] portal in reference to lab results:  Good morning Ms. Ehsan Jordan are the results of your fasting cholesterol from yesterday. I am glad we decided to do a lipid panel because your cholesterol is VERY high. I recommend you consider starting a once daily cholesterol lowering medication to get this number down. If you're amenable I will send a prescription to the pharmacy for lovastatin. It's the most affordable statin on the market. As you know the BEST way to lower cholesterol is to follow a strict diet that is low fat combined with regular exercise. Here are a few tips on how to do this:  - Avoid foods that are high in saturated fats (especially fried foods)  - Replace butter with margarine  - Eat lots of fresh fruits and vegetables  - Choose fish, chicken, and turkey as your serving of meat  - Try to avoid too many processed foods  - Choose non fat milk  - Use whole wheat bread  You should also try and do 30 minutes of aerobic exercise most days of the week. All of these will contribute to lowering your cholesterol and decrease your risk of heart disease. Let me know your thoughts!   RENETTA Belle

## 2022-06-23 DIAGNOSIS — E78.2 MIXED HYPERLIPIDEMIA: Primary | ICD-10-CM

## 2022-06-23 RX ORDER — SIMVASTATIN 20 MG/1
20 TABLET, FILM COATED ORAL
Qty: 30 TABLET | Refills: 2 | Status: SHIPPED | OUTPATIENT
Start: 2022-06-23 | End: 2022-06-27 | Stop reason: SINTOL

## 2022-06-24 ENCOUNTER — TELEPHONE (OUTPATIENT)
Dept: FAMILY MEDICINE CLINIC | Age: 57
End: 2022-06-24

## 2022-06-24 NOTE — TELEPHONE ENCOUNTER
[place], can she try taking 1/2 a tab every other day for a week and see if she builds  Up a tolerance? If not I can try calling in zetia which may have a more tolerable SE profile.

## 2022-06-24 NOTE — TELEPHONE ENCOUNTER
Pt is calling about the med simvastatin she is having a reactions mouth dry skin on fire muscle are sore also stomach pains please advise  [phone]

## 2022-06-24 NOTE — TELEPHONE ENCOUNTER
Pt has been advised and is willing to try provider's recommendation,will follow up with nurse in 1 wk.

## 2022-06-27 ENCOUNTER — PATIENT MESSAGE (OUTPATIENT)
Dept: FAMILY MEDICINE CLINIC | Age: 57
End: 2022-06-27

## 2022-06-27 ENCOUNTER — TELEPHONE (OUTPATIENT)
Dept: FAMILY MEDICINE CLINIC | Age: 57
End: 2022-06-27

## 2022-06-27 DIAGNOSIS — E78.2 MIXED HYPERLIPIDEMIA: Primary | ICD-10-CM

## 2022-06-27 DIAGNOSIS — E78.2 MIXED HYPERLIPIDEMIA: ICD-10-CM

## 2022-06-27 RX ORDER — ROSUVASTATIN CALCIUM 5 MG/1
5 TABLET, COATED ORAL
Qty: 30 TABLET | Refills: 2 | Status: SHIPPED | OUTPATIENT
Start: 2022-06-27 | End: 2022-06-28 | Stop reason: SDUPTHER

## 2022-06-27 NOTE — TELEPHONE ENCOUNTER
Please send Rosuvastatin to [facility] instead of [facility] due to [facility] charging $120.00 and [facility] charge is $15.00. per patient.  Phone: [phone]

## 2022-06-28 RX ORDER — ROSUVASTATIN CALCIUM 5 MG/1
5 TABLET, COATED ORAL
Qty: 30 TABLET | Refills: 2 | Status: SHIPPED | OUTPATIENT
Start: 2022-06-28

## 2022-08-22 NOTE — TELEPHONE ENCOUNTER
Attempted to reach pt. Patient x2 id verified. Notified message to pt. Advised, pt to uploaded in [place]. Pt stated that she will do it later tonight. 37.7

## 2022-11-10 ENCOUNTER — HOSPITAL ENCOUNTER (OUTPATIENT)
Dept: GENERAL RADIOLOGY | Age: 57
Discharge: HOME | End: 2022-11-10

## 2022-11-10 ENCOUNTER — OFFICE VISIT (OUTPATIENT)
Dept: FAMILY MEDICINE CLINIC | Age: 57
End: 2022-11-10

## 2022-11-10 ENCOUNTER — TELEPHONE (OUTPATIENT)
Dept: FAMILY MEDICINE CLINIC | Age: 57
End: 2022-11-10

## 2022-11-10 VITALS
SYSTOLIC BLOOD PRESSURE: 104 MMHG | BODY MASS INDEX: 35.24 KG/M2 | TEMPERATURE: 98.8 F | HEIGHT: 62 IN | OXYGEN SATURATION: 95 % | DIASTOLIC BLOOD PRESSURE: 74 MMHG | HEART RATE: 110 BPM | WEIGHT: 191.5 LBS | RESPIRATION RATE: 18 BRPM

## 2022-11-10 DIAGNOSIS — R11.2 NAUSEA AND VOMITING, UNSPECIFIED VOMITING TYPE: ICD-10-CM

## 2022-11-10 DIAGNOSIS — R05.1 ACUTE COUGH: ICD-10-CM

## 2022-11-10 DIAGNOSIS — J02.9 SORE THROAT: ICD-10-CM

## 2022-11-10 DIAGNOSIS — J06.9 UPPER RESPIRATORY TRACT INFECTION, UNSPECIFIED TYPE: Primary | ICD-10-CM

## 2022-11-10 LAB
ANION GAP SERPL CALC-SCNC: 7 MMOL/L (ref 5–15)
BASOPHILS # BLD: 0.1 K/UL (ref 0–0.1)
BASOPHILS NFR BLD: 1 % (ref 0–1)
BUN SERPL-MCNC: 8 MG/DL (ref 6–20)
BUN/CREAT SERPL: 7 (ref 12–20)
CALCIUM SERPL-MCNC: 9.6 MG/DL (ref 8.5–10.1)
CHLORIDE SERPL-SCNC: 100 MMOL/L (ref 97–108)
CO2 SERPL-SCNC: 31 MMOL/L (ref 21–32)
CREAT SERPL-MCNC: 1.11 MG/DL (ref 0.55–1.02)
DIFFERENTIAL METHOD BLD: ABNORMAL
EOSINOPHIL # BLD: 0.2 K/UL (ref 0–0.4)
EOSINOPHIL NFR BLD: 3 % (ref 0–7)
ERYTHROCYTE [DISTWIDTH] IN BLOOD BY AUTOMATED COUNT: 14.4 % (ref 11.5–14.5)
FLUAV+FLUBV AG NOSE QL IA.RAPID: NEGATIVE
FLUAV+FLUBV AG NOSE QL IA.RAPID: NEGATIVE
GLUCOSE SERPL-MCNC: 128 MG/DL (ref 65–100)
HCT VFR BLD AUTO: 49.7 % (ref 35–47)
HGB BLD-MCNC: 15.7 G/DL (ref 11.5–16)
IMM GRANULOCYTES # BLD AUTO: 0 K/UL (ref 0–0.04)
IMM GRANULOCYTES NFR BLD AUTO: 0 % (ref 0–0.5)
LYMPHOCYTES # BLD: 1.7 K/UL (ref 0.8–3.5)
LYMPHOCYTES NFR BLD: 23 % (ref 12–49)
MCH RBC QN AUTO: 28.4 PG (ref 26–34)
MCHC RBC AUTO-ENTMCNC: 31.6 G/DL (ref 30–36.5)
MCV RBC AUTO: 89.9 FL (ref 80–99)
MONOCYTES # BLD: 0.6 K/UL (ref 0–1)
MONOCYTES NFR BLD: 8 % (ref 5–13)
NEUTS SEG # BLD: 4.8 K/UL (ref 1.8–8)
NEUTS SEG NFR BLD: 65 % (ref 32–75)
NRBC # BLD: 0 K/UL (ref 0–0.01)
NRBC BLD-RTO: 0 PER 100 WBC
PLATELET # BLD AUTO: 310 K/UL (ref 150–400)
PMV BLD AUTO: 9 FL (ref 8.9–12.9)
POTASSIUM SERPL-SCNC: 3.9 MMOL/L (ref 3.5–5.1)
RBC # BLD AUTO: 5.53 M/UL (ref 3.8–5.2)
S PYO AG THROAT QL: NEGATIVE
SODIUM SERPL-SCNC: 138 MMOL/L (ref 136–145)
VALID INTERNAL CONTROL?: YES
VALID INTERNAL CONTROL?: YES
WBC # BLD AUTO: 7.3 K/UL (ref 3.6–11)

## 2022-11-10 PROCEDURE — 99213 OFFICE O/P EST LOW 20 MIN: CPT | Performed by: FAMILY MEDICINE

## 2022-11-10 PROCEDURE — 87880 STREP A ASSAY W/OPTIC: CPT | Performed by: FAMILY MEDICINE

## 2022-11-10 PROCEDURE — 3078F DIAST BP <80 MM HG: CPT | Performed by: FAMILY MEDICINE

## 2022-11-10 PROCEDURE — 87804 INFLUENZA ASSAY W/OPTIC: CPT | Performed by: FAMILY MEDICINE

## 2022-11-10 PROCEDURE — 71046 X-RAY EXAM CHEST 2 VIEWS: CPT

## 2022-11-10 PROCEDURE — 3074F SYST BP LT 130 MM HG: CPT | Performed by: FAMILY MEDICINE

## 2022-11-10 RX ORDER — DOXYCYCLINE 100 MG/1
100 TABLET ORAL 2 TIMES DAILY
Qty: 14 TABLET | Refills: 0 | Status: SHIPPED | OUTPATIENT
Start: 2022-11-10 | End: 2022-11-17

## 2022-11-10 RX ORDER — BENZONATATE 200 MG/1
200 CAPSULE ORAL
Qty: 21 CAPSULE | Refills: 0 | Status: SHIPPED | OUTPATIENT
Start: 2022-11-10 | End: 2022-11-17

## 2022-11-10 RX ORDER — BENZONATATE 200 MG/1
200 CAPSULE ORAL
Qty: 21 CAPSULE | Refills: 0 | Status: SHIPPED
Start: 2022-11-10 | End: 2022-11-10 | Stop reason: RX

## 2022-11-10 NOTE — PROGRESS NOTES
Isamar Elise (: 1965) is a 55 y.o. female, established patient, here for evaluation of the following chief complaint(s):  Flu Like Symptoms (Experiencing cough, sore throat, body aches, fever, nausea, vomiting, and diarrhea  1 week. [place] gotten worse. )         ASSESSMENT/PLAN:  Below is the assessment and plan developed based on review of pertinent history, physical exam, labs, studies, and medications. 1. Upper respiratory tract infection, unspecified type  2. Sore throat  3. Acute cough  4. Nausea and vomiting, unspecified vomiting type  CXR is normal, no evidence of pneumonia. Will check CBC and BMP as well. She appears well on exam, HR is slightly tachy but improves throughout the visit. Flu and strep test negative in office   Start doxycycline for 1 week of sinus pressure, pain, cough, probably bacterial sinusitis that started as the flu   -allergy to PCN (severe) therefor I do not want to use cephalosporin. Cost is major prohibitive factor for patient  -doxycycline sent for 7 days, tessalon or cough, mucinex for congestion, take hot showers, rest, push fluids. Offered zofran for nausea but patient declines. She is tolerating PO intake fine at this point. ED precautions for chest pain, SOB, severe or worsening symptoms and patient voiced understanding.    -     AMB POC RAPID STREP A  -     AMB POC ARUNA INFLUENZA A/B TEST  -     CBC WITH AUTOMATED DIFF; Future  -     METABOLIC PANEL, BASIC; Future  -     benzonatate (TESSALON) 200 mg capsule; Take 1 Capsule by mouth three (3) times daily as needed for Cough for up to 7 days. , Normal, Disp-21 Capsule, R-0  -     doxycycline (ADOXA) 100 mg tablet; Take 1 Tablet by mouth two (2) times a day for 7 days. , Normal, Disp-14 Tablet, R-0    Follow up PRN    SUBJECTIVE/OBJECTIVE:  Chief Complaint   Patient presents with    Flu Like Symptoms     Experiencing cough, sore throat, body aches, fever, nausea, vomiting, and diarrhea  1 week.    Has gotten worse. Patient is a 54 yo female presenting to office for sick visit. Patient states 7 days ago she found out her granddaughter had the flu, she was around her Friday (7 days ago) (granddaughter is 12mo). Soon after granddaughter left she started feeling \"off. \" Mild headache, sore throat, body aches. She took a COVID test and it was negative. Sx worsened over course of 2 days. She was taking OTC cold and flu medication, then diarrhea, nausea and vomiting started. This lasted for 2 days. She started feeling much better on Tuesday evening, then yesterday afternoon worse again. Cough turned productive in the evenings, yellow colored sputum. No hemoptysis. Cough has been worsening in evenings, abdominal muscles aching from repeated coughing. No chest pains or SOB. No disorientation or confusion. Nausea has been much better in last 2 days, other than when she coughs to the point of vomiting. No lower extremity swelling. No diarrhea in last 2 days. Highest temp over the last week was 99.1F, but she has felt clammy. She is drinking water and OJ, little food intake prior to yesterday due to nausea. She had no issue keeping pills down. Of note, she does not have insurance. She is allergic to PCN abx (rash and swelling of face/throat) as well as sulfa abx. Review of systems negative other than mentioned in HPI    Current Outpatient Medications on File Prior to Visit   Medication Sig Dispense Refill    rosuvastatin (CRESTOR) 5 mg tablet Take 1 Tablet by mouth nightly. 30 Tablet 2    hydroCHLOROthiazide (HYDRODIURIL) 25 mg tablet Take 1 Tablet by mouth daily. 90 Tablet 1    cholecalciferol (VITAMIN D3) (1000 Units /25 mcg) tablet Take  by mouth daily. ascorbic acid, vitamin C, (VITAMIN C) 250 mg tablet Take 250 mg by mouth daily. elderberry fruit (ELDERBERRY PO) Take  by mouth.      multivitamin with minerals (HAIR,SKIN AND NAILS PO) Take  by mouth.       cyclobenzaprine (FLEXERIL) 5 mg tablet Take 1 Tablet by mouth nightly as needed for Muscle Spasm(s). OFFICE VISIT REQUIRED PRIOR TO ADDITIONAL REFILLS (Patient not taking: Reported on 11/10/2022) 30 Tablet 0     No current facility-administered medications on file prior to visit. Patient Active Problem List    Diagnosis Date Noted    Mixed hyperlipidemia 09/10/2021    Symptomatic cholelithiasis 09/08/2021    Prediabetes 11/17/2016    Obesity, Class I, BMI 30-34.9 11/17/2016    Essential hypertension with goal blood pressure less than 130/80 07/07/2016     Allergies   Allergen Reactions    Pcn [Penicillins] Rash    Pcn [Penicillins] Rash and Swelling    Percocet [Oxycodone-Acetaminophen] Other (comments)     hypotension    Sulfa (Sulfonamide Antibiotics) Hives    Sulfa (Sulfonamide Antibiotics) Rash and Swelling     Past Surgical History:   Procedure Laterality Date    HX GYN      Removal of fibroid tumors. HX KNEE ARTHROSCOPY Right 09/2015    Meniscus repair. HX ABBE AND BSO      HX TUBAL LIGATION  1990     Social History     Tobacco Use    Smoking status: Never    Smokeless tobacco: Never   Vaping Use    Vaping Use: Never used   Substance Use Topics    Alcohol use: No    Drug use: No     Family History   Problem Relation Age of Onset    Cancer Mother     Dementia Mother     No Known Problems Father      Vitals:    11/10/22 0730 11/10/22 0754   BP: 104/74    Pulse: (!) 121 (!) 110   Resp: 18    Temp: 98.8 °F (37.1 °C)    TempSrc: Oral    SpO2: 95%    Weight: 191 lb 8 oz (86.9 kg)    Height: 5' 2\" (1.575 m)    PainSc:   7    PainLoc: Throat         Physical Exam  Constitutional:       Appearance: Normal appearance. HENT:      Head: Normocephalic and atraumatic. Right Ear: Tympanic membrane, ear canal and external ear normal.      Left Ear: Tympanic membrane, ear canal and external ear normal.      Nose: Congestion present. Mouth/Throat:      Pharynx: Posterior oropharyngeal erythema (orophargyngeal erythema) present.  No pharyngeal swelling, oropharyngeal exudate or uvula swelling. Tonsils: No tonsillar exudate or tonsillar abscesses. Eyes:      Extraocular Movements: Extraocular movements intact. Conjunctiva/sclera: Conjunctivae normal.      Pupils: Pupils are equal, round, and reactive to light. Cardiovascular:      Rate and Rhythm: Regular rhythm. Tachycardia present. Pulses: Normal pulses. Heart sounds: Normal heart sounds. Pulmonary:      Effort: Pulmonary effort is normal. No respiratory distress. Breath sounds: Normal breath sounds. No stridor. No wheezing, rhonchi or rales. Chest:      Chest wall: No tenderness. Abdominal:      General: Abdomen is flat. Bowel sounds are normal. There is no distension. Palpations: Abdomen is soft. Tenderness: There is no abdominal tenderness. Musculoskeletal:      Cervical back: Normal range of motion and neck supple. No rigidity. Right lower leg: No edema. Left lower leg: No edema. Lymphadenopathy:      Cervical: No cervical adenopathy. Skin:     Capillary Refill: Capillary refill takes less than 2 seconds. Neurological:      General: No focal deficit present. Mental Status: She is alert and oriented to person, place, and time. Psychiatric:         Mood and Affect: Mood normal.         Behavior: Behavior normal.       An electronic signature was used to authenticate this note.   -- Denny Shell PA-C

## 2022-11-10 NOTE — LETTER
11/16/2022    Ms. Negrete [address] [address]      Dear Jovana Méndez:    Please find your most recent results below. Resulted Orders   CBC WITH AUTOMATED DIFF   Result Value Ref Range    WBC 7.3 3.6 - 11.0 K/uL    RBC 5.53 (H) 3.80 - 5.20 M/uL    HGB 15.7 11.5 - 16.0 g/dL    HCT 49.7 (H) 35.0 - 47.0 %    MCV 89.9 80.0 - 99.0 FL    MCH 28.4 26.0 - 34.0 PG    MCHC 31.6 30.0 - 36.5 g/dL    RDW 14.4 11.5 - 14.5 %    PLATELET 672 542 - 212 K/uL    MPV 9.0 8.9 - 12.9 FL    NRBC 0.0 0  WBC    ABSOLUTE NRBC 0.00 0.00 - 0.01 K/uL    NEUTROPHILS 65 32 - 75 %    LYMPHOCYTES 23 12 - 49 %    MONOCYTES 8 5 - 13 %    EOSINOPHILS 3 0 - 7 %    BASOPHILS 1 0 - 1 %    IMMATURE GRANULOCYTES 0 0.0 - 0.5 %    ABS. NEUTROPHILS 4.8 1.8 - 8.0 K/UL    ABS. LYMPHOCYTES 1.7 0.8 - 3.5 K/UL    ABS. MONOCYTES 0.6 0.0 - 1.0 K/UL    ABS. EOSINOPHILS 0.2 0.0 - 0.4 K/UL    ABS. BASOPHILS 0.1 0.0 - 0.1 K/UL    ABS. IMM. GRANS. 0.0 0.00 - 0.04 K/UL    DF AUTOMATED                   RECOMMENDATIONS:    Hi Ms. Bernarda Persaud,  I just tried to call you but your mailbox is full!      Your labs show your white blood cell count is not elevated which is good. Your labs show your electrolytes are all normal which is also good. Your kidney function has worsening slightly-- this can happen with dehydration which you are likely dehydrated due to being sick. Please make sure you are drinking plenty-- try for 6 to 8 glasses of water per day. If you feel lightheaded, dizzy or do not feel better over the weekend you should head to an urgent care or similar.  You can also call the on call doctor if needed at [phone].      We could recheck your kidney function in 2 to 3 weeks to make sure it returns to normal.      Please call me if you have any questions: [phone]    Sincerely,      Santa Child PA-C

## 2022-11-10 NOTE — PROGRESS NOTES
Ina Mccall is a 55 y.o. female , id x 2(name and ). Reviewed record, history, and  medications. Chief Complaint   Patient presents with    Flu Like Symptoms     Experiencing cough, sore throat, body aches, fever, nausea, vomiting, and diarrhea  1 week. Has gotten worse. Vitals:    11/10/22 0730   BP: 104/74   Pulse: (!) 121   Resp: 18   Temp: 98.8 °F (37.1 °C)   TempSrc: Oral   SpO2: 95%   Weight: 191 lb 8 oz (86.9 kg)   Height: 5' 2\" (1.575 m)       Coordination of Care Questionnaire:   1. Have you been to the ER, urgent care clinic since your last visit? Hospitalized since your last visit? No    2. Have you seen or consulted any other health care providers outside of the [address] [place] [address] since your last visit? Include any pap smears or colon screening.  No

## 2022-11-10 NOTE — TELEPHONE ENCOUNTER
Received call from pt. She states she was informed the x-ray results wouldn't be back for 48 hours. She needed to know from [facility] what she should do while she's waiting for the results. Informed the provider. Informed pt per Valentin Funes PA-C: Will send Doxycycline and Tessalon Rx to the pharmacy. May add another antibiotic if needed based on the chest x-ray results. Have the pharmacist run them through [facility] so they will be cheaper. It would cost about $10-15 if it's sent to [facility] and $20-25 if sent to [facility]. She verbalized understanding. She states the [employer] is out of the [facility]. She request Rx be sent to the [facility] near the office.

## 2022-11-11 NOTE — PROGRESS NOTES
I called patient, no answer and unable to leave  as mailbox full. [employer] message sent instead. Hi Ms. Ba Keller,  I just tried to call you but your mailbox is full! Your labs show your white blood cell count is not elevated which is good. Your labs show your electrolytes are all normal which is also good. Your kidney function has worsening slightly-- this can happen with dehydration which you are likely dehydrated due to being sick. Please make sure you are drinking plenty-- try for 6 to 8 glasses of water per day. If you feel lightheaded, dizzy or do not feel better over the weekend you should head to an urgent care or similar. You can also call the on call doctor if needed for advise after hours at [phone].     We could recheck your kidney function in 2 to 3 weeks to make sure it returns to normal.     Let me know if you have any questions,  Shiraz Hernandez PA-C

## 2023-02-21 ENCOUNTER — VIRTUAL VISIT (OUTPATIENT)
Dept: FAMILY MEDICINE CLINIC | Age: 58
End: 2023-02-21

## 2023-02-21 DIAGNOSIS — R09.82 POST-NASAL DRIP: Primary | ICD-10-CM

## 2023-02-21 DIAGNOSIS — J04.0 ACUTE LARYNGITIS: ICD-10-CM

## 2023-02-21 DIAGNOSIS — R05.1 ACUTE COUGH: ICD-10-CM

## 2023-02-21 PROCEDURE — 99213 OFFICE O/P EST LOW 20 MIN: CPT | Performed by: FAMILY MEDICINE

## 2023-02-21 RX ORDER — METHYLPREDNISOLONE 4 MG/1
TABLET ORAL
Qty: 1 DOSE PACK | Refills: 0 | Status: SHIPPED | OUTPATIENT
Start: 2023-02-21

## 2023-02-21 RX ORDER — BENZONATATE 200 MG/1
200 CAPSULE ORAL
Qty: 30 CAPSULE | Refills: 0 | Status: SHIPPED | OUTPATIENT
Start: 2023-02-21

## 2023-02-21 NOTE — PROGRESS NOTES
Progress Note    she is a 65y.o. year old female who presents for evalution. Subjective:     Patient has had a cough for few weeks thinks it may just be postnasal drip. No fever no chills no shortness of breath no chest pain. Coughing to the point of throwing up at times. Feels like irritation in the back of her throat is also losing her voice. Reviewed PmHx, RxHx, FmHx, SocHx, AllgHx and updated and dated in the chart. Review of Systems - negative except as listed above in the HPI    Objective: There were no vitals filed for this visit. Current Outpatient Medications   Medication Sig    benzonatate (TESSALON) 200 mg capsule Take 1 Capsule by mouth three (3) times daily as needed for Cough. methylPREDNISolone (MEDROL DOSEPACK) 4 mg tablet Take as directed    rosuvastatin (CRESTOR) 5 mg tablet Take 1 Tablet by mouth nightly. hydroCHLOROthiazide (HYDRODIURIL) 25 mg tablet Take 1 Tablet by mouth daily. cyclobenzaprine (FLEXERIL) 5 mg tablet Take 1 Tablet by mouth nightly as needed for Muscle Spasm(s). OFFICE VISIT REQUIRED PRIOR TO ADDITIONAL REFILLS (Patient not taking: Reported on 11/10/2022)    cholecalciferol (VITAMIN D3) (1000 Units /25 mcg) tablet Take  by mouth daily. ascorbic acid, vitamin C, (VITAMIN C) 250 mg tablet Take 250 mg by mouth daily. elderberry fruit (ELDERBERRY PO) Take  by mouth.    multivitamin with minerals (HAIR,SKIN AND NAILS PO) Take  by mouth. No current facility-administered medications for this visit. Physical Examination: General appearance - alert, well appearing, and in no distress and squeaky raspy voice, voice going during visit  Chest -normal respiratory effort during visit    Assessment/ Plan:   Diagnoses and all orders for this visit:    1. Post-nasal drip  -     benzonatate (TESSALON) 200 mg capsule; Take 1 Capsule by mouth three (3) times daily as needed for Cough.     2. Acute cough  -     methylPREDNISolone (MEDROL DOSEPACK) 4 mg tablet; Take as directed    3. Acute laryngitis  -     methylPREDNISolone (MEDROL DOSEPACK) 4 mg tablet; Take as directed  Discussed need for voice rest as well. Follow-up and Dispositions    Return if symptoms worsen or fail to improve. I have discussed the diagnosis with the patient and the intended plan as seen in the above orders. The patient has received an after-visit summary and questions were answered concerning future plans. Pt conveyed understanding of plan. Medication Side Effects and Warnings were discussed with patient      Ottoniel Sevilla is being evaluated by a Virtual Visit (video visit) encounter to address concerns as mentioned above. A caregiver was present when appropriate. Due to this being a TeleHealth encounter (During [facility]- public [facility] emergency), evaluation of the following organ systems was limited: Vitals/Constitutional/EENT/Resp/CV/GI//MS/Neuro/Skin/Heme-Lymph-Imm. Pursuant to the emergency declaration under the [address] [place] [address] authority and the [employer] Act, this Virtual Visit was conducted with patient's (and/or legal guardian's) consent, to reduce the patient's risk of exposure to COVID-19 and provide necessary medical care. The patient (and/or legal guardian) has also been advised to contact this office for worsening conditions or problems, and seek emergency medical treatment and/or call 911 if deemed necessary. Patient identification was verified at the start of the visit: Yes    Services were provided through a video synchronous discussion virtually to substitute for in-person clinic visit. Patient and provider were located at their individual homes.   --Lesley Layton DO on 2/21/2023 at 7:18 AM

## 2023-02-21 NOTE — PATIENT INSTRUCTIONS

## 2023-02-27 ENCOUNTER — VIRTUAL VISIT (OUTPATIENT)
Dept: FAMILY MEDICINE CLINIC | Age: 58
End: 2023-02-27

## 2023-02-27 DIAGNOSIS — J20.9 ACUTE BRONCHITIS, UNSPECIFIED ORGANISM: Primary | ICD-10-CM

## 2023-02-27 PROCEDURE — 99213 OFFICE O/P EST LOW 20 MIN: CPT | Performed by: NURSE PRACTITIONER

## 2023-02-27 RX ORDER — PROMETHAZINE HYDROCHLORIDE AND DEXTROMETHORPHAN HYDROBROMIDE 6.25; 15 MG/5ML; MG/5ML
5 SYRUP ORAL
Qty: 200 ML | Refills: 1 | Status: SHIPPED | OUTPATIENT
Start: 2023-02-27 | End: 2023-03-09

## 2023-02-27 RX ORDER — ALBUTEROL SULFATE 90 UG/1
2 AEROSOL, METERED RESPIRATORY (INHALATION)
Qty: 1 EACH | Refills: 0 | Status: SHIPPED | OUTPATIENT
Start: 2023-02-27 | End: 2024-02-22

## 2023-02-27 NOTE — PROGRESS NOTES
Bernarda Dubose is a 65 y.o. female who was seen by synchronous (real-time) audio-video technology on 2/27/2023 for Cough        Assessment & Plan:   Diagnoses and all orders for this visit:    1. Acute bronchitis, unspecified organism  Add promethazine-dm, albuterol for cough  Vocal rest  Fluids  She will update me via [place] later this week on her progress  Call if develops fever, chills, persistent sputum production, purulent nasal discharge, sinus pain/tenderness, wheezing or shortness of breath  -     promethazine-dextromethorphan (PROMETHAZINE-DM) 6.25-15 mg/5 mL syrup; Take 5 mL by mouth four (4) times daily as needed for Cough for up to 10 days. -     albuterol (PROVENTIL HFA, VENTOLIN HFA, PROAIR HFA) 90 mcg/actuation inhaler; Take 2 Puffs by inhalation every four (4) hours as needed for Wheezing for up to 360 days. Follow-up and Dispositions    Return if symptoms worsen or fail to improve. I have discussed the diagnosis with the patient and the intended plan as seen in the above orders, and questions were answered concerning future plans. Patient conveyed understanding of the plan at the time of the visit. Subjective:     HPI:    Present for follow up of acute cough. Last seen virtually on 2/21 with Dr. Zenaida Perez, given  rx for tessalon and medrol dose pack. Symptoms started on 2/6/23. Had dental extraction late January. Mother passes away on 1/26. Cough and voice not better, still hoarse. Has completed prednisone rx. Does not feel tessalon has been effective. Night time cough is very bad, coughs to the point of throwing up. Cough is interfering  with sleep. Chest and abdominal muscles sore from coughing. Cough is sometimes dry, sometimes productive for clear or yellow phlegm. Minimal nasal drainage. No frontal or maxillary sinus tenderness. No wheezing or shortness of breath. No fever or chills. No history of asthma or other chronic lung problems.       Prior to Admission medications    Medication Sig Start Date End Date Taking? Authorizing Provider   promethazine-dextromethorphan (PROMETHAZINE-DM) 6.25-15 mg/5 mL syrup Take 5 mL by mouth four (4) times daily as needed for Cough for up to 10 days. 2/27/23 3/9/23 Yes Igor Celeste Q, NP   albuterol (PROVENTIL HFA, VENTOLIN HFA, PROAIR HFA) 90 mcg/actuation inhaler Take 2 Puffs by inhalation every four (4) hours as needed for Wheezing for up to 360 days. 2/27/23 2/22/24 Yes Igor Celeste Q, NP   benzonatate (TESSALON) 200 mg capsule Take 1 Capsule by mouth three (3) times daily as needed for Cough. 2/21/23  Yes Karla Chiu H, DO   rosuvastatin (CRESTOR) 5 mg tablet Take 1 Tablet by mouth nightly. 6/28/22  Yes Zenovia Asp, NP   hydroCHLOROthiazide (HYDRODIURIL) 25 mg tablet Take 1 Tablet by mouth daily. 6/16/22  Yes Zenovia Asp, NP   cyclobenzaprine (FLEXERIL) 5 mg tablet Take 1 Tablet by mouth nightly as needed for Muscle Spasm(s). OFFICE VISIT REQUIRED PRIOR TO ADDITIONAL REFILLS 6/25/21  Yes Yobani Dalton Q, NP   cholecalciferol (VITAMIN D3) (1000 Units /25 mcg) tablet Take  by mouth daily. Yes Provider, Historical   elderberry fruit (ELDERBERRY PO) Take  by mouth. Yes Provider, Historical   multivitamin with minerals (HAIR,SKIN AND NAILS PO) Take  by mouth. Yes Provider, Historical   methylPREDNISolone (MEDROL DOSEPACK) 4 mg tablet Take as directed 2/21/23 2/27/23  Valora Bumpers H, DO   ascorbic acid, vitamin C, (VITAMIN C) 250 mg tablet Take 250 mg by mouth daily.     Provider, Historical     Patient Active Problem List   Diagnosis Code    Essential hypertension with goal blood pressure less than 130/80 I10    Prediabetes R73.03    Obesity, Class I, BMI 30-34.9 E66.9    Symptomatic cholelithiasis K80.20    Mixed hyperlipidemia E78.2     Allergies   Allergen Reactions    Pcn [Penicillins] Rash    Pcn [Penicillins] Rash and Swelling    Percocet [Oxycodone-Acetaminophen] Other (comments)     hypotension Sulfa (Sulfonamide Antibiotics) Hives    Sulfa (Sulfonamide Antibiotics) Rash and Swelling     Past Medical History:   Diagnosis Date    COVID-19 01/01/2021    Fibroid tumor     Hypertension     Obesity, Class II, BMI 35-39.9      Past Surgical History:   Procedure Laterality Date    HX GYN      Removal of fibroid tumors. HX KNEE ARTHROSCOPY Right 09/2015    Meniscus repair. HX ABBE AND BSO      HX TUBAL LIGATION  1990     Family History   Problem Relation Age of Onset    Cancer Mother     Dementia Mother     No Known Problems Father      Social History     Tobacco Use    Smoking status: Never    Smokeless tobacco: Never   Substance Use Topics    Alcohol use: No       Review of Systems   Constitutional:  Positive for malaise/fatigue. Negative for chills, diaphoresis, fever and weight loss. HENT:  Positive for congestion and sinus pain. Negative for ear pain and sore throat. Eyes: Negative. Respiratory:  Positive for cough, hemoptysis and sputum production. Negative for shortness of breath and wheezing. Cardiovascular: Negative. Gastrointestinal: Negative. Genitourinary: Negative. Musculoskeletal: Negative. Skin: Negative. Endo/Heme/Allergies: Negative. Psychiatric/Behavioral: Negative.        Objective:     Patient-Reported Vitals 7/26/2020   Patient-Reported Weight 195.4   Patient-Reported Height 5'1\"   Patient-Reported Pulse 101   Patient-Reported Temperature 98.3   Patient-Reported Systolic  283   Patient-Reported Diastolic 84      General: alert, cooperative, no distress   Mental  status: normal mood, behavior, speech, dress, motor activity, and thought processes, able to follow commands   HENT: NCAT   Neck: no visualized mass   Resp: no respiratory distress   Neuro: no gross deficits   Skin: no discoloration or lesions of concern on visible areas   Psychiatric: normal affect, consistent with stated mood, no evidence of hallucinations     Additional exam findings:   none    We discussed the expected course, resolution and complications of the diagnosis(es) in detail. Medication risks, benefits, costs, interactions, and alternatives were discussed as indicated. I advised her to contact the office if her condition worsens, changes or fails to improve as anticipated. She expressed understanding with the diagnosis(es) and plan. Edita Bryant, was evaluated through a synchronous (real-time) audio-video encounter. The patient (or guardian if applicable) is aware that this is a billable service, which includes applicable co-pays. This Virtual Visit was conducted with patient's (and/or legal guardian's) consent. The visit was conducted pursuant to the emergency declaration under the [address] [place] [address] authority and the [employer] and [employer] Act. Patient identification was verified, and a caregiver was present when appropriate.   The patient was located at: Home: [address] [place], [place] [id number]  The provider was located at: Home: [address] [place] [address],   2/27/2023

## 2023-03-01 ENCOUNTER — HOSPITAL ENCOUNTER (EMERGENCY)
Age: 58
Discharge: HOME | End: 2023-03-01
Attending: EMERGENCY MEDICINE

## 2023-03-01 ENCOUNTER — APPOINTMENT (OUTPATIENT)
Dept: CT IMAGING | Age: 58
End: 2023-03-01
Attending: EMERGENCY MEDICINE

## 2023-03-01 ENCOUNTER — APPOINTMENT (OUTPATIENT)
Dept: ULTRASOUND IMAGING | Age: 58
End: 2023-03-01
Attending: EMERGENCY MEDICINE

## 2023-03-01 VITALS
TEMPERATURE: 98 F | WEIGHT: 185 LBS | DIASTOLIC BLOOD PRESSURE: 70 MMHG | RESPIRATION RATE: 15 BRPM | OXYGEN SATURATION: 92 % | BODY MASS INDEX: 34.93 KG/M2 | HEIGHT: 61 IN | HEART RATE: 86 BPM | SYSTOLIC BLOOD PRESSURE: 111 MMHG

## 2023-03-01 DIAGNOSIS — K80.50 BILIARY COLIC: Primary | ICD-10-CM

## 2023-03-01 LAB
ALBUMIN SERPL-MCNC: 3.8 G/DL (ref 3.5–5)
ALBUMIN/GLOB SERPL: 0.9 (ref 1.1–2.2)
ALP SERPL-CCNC: 70 U/L (ref 45–117)
ALT SERPL-CCNC: 19 U/L (ref 12–78)
ANION GAP SERPL CALC-SCNC: 4 MMOL/L (ref 5–15)
AST SERPL-CCNC: 12 U/L (ref 15–37)
BASOPHILS # BLD: 0 K/UL (ref 0–0.1)
BASOPHILS NFR BLD: 1 % (ref 0–1)
BILIRUB SERPL-MCNC: 0.8 MG/DL (ref 0.2–1)
BUN SERPL-MCNC: 16 MG/DL (ref 6–20)
BUN/CREAT SERPL: 16 (ref 12–20)
CALCIUM SERPL-MCNC: 9.6 MG/DL (ref 8.5–10.1)
CHLORIDE SERPL-SCNC: 101 MMOL/L (ref 97–108)
CO2 SERPL-SCNC: 30 MMOL/L (ref 21–32)
COMMENT, HOLDF: NORMAL
CREAT SERPL-MCNC: 1 MG/DL (ref 0.55–1.02)
DIFFERENTIAL METHOD BLD: ABNORMAL
EOSINOPHIL # BLD: 0.3 K/UL (ref 0–0.4)
EOSINOPHIL NFR BLD: 4 % (ref 0–7)
ERYTHROCYTE [DISTWIDTH] IN BLOOD BY AUTOMATED COUNT: 14.7 % (ref 11.5–14.5)
GLOBULIN SER CALC-MCNC: 4.1 G/DL (ref 2–4)
GLUCOSE SERPL-MCNC: 182 MG/DL (ref 65–100)
HCT VFR BLD AUTO: 44.5 % (ref 35–47)
HGB BLD-MCNC: 14.6 G/DL (ref 11.5–16)
IMM GRANULOCYTES # BLD AUTO: 0.1 K/UL (ref 0–0.04)
IMM GRANULOCYTES NFR BLD AUTO: 1 % (ref 0–0.5)
INR PPP: 1 (ref 0.9–1.1)
LIPASE SERPL-CCNC: 154 U/L (ref 73–393)
LYMPHOCYTES # BLD: 1.2 K/UL (ref 0.8–3.5)
LYMPHOCYTES NFR BLD: 18 % (ref 12–49)
MCH RBC QN AUTO: 29 PG (ref 26–34)
MCHC RBC AUTO-ENTMCNC: 32.8 G/DL (ref 30–36.5)
MCV RBC AUTO: 88.3 FL (ref 80–99)
MONOCYTES # BLD: 0.3 K/UL (ref 0–1)
MONOCYTES NFR BLD: 4 % (ref 5–13)
NEUTS SEG # BLD: 4.8 K/UL (ref 1.8–8)
NEUTS SEG NFR BLD: 72 % (ref 32–75)
NRBC # BLD: 0 K/UL (ref 0–0.01)
NRBC BLD-RTO: 0 PER 100 WBC
PLATELET # BLD AUTO: 343 K/UL (ref 150–400)
PMV BLD AUTO: 8.5 FL (ref 8.9–12.9)
POTASSIUM SERPL-SCNC: 4.3 MMOL/L (ref 3.5–5.1)
PROT SERPL-MCNC: 7.9 G/DL (ref 6.4–8.2)
PROTHROMBIN TIME: 10.3 SEC (ref 9–11.1)
RBC # BLD AUTO: 5.04 M/UL (ref 3.8–5.2)
SAMPLES BEING HELD,HOLD: NORMAL
SODIUM SERPL-SCNC: 135 MMOL/L (ref 136–145)
TROPONIN I SERPL HS-MCNC: 4 NG/L (ref 0–51)
WBC # BLD AUTO: 6.6 K/UL (ref 3.6–11)

## 2023-03-01 PROCEDURE — 74011000636 HC RX REV CODE- 636: Performed by: RADIOLOGY

## 2023-03-01 PROCEDURE — 84484 ASSAY OF TROPONIN QUANT: CPT

## 2023-03-01 PROCEDURE — 36415 COLL VENOUS BLD VENIPUNCTURE: CPT

## 2023-03-01 PROCEDURE — 83690 ASSAY OF LIPASE: CPT

## 2023-03-01 PROCEDURE — 96375 TX/PRO/DX INJ NEW DRUG ADDON: CPT

## 2023-03-01 PROCEDURE — 74011250636 HC RX REV CODE- 250/636: Performed by: EMERGENCY MEDICINE

## 2023-03-01 PROCEDURE — 96374 THER/PROPH/DIAG INJ IV PUSH: CPT

## 2023-03-01 PROCEDURE — 74177 CT ABD & PELVIS W/CONTRAST: CPT

## 2023-03-01 PROCEDURE — 80053 COMPREHEN METABOLIC PANEL: CPT

## 2023-03-01 PROCEDURE — 99285 EMERGENCY DEPT VISIT HI MDM: CPT

## 2023-03-01 PROCEDURE — 85610 PROTHROMBIN TIME: CPT

## 2023-03-01 PROCEDURE — 85025 COMPLETE CBC W/AUTO DIFF WBC: CPT

## 2023-03-01 RX ORDER — ACETAMINOPHEN AND CODEINE PHOSPHATE 300; 30 MG/1; MG/1
1 TABLET ORAL
Qty: 12 TABLET | Refills: 0 | Status: SHIPPED | OUTPATIENT
Start: 2023-03-01 | End: 2023-03-04

## 2023-03-01 RX ORDER — MORPHINE SULFATE 4 MG/ML
4 INJECTION INTRAVENOUS ONCE
Status: COMPLETED | OUTPATIENT
Start: 2023-03-01 | End: 2023-03-01

## 2023-03-01 RX ORDER — ONDANSETRON 2 MG/ML
4 INJECTION INTRAMUSCULAR; INTRAVENOUS ONCE
Status: COMPLETED | OUTPATIENT
Start: 2023-03-01 | End: 2023-03-01

## 2023-03-01 RX ORDER — ONDANSETRON 4 MG/1
4 TABLET, FILM COATED ORAL
Qty: 20 TABLET | Refills: 0 | Status: SHIPPED | OUTPATIENT
Start: 2023-03-01

## 2023-03-01 RX ADMIN — ONDANSETRON 4 MG: 2 INJECTION INTRAMUSCULAR; INTRAVENOUS at 04:35

## 2023-03-01 RX ADMIN — MORPHINE SULFATE 4 MG: 4 INJECTION, SOLUTION INTRAMUSCULAR; INTRAVENOUS at 04:35

## 2023-03-01 RX ADMIN — SODIUM CHLORIDE 1000 ML: 9 INJECTION, SOLUTION INTRAVENOUS at 04:38

## 2023-03-01 RX ADMIN — IOPAMIDOL 100 ML: 755 INJECTION, SOLUTION INTRAVENOUS at 05:35

## 2023-03-01 NOTE — DISCHARGE INSTRUCTIONS
You were seen in the emergency department for abdominal pain. The results of your tests were consistent with gallstones. Please take any medications prescribed at this visit as instructed. Please follow-up with a general surgeon and your PCP or return to the emergency department if you experience a worsening of symptoms or any new symptoms that are concerning to you.

## 2023-03-01 NOTE — ED NOTES
Bedside and Verbal shift change report given to Otto RN (oncoming nurse) by Do Flores RN (offgoing nurse). Report included the following information SBAR, ED Summary, and MAR.

## 2023-03-01 NOTE — ED PROVIDER NOTES
32-year-old female with PMHx of hypertension, fibroid uterus, cholelithiasis presents the emergency department complaining of severe right upper quadrant pain for the past 2 days. Patient notes that she has been having this pain intermittently for couple of months, but never this severe. She also reports nausea and vomiting since earlier today. She has no additional complaints at this time    The history is provided by the patient. Abdominal Pain   Associated symptoms include nausea and vomiting. Vomiting   Associated symptoms include abdominal pain. Past Medical History:   Diagnosis Date    COVID-19 01/01/2021    Fibroid tumor     Hypertension     Obesity, Class II, BMI 35-39.9        Past Surgical History:   Procedure Laterality Date    HX GYN      Removal of fibroid tumors. HX KNEE ARTHROSCOPY Right 09/2015    Meniscus repair.     HX ABBE AND BSO      HX TUBAL LIGATION  1990         Family History:   Problem Relation Age of Onset    Cancer Mother     Dementia Mother     No Known Problems Father        Social History     Socioeconomic History    Marital status:      Spouse name: Not on file    Number of children: Not on file    Years of education: Not on file    Highest education level: Not on file   Occupational History    Not on file   Tobacco Use    Smoking status: Never    Smokeless tobacco: Never   Vaping Use    Vaping Use: Never used   Substance and Sexual Activity    Alcohol use: No    Drug use: No    Sexual activity: Yes   Other Topics Concern    Not on file   Social History Narrative    ** Merged History Encounter **          Social Determinants of Health     Financial Resource Strain: Not on file   Food Insecurity: Not on file   Transportation Needs: Not on file   Physical Activity: Not on file   Stress: Not on file   Social Connections: Not on file   Intimate Partner Violence: Not on file   Housing Stability: Not on file         ALLERGIES: Pcn [penicillins], Pcn [penicillins], Percocet [oxycodone-acetaminophen], Sulfa (sulfonamide antibiotics), and Sulfa (sulfonamide antibiotics)    Review of Systems   Constitutional: Negative. HENT: Negative. Eyes: Negative. Respiratory: Negative. Cardiovascular: Negative. Gastrointestinal:  Positive for abdominal pain, nausea and vomiting. Endocrine: Negative. Genitourinary: Negative. Musculoskeletal: Negative. Skin: Negative. Neurological: Negative. Hematological: Negative. Psychiatric/Behavioral: Negative. Vitals:    03/01/23 0408   BP: (!) 152/98   Pulse: 76   Resp: 16   Temp: 97.5 °F (36.4 °C)   SpO2: 95%   Weight: 83.9 kg (185 lb)   Height: 5' 1\" (1.549 m)            Physical Exam  Vitals and nursing note reviewed. Constitutional:       General: She is not in acute distress. Appearance: Normal appearance. She is not ill-appearing. Comments: Uncomfortable appearing   HENT:      Head: Normocephalic and atraumatic. Nose: Nose normal.      Mouth/Throat:      Mouth: Mucous membranes are moist.   Eyes:      Extraocular Movements: Extraocular movements intact. Pupils: Pupils are equal, round, and reactive to light. Cardiovascular:      Rate and Rhythm: Normal rate and regular rhythm. Pulses: Normal pulses. Pulmonary:      Effort: Pulmonary effort is normal. No respiratory distress. Breath sounds: Normal breath sounds. Abdominal:      General: There is no distension. Palpations: Abdomen is soft. Tenderness: There is abdominal tenderness in the right upper quadrant. Musculoskeletal:         General: Normal range of motion. Cervical back: Normal range of motion and neck supple. Skin:     General: Skin is warm and dry. Neurological:      General: No focal deficit present. Mental Status: She is alert and oriented to person, place, and time.    Psychiatric:         Mood and Affect: Mood normal.        Medical Decision Making  DDx: Food poisoning, viral gastroenteritis, appendicitis, cholecystitis, choledocholithiasis, biliary colic, diverticulitis, colitis, gastritis, pancreatitis, SBO, ileus, UTI, kidney stone    Plan:  - Labs: CBC, CMP, lipase, UA  - Imaging: CT abdomen pelvis  - Medications: Morphine, ondansetron    Reassessment: CT abdomen pelvis demonstrates multiple gallstones, but no evidence of acute cholecystitis. Patient's blood work is reassuring with normal LFTs and normal WBC count. She reports resolution of her symptoms following the above interventions. Counseled the patient regarding biliary colic and the likely need for an elective cholecystectomy. She has a general surgeon to follow-up with, though I will provide her with referral information in case she has trouble getting in with them. She may safely be discharged at this time with recommendation to follow-up with PCP and general surgery or return to the emergency department if her symptoms worsen, if she develops fever, intractable vomiting or pain, or any other symptoms that are concerning to her. Amount and/or Complexity of Data Reviewed  Labs: ordered. Radiology: ordered. Risk  Prescription drug management. Parenteral controlled substances.            Procedures

## 2023-03-01 NOTE — ED TRIAGE NOTES
Pt presents with complaints of possible \"gallbladder rupture\". Pt states they have sharp pain from their esophagus down to their umbilicus. States the pain radiates to their back. Has has this pain for the last two days. Pain rated as a 10 out of 10. Has a history of gall stones. No medications taken prior to arrival. Feels like they are going to vomit in the triage room due to the pian.  All vitals stable

## 2023-04-21 ENCOUNTER — TELEPHONE (OUTPATIENT)
Dept: FAMILY MEDICINE CLINIC | Age: 58
End: 2023-04-21

## 2023-04-21 DIAGNOSIS — I10 ESSENTIAL HYPERTENSION WITH GOAL BLOOD PRESSURE LESS THAN 130/80: ICD-10-CM

## 2023-04-21 RX ORDER — HYDROCHLOROTHIAZIDE 25 MG/1
25 TABLET ORAL DAILY
Qty: 30 TABLET | Refills: 0 | Status: SHIPPED | OUTPATIENT
Start: 2023-04-21

## 2023-04-21 NOTE — TELEPHONE ENCOUNTER
Pt has an apt with you on 5.5.23, could you please send in a short term supply of hydrochlorothiazide 25 mg. Thanks.

## 2023-05-05 ENCOUNTER — OFFICE VISIT (OUTPATIENT)
Dept: FAMILY MEDICINE CLINIC | Age: 58
End: 2023-05-05

## 2023-05-05 VITALS
HEIGHT: 61 IN | DIASTOLIC BLOOD PRESSURE: 84 MMHG | HEART RATE: 98 BPM | OXYGEN SATURATION: 97 % | BODY MASS INDEX: 35.68 KG/M2 | WEIGHT: 189 LBS | RESPIRATION RATE: 16 BRPM | TEMPERATURE: 97.8 F | SYSTOLIC BLOOD PRESSURE: 120 MMHG

## 2023-05-05 DIAGNOSIS — I10 ESSENTIAL HYPERTENSION WITH GOAL BLOOD PRESSURE LESS THAN 130/80: Primary | ICD-10-CM

## 2023-05-05 DIAGNOSIS — M54.42 CHRONIC LEFT-SIDED LOW BACK PAIN WITH LEFT-SIDED SCIATICA: ICD-10-CM

## 2023-05-05 DIAGNOSIS — E78.2 MIXED HYPERLIPIDEMIA: ICD-10-CM

## 2023-05-05 DIAGNOSIS — G89.29 CHRONIC LEFT-SIDED LOW BACK PAIN WITH LEFT-SIDED SCIATICA: ICD-10-CM

## 2023-05-05 DIAGNOSIS — R73.03 PREDIABETES: ICD-10-CM

## 2023-05-05 LAB
ALBUMIN SERPL-MCNC: 3.8 G/DL (ref 3.5–5)
ALBUMIN/GLOB SERPL: 1 (ref 1.1–2.2)
ALP SERPL-CCNC: 73 U/L (ref 45–117)
ALT SERPL-CCNC: 22 U/L (ref 12–78)
ANION GAP SERPL CALC-SCNC: 7 MMOL/L (ref 5–15)
AST SERPL-CCNC: 17 U/L (ref 15–37)
BILIRUB SERPL-MCNC: 0.4 MG/DL (ref 0.2–1)
BUN SERPL-MCNC: 15 MG/DL (ref 6–20)
BUN/CREAT SERPL: 17 (ref 12–20)
CALCIUM SERPL-MCNC: 9.6 MG/DL (ref 8.5–10.1)
CHLORIDE SERPL-SCNC: 101 MMOL/L (ref 97–108)
CHOLEST SERPL-MCNC: 240 MG/DL
CO2 SERPL-SCNC: 28 MMOL/L (ref 21–32)
CREAT SERPL-MCNC: 0.87 MG/DL (ref 0.55–1.02)
ERYTHROCYTE [DISTWIDTH] IN BLOOD BY AUTOMATED COUNT: 14.5 % (ref 11.5–14.5)
EST. AVERAGE GLUCOSE BLD GHB EST-MCNC: 126 MG/DL
GLOBULIN SER CALC-MCNC: 3.7 G/DL (ref 2–4)
GLUCOSE SERPL-MCNC: 114 MG/DL (ref 65–100)
HBA1C MFR BLD: 6 % (ref 4–5.6)
HCT VFR BLD AUTO: 44.5 % (ref 35–47)
HDLC SERPL-MCNC: 60 MG/DL
HDLC SERPL: 4 (ref 0–5)
HGB BLD-MCNC: 14.2 G/DL (ref 11.5–16)
LDLC SERPL CALC-MCNC: 148.4 MG/DL (ref 0–100)
MCH RBC QN AUTO: 28.6 PG (ref 26–34)
MCHC RBC AUTO-ENTMCNC: 31.9 G/DL (ref 30–36.5)
MCV RBC AUTO: 89.7 FL (ref 80–99)
NRBC # BLD: 0 K/UL (ref 0–0.01)
NRBC BLD-RTO: 0 PER 100 WBC
PLATELET # BLD AUTO: 300 K/UL (ref 150–400)
PMV BLD AUTO: 9.2 FL (ref 8.9–12.9)
POTASSIUM SERPL-SCNC: 3.4 MMOL/L (ref 3.5–5.1)
PROT SERPL-MCNC: 7.5 G/DL (ref 6.4–8.2)
RBC # BLD AUTO: 4.96 M/UL (ref 3.8–5.2)
SODIUM SERPL-SCNC: 136 MMOL/L (ref 136–145)
TRIGL SERPL-MCNC: 158 MG/DL (ref ?–150)
TSH SERPL DL<=0.05 MIU/L-ACNC: 6.39 UIU/ML (ref 0.36–3.74)
VLDLC SERPL CALC-MCNC: 31.6 MG/DL
WBC # BLD AUTO: 4.1 K/UL (ref 3.6–11)

## 2023-05-05 RX ORDER — HYDROCHLOROTHIAZIDE 25 MG/1
25 TABLET ORAL DAILY
Qty: 90 TABLET | Refills: 1 | Status: SHIPPED | OUTPATIENT
Start: 2023-05-05

## 2023-05-05 RX ORDER — IBUPROFEN 800 MG/1
800 TABLET ORAL
Qty: 45 TABLET | Refills: 1 | Status: SHIPPED | OUTPATIENT
Start: 2023-05-05

## 2023-05-05 RX ORDER — ADHESIVE BANDAGE
30 BANDAGE TOPICAL DAILY PRN
COMMUNITY

## 2023-05-05 RX ORDER — ROSUVASTATIN CALCIUM 5 MG/1
5 TABLET, COATED ORAL
Qty: 90 TABLET | Refills: 1 | Status: SHIPPED | OUTPATIENT
Start: 2023-05-05

## 2023-05-20 RX ORDER — HYDROCHLOROTHIAZIDE 25 MG/1
25 TABLET ORAL DAILY
COMMUNITY
Start: 2023-05-05

## 2023-05-20 RX ORDER — BENZONATATE 200 MG/1
200 CAPSULE ORAL 3 TIMES DAILY PRN
COMMUNITY
Start: 2023-02-21

## 2023-05-20 RX ORDER — ALBUTEROL SULFATE 90 UG/1
2 AEROSOL, METERED RESPIRATORY (INHALATION) EVERY 4 HOURS PRN
COMMUNITY
Start: 2023-02-27 | End: 2024-02-22

## 2023-05-20 RX ORDER — ONDANSETRON 4 MG/1
4 TABLET, FILM COATED ORAL EVERY 8 HOURS PRN
COMMUNITY
Start: 2023-03-01

## 2023-05-20 RX ORDER — ASCORBIC ACID 250 MG
250 TABLET ORAL DAILY
COMMUNITY

## 2023-05-20 RX ORDER — CYCLOBENZAPRINE HCL 5 MG
5 TABLET ORAL
COMMUNITY
Start: 2021-06-25

## 2023-05-20 RX ORDER — ROSUVASTATIN CALCIUM 5 MG/1
1 TABLET, COATED ORAL NIGHTLY
COMMUNITY
Start: 2023-05-05

## 2023-05-20 RX ORDER — IBUPROFEN 800 MG/1
800 TABLET ORAL EVERY 8 HOURS PRN
COMMUNITY
Start: 2023-05-05

## 2023-09-26 NOTE — PROGRESS NOTES
1. Have you been to the ER, urgent care clinic since your last visit? Hospitalized since your last visit? No    2. Have you seen or consulted any other health care providers outside of the [address] [place] [address] since your last visit? Include any pap smears or colon screening.  No     Chief Complaint   Patient presents with    Sore Throat     x5 days    Headache    Vomiting     Body Weight: 174.2  Body Fat%: 39.7  Muscle Mass Weight: 32.9  Body Water Weight: 75.8  Basal Metabolic Rate: 0544  BMI: 31.86 Zoryve Counseling:  I discussed with the patient that Zoryve is not for use in the eyes, mouth or vagina. The most commonly reported side effects include diarrhea, headache, insomnia, application site pain, upper respiratory tract infections, and urinary tract infections.  All of the patient's questions and concerns were addressed.

## 2023-11-03 RX ORDER — ROSUVASTATIN CALCIUM 5 MG/1
5 TABLET, COATED ORAL
Qty: 90 TABLET | Refills: 0 | Status: SHIPPED | OUTPATIENT
Start: 2023-11-03

## 2023-11-03 RX ORDER — HYDROCHLOROTHIAZIDE 25 MG/1
25 TABLET ORAL DAILY
Qty: 90 TABLET | Refills: 0 | Status: SHIPPED | OUTPATIENT
Start: 2023-11-03

## 2023-12-06 RX ORDER — IBUPROFEN 800 MG/1
800 TABLET ORAL EVERY 8 HOURS PRN
Qty: 45 TABLET | Refills: 0 | Status: SHIPPED | OUTPATIENT
Start: 2023-12-06

## 2024-03-06 RX ORDER — ROSUVASTATIN CALCIUM 5 MG/1
5 TABLET, COATED ORAL
Qty: 90 TABLET | Refills: 0 | Status: SHIPPED | OUTPATIENT
Start: 2024-03-06

## 2024-03-06 RX ORDER — HYDROCHLOROTHIAZIDE 25 MG/1
25 TABLET ORAL DAILY
Qty: 90 TABLET | Refills: 0 | Status: SHIPPED | OUTPATIENT
Start: 2024-03-06

## 2025-08-27 ENCOUNTER — TELEPHONE (OUTPATIENT)
Facility: CLINIC | Age: 60
End: 2025-08-27